# Patient Record
Sex: MALE | Race: WHITE | NOT HISPANIC OR LATINO | Employment: FULL TIME | URBAN - METROPOLITAN AREA
[De-identification: names, ages, dates, MRNs, and addresses within clinical notes are randomized per-mention and may not be internally consistent; named-entity substitution may affect disease eponyms.]

---

## 2017-01-17 ENCOUNTER — OFFICE VISIT (OUTPATIENT)
Dept: ALLERGY | Facility: CLINIC | Age: 42
End: 2017-01-17
Payer: COMMERCIAL

## 2017-01-17 VITALS
BODY MASS INDEX: 30.66 KG/M2 | DIASTOLIC BLOOD PRESSURE: 70 MMHG | HEIGHT: 68 IN | WEIGHT: 202.31 LBS | OXYGEN SATURATION: 97 % | HEART RATE: 65 BPM | SYSTOLIC BLOOD PRESSURE: 122 MMHG

## 2017-01-17 DIAGNOSIS — J30.89 ALLERGIC RHINITIS DUE TO DUST: Primary | ICD-10-CM

## 2017-01-17 PROCEDURE — 99203 OFFICE O/P NEW LOW 30 MIN: CPT | Mod: S$GLB,,, | Performed by: ALLERGY & IMMUNOLOGY

## 2017-01-17 PROCEDURE — 99999 PR PBB SHADOW E&M-EST. PATIENT-LVL III: CPT | Mod: PBBFAC,,, | Performed by: ALLERGY & IMMUNOLOGY

## 2017-01-17 PROCEDURE — 1159F MED LIST DOCD IN RCRD: CPT | Mod: S$GLB,,, | Performed by: ALLERGY & IMMUNOLOGY

## 2017-01-17 NOTE — PROGRESS NOTES
Subjective:       Patient ID: Gautam Ross is a 41 y.o. male.    Chief Complaint:  Allergies (red,itchy watery eyes, sneezing worse in winter)      HPI:  Patient's symptoms include clear rhinorrhea, cough, itchy eyes, itchy nose, nasal congestion, postnasal drip, sinus pressure, sneezing, swelling of eyes and rash around neck. These symptoms are seasonal, episodic. Worse in winter. Sx's mild, absent in summer. Getting out winter sweaters, dust exposure, aggravate sx's. Current triggers include exposure to dust and cats. Takes claritin prior to visiting dad, who has cats. Ok w his dogs. The patient has been suffering from these symptoms for approximately 20 years. Worse in recent years, toribio this year. The patient has tried claritin prn with good relief of symptoms. Takes it about once per month.Takes afrin about once per year x 3-4 days for flares in nasal congestion.  Immunotherapy has never been tried. The patient has never had nasal polyps. The patient has no history of asthma. The patient has no history of eczema. The patient does not suffer from frequent sinopulmonary infections. The patient has not had sinus surgery in the past.     PMHx:  O/w healthy    Family History   Problem Relation Age of Onset    Arthritis Mother     Cancer Father     Diabetes Father     Allergies Son      food allergies   no know parental asthma      Environmental History: Pets in the home: dogs (2).  Martha: hardwood floors  Tobacco Smoke in Home: no     Review of Systems   Constitutional: Negative for activity change, appetite change, fatigue and fever.   HENT: Positive for congestion, postnasal drip, rhinorrhea and sneezing. Negative for ear discharge, facial swelling and sinus pressure.    Eyes: Positive for itching. Negative for redness.   Respiratory: Negative for cough, chest tightness, shortness of breath and wheezing.    Cardiovascular: Negative for chest pain.   Gastrointestinal: Negative for abdominal pain,  constipation, diarrhea, nausea and vomiting.   Genitourinary: Negative for difficulty urinating.   Musculoskeletal: Negative for arthralgias, joint swelling and myalgias.   Skin: Negative for rash.   Neurological: Negative for weakness and headaches.   Hematological: Negative for adenopathy. Does not bruise/bleed easily.   Psychiatric/Behavioral: Negative for behavioral problems and sleep disturbance. The patient is not nervous/anxious.         Objective:    Physical Exam   Constitutional: He is oriented to person, place, and time. He appears well-developed and well-nourished. No distress.   HENT:   Head: Normocephalic and atraumatic.   Right Ear: External ear normal.   Left Ear: External ear normal.   Nose: Nose normal.   Mouth/Throat: Oropharynx is clear and moist. No oropharyngeal exudate.   Eyes: Conjunctivae are normal. Right eye exhibits no discharge. Left eye exhibits no discharge. No scleral icterus.   Neck: Neck supple. No thyromegaly present.   Cardiovascular: Normal rate and regular rhythm.    Pulmonary/Chest: Effort normal and breath sounds normal. No respiratory distress. He has no wheezes.   Abdominal: Soft. He exhibits no distension. There is no tenderness. There is no rebound and no guarding.   Musculoskeletal: Normal range of motion. He exhibits no edema.   Lymphadenopathy:     He has no cervical adenopathy.   Neurological: He is alert and oriented to person, place, and time.   Skin: Skin is warm. No rash noted. He is not diaphoretic. No erythema.   Psychiatric: He has a normal mood and affect. His behavior is normal. Thought content normal.       Laboratory:   none performed   Assessment:       1. Allergic rhinitis due to dust         Plan:       1. claritin prn. Ok routine use if needed.  2. Trial flonase if worsening of sx's  3. Discussed poss allergy testing. Defer for now. Pt may be interested in future dust mite sublingual immunotherapy, in which case we'll reconsider testing. He will check w  / us in a few months if he is interested to check availability  FU prn

## 2017-01-17 NOTE — LETTER
January 17, 2017      Weston Kwong Jr., MD  [address]  [facility] [address]           [facility]  [address] [facility] [address]  Phone: [phone]          Patient: Gautam Ross   MR Number: [id number]   YOB: 1975   Date of Visit: 1/17/2017       Dear Dr. Weston Kwong Jr.:    Thank you for referring Gautam Ross to me for evaluation. Attached you will find relevant portions of my assessment and plan of care.    If you have questions, please do not hesitate to call me. I look forward to following Gautam Ross along with you.    Sincerely,    Cooper Chavira MD    Enclosure  CC:  No Recipients    If you would like to receive this communication electronically, please contact [email or web address] or [phone] to request more information on [employer] Link access.    For providers and/or their staff who would like to refer a patient to [facility], please contact us through our one-stop-shop provider referral line, [facility], at [phone].    If you feel you have received this communication in error or would no longer like to receive these types of communications, please e-mail [email or web address]

## 2017-04-17 ENCOUNTER — OFFICE VISIT (OUTPATIENT)
Dept: INTERNAL MEDICINE | Facility: CLINIC | Age: 42
End: 2017-04-17
Payer: COMMERCIAL

## 2017-04-17 VITALS
HEART RATE: 80 BPM | SYSTOLIC BLOOD PRESSURE: 130 MMHG | DIASTOLIC BLOOD PRESSURE: 85 MMHG | BODY MASS INDEX: 30.94 KG/M2 | HEIGHT: 68 IN | WEIGHT: 204.13 LBS

## 2017-04-17 DIAGNOSIS — E78.5 HYPERLIPIDEMIA, UNSPECIFIED HYPERLIPIDEMIA TYPE: Primary | ICD-10-CM

## 2017-04-17 DIAGNOSIS — B02.9 HERPES ZOSTER WITHOUT COMPLICATION: ICD-10-CM

## 2017-04-17 PROCEDURE — 99999 PR PBB SHADOW E&M-EST. PATIENT-LVL III: CPT | Mod: PBBFAC,,, | Performed by: INTERNAL MEDICINE

## 2017-04-17 PROCEDURE — 1160F RVW MEDS BY RX/DR IN RCRD: CPT | Mod: S$GLB,,, | Performed by: INTERNAL MEDICINE

## 2017-04-17 PROCEDURE — 99213 OFFICE O/P EST LOW 20 MIN: CPT | Mod: S$GLB,,, | Performed by: INTERNAL MEDICINE

## 2017-04-17 RX ORDER — VALACYCLOVIR HYDROCHLORIDE 1 G/1
1000 TABLET, FILM COATED ORAL 3 TIMES DAILY
Qty: 21 TABLET | Refills: 0 | Status: SHIPPED | OUTPATIENT
Start: 2017-04-17 | End: 2018-06-07

## 2017-04-17 RX ORDER — FLUTICASONE PROPIONATE 50 MCG
1 SPRAY, SUSPENSION (ML) NASAL DAILY
COMMUNITY
End: 2023-07-31 | Stop reason: CLARIF

## 2017-04-17 NOTE — MR AVS SNAPSHOT
[facility]  [address]  [place] [address]  Phone: [phone]  Fax: [phone]                  Gautam Ross   2017 8:40 AM   Office Visit    Description:  Male : 1975   Provider:  Weston Kwong Jr., MD   Department:  [facility]           Reason for Visit     Rash           Diagnoses this Visit        Comments    Hyperlipidemia, unspecified hyperlipidemia type    -  Primary            To Do List           Goals (5 Years of Data)     None      Follow-Up and Disposition     Return in about 1 year (around 2018).       These Medications        Disp Refills Start End    valacyclovir (VALTREX) 1000 MG tablet 21 tablet 0 2017    Take 1 tablet (1,000 mg total) by mouth 3 (three) times daily. - Oral    Pharmacy: [facility]/pharmacy #[id number] - [place] - [address]  #: [phone]         [facility]     [facility] Nurse Care Line -  Assistance  Unless otherwise directed by your provider, please contact [facility], our nurse care line that is available for  assistance.     Registered nurses in the [facility] On Call Center provide: appointment scheduling, clinical advisement, health education, and other advisory services.  Call: [phone] (toll free)               Medications           Message regarding Medications     Verify the changes and/or additions to your medication regime listed below are the same as discussed with your clinician today.  If any of these changes or additions are incorrect, please notify your healthcare provider.        START taking these NEW medications        Refills    valacyclovir (VALTREX) 1000 MG tablet 0    Sig: Take 1 tablet (1,000 mg total) by mouth 3 (three) times daily.    Class: Normal    Route: Oral           Verify that the below list of medications is an accurate representation of the medications you are currently taking.  If none reported, the  "list may be blank. If incorrect, please contact your healthcare provider. Carry this list with you in case of emergency.           Current Medications     fluticasone (FLONASE) 50 mcg/actuation nasal spray 1 spray by Each Nare route once daily. Each nostril daily    valacyclovir (VALTREX) 1000 MG tablet Take 1 tablet (1,000 mg total) by mouth 3 (three) times daily.           Clinical Reference Information           Your Vitals Were     BP Pulse Height Weight BMI    130/85 80 5' 8" (1.727 m) 92.6 kg (204 lb 2.3 oz) 31.04 kg/m2      Blood Pressure          Most Recent Value    BP  130/85      Allergies as of 4/17/2017     No Known Allergies      Immunizations Administered on Date of Encounter - 4/17/2017     None      Orders Placed During Today's Visit     Future Labs/Procedures Expected by Expires    Comprehensive metabolic panel  4/17/2017 4/17/2018    Lipid panel  4/17/2017 4/17/2018      Language Assistance Services     ATTENTION: Language assistance services are available, free of charge. Please call [phone].      ATENCIÓN: Si habla toribioañol, tiene a williamson disposición servicios gratuitos de asistencia lingüística. Llame al [phone].     ROBERTO Ý: N?u b?n nói Ti?ng Vi?t, có các d?ch v? h? tr? ngôn ng? mi?n phí dành cho b?n. G?i s? [phone].         [facility] complies with applicable Federal civil rights laws and does not discriminate on the basis of race, color, national origin, age, disability, or sex.        "

## 2017-04-17 NOTE — PROGRESS NOTES
Subjective:       Patient ID: Gautam Ross is a 42 y.o. male.    Chief Complaint: Rash (x week, back and rib, right side, itchy, painful )    HPI the patient is a 42-year-old male comes in for evaluation of a rash in for checkup.  The patient reports that beginning pain and his right flank area about 4-5 days ago.  About 2 days after the onset of the pain, he began noticing a slight rash.  The pain is located in a strep from the mid lower right back going into the right groin area.  He has not had any fever.  Review of Systems   Constitutional: Negative for appetite change, fatigue and unexpected weight change.   HENT: Negative for congestion and sore throat.    Eyes: Negative for visual disturbance.   Respiratory: Negative for cough, chest tightness, shortness of breath and wheezing.    Cardiovascular: Negative for chest pain and palpitations.   Gastrointestinal: Negative for abdominal distention, abdominal pain, blood in stool, constipation, diarrhea and nausea.   Genitourinary: Negative for difficulty urinating, dysuria, frequency and urgency.   Musculoskeletal: Negative for arthralgias, myalgias and neck stiffness.   Skin: Positive for rash.   Neurological: Negative for dizziness, weakness and headaches.   Psychiatric/Behavioral: Negative for behavioral problems, hallucinations and self-injury.       Objective:      Physical Exam   Constitutional: He is oriented to person, place, and time. He appears well-developed. No distress.   HENT:   Head: Normocephalic.   Mouth/Throat: No oropharyngeal exudate.   Eyes: Conjunctivae and EOM are normal. Pupils are equal, round, and reactive to light. Right eye exhibits no discharge. Left eye exhibits no discharge. No scleral icterus.   Fundi benign bilaterally.   Neck: Normal range of motion. No JVD present. No tracheal deviation present. No thyromegaly present.   Cardiovascular: Normal rate, regular rhythm and normal heart sounds.  Exam reveals no gallop and no  friction rub.    No murmur heard.  Pulmonary/Chest: Effort normal and breath sounds normal. No respiratory distress. He has no wheezes. He has no rales. He exhibits no tenderness.   Abdominal: Soft. Bowel sounds are normal. He exhibits no distension and no mass. There is no tenderness. There is no rebound and no guarding.   Musculoskeletal: Normal range of motion. He exhibits no edema or tenderness.   Lymphadenopathy:     He has no cervical adenopathy.   Neurological: He is alert and oriented to person, place, and time. He has normal reflexes. He displays normal reflexes. No cranial nerve deficit. He exhibits normal muscle tone. Coordination normal.   Skin: Skin is warm and dry. Rash noted. He is not diaphoretic. No erythema. No pallor.   Several splotches of rash with early vesicles are present in the right T12 dermatome beginning in the back and also occurring in the right inguinal area.   Psychiatric: He has a normal mood and affect. His behavior is normal. Thought content normal.       Assessment:       1. Hyperlipidemia, unspecified hyperlipidemia type    2. Herpes zoster without complication        Plan:       1.  Valtrex 1 g by mouth 3 times a day ×1 week  2.  Fasting lipids, CMP  3.  Return to clinic in 1 year

## 2017-05-31 ENCOUNTER — OFFICE VISIT (OUTPATIENT)
Dept: OPTOMETRY | Facility: CLINIC | Age: 42
End: 2017-05-31
Payer: COMMERCIAL

## 2017-05-31 DIAGNOSIS — Z98.890 HX OF LASIK: ICD-10-CM

## 2017-05-31 DIAGNOSIS — H52.4 MYOPIA OF BOTH EYES WITH ASTIGMATISM AND PRESBYOPIA: Primary | ICD-10-CM

## 2017-05-31 DIAGNOSIS — H52.13 MYOPIA OF BOTH EYES WITH ASTIGMATISM AND PRESBYOPIA: Primary | ICD-10-CM

## 2017-05-31 DIAGNOSIS — H52.203 MYOPIA OF BOTH EYES WITH ASTIGMATISM AND PRESBYOPIA: Primary | ICD-10-CM

## 2017-05-31 DIAGNOSIS — Z46.0 FITTING AND ADJUSTMENT OF SPECTACLES AND CONTACT LENSES: Primary | ICD-10-CM

## 2017-05-31 PROCEDURE — 99999 PR PBB SHADOW E&M-EST. PATIENT-LVL II: CPT | Mod: PBBFAC,,, | Performed by: OPTOMETRIST

## 2017-05-31 PROCEDURE — 92310 CONTACT LENS FITTING OU: CPT | Mod: ,,, | Performed by: OPTOMETRIST

## 2017-05-31 PROCEDURE — 92004 COMPRE OPH EXAM NEW PT 1/>: CPT | Mod: S$GLB,,, | Performed by: OPTOMETRIST

## 2017-05-31 PROCEDURE — 92015 DETERMINE REFRACTIVE STATE: CPT | Mod: S$GLB,,, | Performed by: OPTOMETRIST

## 2017-05-31 NOTE — PROGRESS NOTES
HPI     MEME: 2 years ago  Pt states  va with CL is not great. States this is his first time wearing   CL corrected for astigmatism. States he feels as though he is always   squinting. Denies f/f  +Eye allergies     No gtts     S/p LASIK ou (15 years ago ?)    Last edited by Sydney Florez on 5/31/2017  8:11 AM. (History)            Assessment /Plan     For exam results, see Encounter Report.    Myopia of both eyes with astigmatism and presbyopia    Hx of LASIK      1. Residual myopia/astig/presby sp LASIK 15 years ago.  Pt wearing ONE DAY AV MOIST ASTIG, but needs inc cyl power OD>OS.  Discussed presby, monovision options, OR keeping in present CLs w reduced minus to help at near.  Pt wishes full distance correction, and will wear otc readers.  Also discussed scleral lens/RGP options for best vision, but pt wishes to stay in SCLs    PLAN:    1. Sent pt to optical to order TRIAL CLs in [place] he needs.  I do NOT need to see at disp  2. Cont DW sched--exchange nightly  3. If no problems will call for CLRx, rtc 1 yr

## 2018-05-13 ENCOUNTER — ANESTHESIA EVENT (OUTPATIENT)
Dept: SURGERY | Facility: HOSPITAL | Age: 43
End: 2018-05-13
Payer: COMMERCIAL

## 2018-05-13 ENCOUNTER — SURGERY (OUTPATIENT)
Age: 43
End: 2018-05-13

## 2018-05-13 ENCOUNTER — HOSPITAL ENCOUNTER (OUTPATIENT)
Facility: HOSPITAL | Age: 43
Discharge: HOME | End: 2018-05-13
Attending: EMERGENCY MEDICINE | Admitting: SURGERY
Payer: COMMERCIAL

## 2018-05-13 ENCOUNTER — ANESTHESIA (OUTPATIENT)
Dept: SURGERY | Facility: HOSPITAL | Age: 43
End: 2018-05-13
Payer: COMMERCIAL

## 2018-05-13 VITALS
SYSTOLIC BLOOD PRESSURE: 114 MMHG | WEIGHT: 187 LBS | RESPIRATION RATE: 16 BRPM | BODY MASS INDEX: 28.34 KG/M2 | HEIGHT: 68 IN | DIASTOLIC BLOOD PRESSURE: 66 MMHG | TEMPERATURE: 98 F | OXYGEN SATURATION: 93 % | HEART RATE: 55 BPM

## 2018-05-13 DIAGNOSIS — K35.80 ACUTE APPENDICITIS, UNSPECIFIED ACUTE APPENDICITIS TYPE: Primary | ICD-10-CM

## 2018-05-13 LAB
ALBUMIN SERPL BCP-MCNC: 4.1 G/DL
ALP SERPL-CCNC: 109 U/L
ALT SERPL W/O P-5'-P-CCNC: 28 U/L
ANION GAP SERPL CALC-SCNC: 7 MMOL/L
AST SERPL-CCNC: 19 U/L
BASOPHILS # BLD AUTO: 0.04 K/UL
BASOPHILS NFR BLD: 0.5 %
BILIRUB SERPL-MCNC: 0.7 MG/DL
BILIRUB UR QL STRIP: NEGATIVE
BUN SERPL-MCNC: 18 MG/DL
BUN SERPL-MCNC: 19 MG/DL (ref 6–30)
CALCIUM SERPL-MCNC: 9.3 MG/DL
CHLORIDE SERPL-SCNC: 102 MMOL/L (ref 95–110)
CHLORIDE SERPL-SCNC: 104 MMOL/L
CLARITY UR REFRACT.AUTO: ABNORMAL
CO2 SERPL-SCNC: 27 MMOL/L
COLOR UR AUTO: YELLOW
CREAT SERPL-MCNC: 1 MG/DL
CREAT SERPL-MCNC: 1 MG/DL (ref 0.5–1.4)
DIFFERENTIAL METHOD: ABNORMAL
EOSINOPHIL # BLD AUTO: 0.2 K/UL
EOSINOPHIL NFR BLD: 2 %
ERYTHROCYTE [DISTWIDTH] IN BLOOD BY AUTOMATED COUNT: 13.3 %
EST. GFR  (AFRICAN AMERICAN): >60 ML/MIN/1.73 M^2
EST. GFR  (NON AFRICAN AMERICAN): >60 ML/MIN/1.73 M^2
GLUCOSE SERPL-MCNC: 120 MG/DL (ref 70–110)
GLUCOSE SERPL-MCNC: 121 MG/DL
GLUCOSE UR QL STRIP: NEGATIVE
HCT VFR BLD AUTO: 46.3 %
HCT VFR BLD CALC: 45 %PCV (ref 36–54)
HGB BLD-MCNC: 15 G/DL
HGB UR QL STRIP: NEGATIVE
IMM GRANULOCYTES # BLD AUTO: 0.03 K/UL
IMM GRANULOCYTES NFR BLD AUTO: 0.4 %
KETONES UR QL STRIP: NEGATIVE
LEUKOCYTE ESTERASE UR QL STRIP: NEGATIVE
LIPASE SERPL-CCNC: 25 U/L
LYMPHOCYTES # BLD AUTO: 1.2 K/UL
LYMPHOCYTES NFR BLD: 14 %
MCH RBC QN AUTO: 26.9 PG
MCHC RBC AUTO-ENTMCNC: 32.4 G/DL
MCV RBC AUTO: 83 FL
MONOCYTES # BLD AUTO: 0.5 K/UL
MONOCYTES NFR BLD: 5.7 %
NEUTROPHILS # BLD AUTO: 6.6 K/UL
NEUTROPHILS NFR BLD: 77.4 %
NITRITE UR QL STRIP: NEGATIVE
NRBC BLD-RTO: 0 /100 WBC
PH UR STRIP: 5 [PH] (ref 5–8)
PLATELET # BLD AUTO: 215 K/UL
PMV BLD AUTO: 11.1 FL
POC IONIZED CALCIUM: 1.15 MMOL/L (ref 1.06–1.42)
POC TCO2 (MEASURED): 29 MMOL/L (ref 23–29)
POTASSIUM BLD-SCNC: 4.3 MMOL/L (ref 3.5–5.1)
POTASSIUM SERPL-SCNC: 4.3 MMOL/L
PROT SERPL-MCNC: 7.9 G/DL
PROT UR QL STRIP: NEGATIVE
RBC # BLD AUTO: 5.58 M/UL
SAMPLE: ABNORMAL
SODIUM BLD-SCNC: 140 MMOL/L (ref 136–145)
SODIUM SERPL-SCNC: 138 MMOL/L
SP GR UR STRIP: 1.02 (ref 1–1.03)
URN SPEC COLLECT METH UR: ABNORMAL
UROBILINOGEN UR STRIP-ACNC: NEGATIVE EU/DL
WBC # BLD AUTO: 8.49 K/UL

## 2018-05-13 PROCEDURE — 25000003 PHARM REV CODE 250: Performed by: EMERGENCY MEDICINE

## 2018-05-13 PROCEDURE — 71000033 HC RECOVERY, INTIAL HOUR: Performed by: SURGERY

## 2018-05-13 PROCEDURE — G0378 HOSPITAL OBSERVATION PER HR: HCPCS

## 2018-05-13 PROCEDURE — 83690 ASSAY OF LIPASE: CPT

## 2018-05-13 PROCEDURE — 80053 COMPREHEN METABOLIC PANEL: CPT

## 2018-05-13 PROCEDURE — 36000709 HC OR TIME LEV III EA ADD 15 MIN: Performed by: SURGERY

## 2018-05-13 PROCEDURE — 96368 THER/DIAG CONCURRENT INF: CPT

## 2018-05-13 PROCEDURE — 96366 THER/PROPH/DIAG IV INF ADDON: CPT

## 2018-05-13 PROCEDURE — 63600175 PHARM REV CODE 636 W HCPCS: Performed by: NURSE ANESTHETIST, CERTIFIED REGISTERED

## 2018-05-13 PROCEDURE — S0030 INJECTION, METRONIDAZOLE: HCPCS | Performed by: EMERGENCY MEDICINE

## 2018-05-13 PROCEDURE — 99285 EMERGENCY DEPT VISIT HI MDM: CPT | Mod: ,,, | Performed by: EMERGENCY MEDICINE

## 2018-05-13 PROCEDURE — 88304 TISSUE EXAM BY PATHOLOGIST: CPT | Performed by: PATHOLOGY

## 2018-05-13 PROCEDURE — 37000008 HC ANESTHESIA 1ST 15 MINUTES: Performed by: SURGERY

## 2018-05-13 PROCEDURE — S0020 INJECTION, BUPIVICAINE HYDRO: HCPCS | Performed by: SURGERY

## 2018-05-13 PROCEDURE — 99283 EMERGENCY DEPT VISIT LOW MDM: CPT | Mod: 57,,, | Performed by: SURGERY

## 2018-05-13 PROCEDURE — 27201423 OPTIME MED/SURG SUP & DEVICES STERILE SUPPLY: Performed by: SURGERY

## 2018-05-13 PROCEDURE — 25000003 PHARM REV CODE 250: Performed by: SURGERY

## 2018-05-13 PROCEDURE — 44970 LAPAROSCOPY APPENDECTOMY: CPT | Mod: ,,, | Performed by: SURGERY

## 2018-05-13 PROCEDURE — 25500020 PHARM REV CODE 255: Performed by: EMERGENCY MEDICINE

## 2018-05-13 PROCEDURE — 96375 TX/PRO/DX INJ NEW DRUG ADDON: CPT

## 2018-05-13 PROCEDURE — 37000009 HC ANESTHESIA EA ADD 15 MINS: Performed by: SURGERY

## 2018-05-13 PROCEDURE — 36000708 HC OR TIME LEV III 1ST 15 MIN: Performed by: SURGERY

## 2018-05-13 PROCEDURE — 88304 TISSUE EXAM BY PATHOLOGIST: CPT | Mod: 26,,, | Performed by: PATHOLOGY

## 2018-05-13 PROCEDURE — 85025 COMPLETE CBC W/AUTO DIFF WBC: CPT

## 2018-05-13 PROCEDURE — 81003 URINALYSIS AUTO W/O SCOPE: CPT

## 2018-05-13 PROCEDURE — 96365 THER/PROPH/DIAG IV INF INIT: CPT

## 2018-05-13 PROCEDURE — D9220A PRA ANESTHESIA: Mod: ANES,,, | Performed by: ANESTHESIOLOGY

## 2018-05-13 PROCEDURE — 25000003 PHARM REV CODE 250: Performed by: NURSE ANESTHETIST, CERTIFIED REGISTERED

## 2018-05-13 PROCEDURE — 63600175 PHARM REV CODE 636 W HCPCS: Performed by: SURGERY

## 2018-05-13 PROCEDURE — D9220A PRA ANESTHESIA: Mod: CRNA,,, | Performed by: NURSE ANESTHETIST, CERTIFIED REGISTERED

## 2018-05-13 PROCEDURE — 99285 EMERGENCY DEPT VISIT HI MDM: CPT | Mod: 25

## 2018-05-13 PROCEDURE — 63600175 PHARM REV CODE 636 W HCPCS: Performed by: EMERGENCY MEDICINE

## 2018-05-13 RX ORDER — OXYCODONE HYDROCHLORIDE 5 MG/1
10 TABLET ORAL ONCE
Status: COMPLETED | OUTPATIENT
Start: 2018-05-13 | End: 2018-05-13

## 2018-05-13 RX ORDER — ONDANSETRON 2 MG/ML
INJECTION INTRAMUSCULAR; INTRAVENOUS
Status: DISCONTINUED | OUTPATIENT
Start: 2018-05-13 | End: 2018-05-13

## 2018-05-13 RX ORDER — OXYCODONE AND ACETAMINOPHEN 5; 325 MG/1; MG/1
1-2 TABLET ORAL
Refills: 0
Start: 2018-05-13 | End: 2018-05-13

## 2018-05-13 RX ORDER — FLUTICASONE PROPIONATE 50 MCG
1 SPRAY, SUSPENSION (ML) NASAL DAILY
Status: DISCONTINUED | OUTPATIENT
Start: 2018-05-14 | End: 2018-05-13 | Stop reason: HOSPADM

## 2018-05-13 RX ORDER — OXYCODONE AND ACETAMINOPHEN 5; 325 MG/1; MG/1
1 TABLET ORAL EVERY 4 HOURS PRN
Status: DISCONTINUED | OUTPATIENT
Start: 2018-05-13 | End: 2018-05-13 | Stop reason: HOSPADM

## 2018-05-13 RX ORDER — BUPIVACAINE HYDROCHLORIDE 5 MG/ML
INJECTION, SOLUTION EPIDURAL; INTRACAUDAL
Status: DISCONTINUED | OUTPATIENT
Start: 2018-05-13 | End: 2018-05-13 | Stop reason: HOSPADM

## 2018-05-13 RX ORDER — HYDROMORPHONE HYDROCHLORIDE 1 MG/ML
0.5 INJECTION, SOLUTION INTRAMUSCULAR; INTRAVENOUS; SUBCUTANEOUS
Status: DISCONTINUED | OUTPATIENT
Start: 2018-05-13 | End: 2018-05-13

## 2018-05-13 RX ORDER — VALACYCLOVIR HYDROCHLORIDE 500 MG/1
1000 TABLET, FILM COATED ORAL 3 TIMES DAILY
Status: DISCONTINUED | OUTPATIENT
Start: 2018-05-13 | End: 2018-05-13 | Stop reason: HOSPADM

## 2018-05-13 RX ORDER — OXYCODONE AND ACETAMINOPHEN 5; 325 MG/1; MG/1
1-2 TABLET ORAL
Qty: 45 TABLET | Refills: 0 | Status: SHIPPED | OUTPATIENT
Start: 2018-05-13 | End: 2018-06-07

## 2018-05-13 RX ORDER — MIDAZOLAM HYDROCHLORIDE 1 MG/ML
INJECTION, SOLUTION INTRAMUSCULAR; INTRAVENOUS
Status: DISCONTINUED | OUTPATIENT
Start: 2018-05-13 | End: 2018-05-13

## 2018-05-13 RX ORDER — ONDANSETRON 8 MG/1
8 TABLET, ORALLY DISINTEGRATING ORAL EVERY 6 HOURS PRN
Status: DISCONTINUED | OUTPATIENT
Start: 2018-05-13 | End: 2018-05-13 | Stop reason: HOSPADM

## 2018-05-13 RX ORDER — LIDOCAINE HCL/PF 100 MG/5ML
SYRINGE (ML) INTRAVENOUS
Status: DISCONTINUED | OUTPATIENT
Start: 2018-05-13 | End: 2018-05-13

## 2018-05-13 RX ORDER — ACETAMINOPHEN 10 MG/ML
INJECTION, SOLUTION INTRAVENOUS
Status: DISCONTINUED | OUTPATIENT
Start: 2018-05-13 | End: 2018-05-13

## 2018-05-13 RX ORDER — HYDROMORPHONE HYDROCHLORIDE 1 MG/ML
0.5 INJECTION, SOLUTION INTRAMUSCULAR; INTRAVENOUS; SUBCUTANEOUS
Status: DISCONTINUED | OUTPATIENT
Start: 2018-05-13 | End: 2018-05-13 | Stop reason: HOSPADM

## 2018-05-13 RX ORDER — METRONIDAZOLE 500 MG/100ML
500 INJECTION, SOLUTION INTRAVENOUS
Status: COMPLETED | OUTPATIENT
Start: 2018-05-13 | End: 2018-05-13

## 2018-05-13 RX ORDER — GLYCOPYRROLATE 0.2 MG/ML
INJECTION INTRAMUSCULAR; INTRAVENOUS
Status: DISCONTINUED | OUTPATIENT
Start: 2018-05-13 | End: 2018-05-13

## 2018-05-13 RX ORDER — ONDANSETRON 4 MG/1
4 TABLET, ORALLY DISINTEGRATING ORAL EVERY 6 HOURS PRN
Status: DISCONTINUED | OUTPATIENT
Start: 2018-05-13 | End: 2018-05-13

## 2018-05-13 RX ORDER — SODIUM CHLORIDE 0.9 % (FLUSH) 0.9 %
3 SYRINGE (ML) INJECTION EVERY 8 HOURS
Status: DISCONTINUED | OUTPATIENT
Start: 2018-05-13 | End: 2018-05-13 | Stop reason: HOSPADM

## 2018-05-13 RX ORDER — OXYCODONE AND ACETAMINOPHEN 5; 325 MG/1; MG/1
2 TABLET ORAL EVERY 4 HOURS PRN
Status: DISCONTINUED | OUTPATIENT
Start: 2018-05-13 | End: 2018-05-13 | Stop reason: HOSPADM

## 2018-05-13 RX ORDER — FENTANYL CITRATE 50 UG/ML
INJECTION, SOLUTION INTRAMUSCULAR; INTRAVENOUS
Status: DISCONTINUED | OUTPATIENT
Start: 2018-05-13 | End: 2018-05-13

## 2018-05-13 RX ORDER — SUCCINYLCHOLINE CHLORIDE 20 MG/ML
INJECTION INTRAMUSCULAR; INTRAVENOUS
Status: DISCONTINUED | OUTPATIENT
Start: 2018-05-13 | End: 2018-05-13

## 2018-05-13 RX ORDER — ROCURONIUM BROMIDE 10 MG/ML
INJECTION, SOLUTION INTRAVENOUS
Status: DISCONTINUED | OUTPATIENT
Start: 2018-05-13 | End: 2018-05-13

## 2018-05-13 RX ORDER — DEXTROSE MONOHYDRATE, SODIUM CHLORIDE, AND POTASSIUM CHLORIDE 50; 1.49; 9 G/1000ML; G/1000ML; G/1000ML
INJECTION, SOLUTION INTRAVENOUS CONTINUOUS
Status: DISCONTINUED | OUTPATIENT
Start: 2018-05-13 | End: 2018-05-13

## 2018-05-13 RX ORDER — PROCHLORPERAZINE EDISYLATE 5 MG/ML
10 INJECTION INTRAMUSCULAR; INTRAVENOUS EVERY 6 HOURS PRN
Status: DISCONTINUED | OUTPATIENT
Start: 2018-05-13 | End: 2018-05-13 | Stop reason: HOSPADM

## 2018-05-13 RX ORDER — NEOSTIGMINE METHYLSULFATE 1 MG/ML
INJECTION, SOLUTION INTRAVENOUS
Status: DISCONTINUED | OUTPATIENT
Start: 2018-05-13 | End: 2018-05-13

## 2018-05-13 RX ORDER — DEXAMETHASONE SODIUM PHOSPHATE 4 MG/ML
INJECTION, SOLUTION INTRA-ARTICULAR; INTRALESIONAL; INTRAMUSCULAR; INTRAVENOUS; SOFT TISSUE
Status: DISCONTINUED | OUTPATIENT
Start: 2018-05-13 | End: 2018-05-13

## 2018-05-13 RX ORDER — PROPOFOL 10 MG/ML
VIAL (ML) INTRAVENOUS
Status: DISCONTINUED | OUTPATIENT
Start: 2018-05-13 | End: 2018-05-13

## 2018-05-13 RX ADMIN — ACETAMINOPHEN 1000 MG: 10 INJECTION, SOLUTION INTRAVENOUS at 01:05

## 2018-05-13 RX ADMIN — SUCCINYLCHOLINE CHLORIDE 120 MG: 20 INJECTION, SOLUTION INTRAMUSCULAR; INTRAVENOUS at 01:05

## 2018-05-13 RX ADMIN — ONDANSETRON 4 MG: 4 TABLET, ORALLY DISINTEGRATING ORAL at 11:05

## 2018-05-13 RX ADMIN — GLYCOPYRROLATE 0.6 MG: 0.2 INJECTION, SOLUTION INTRAMUSCULAR; INTRAVENOUS at 01:05

## 2018-05-13 RX ADMIN — NEOSTIGMINE METHYLSULFATE 5 MG: 1 INJECTION INTRAVENOUS at 01:05

## 2018-05-13 RX ADMIN — CEFTRIAXONE SODIUM 2 G: 2 INJECTION, POWDER, FOR SOLUTION INTRAMUSCULAR; INTRAVENOUS at 10:05

## 2018-05-13 RX ADMIN — SODIUM CHLORIDE, SODIUM GLUCONATE, SODIUM ACETATE, POTASSIUM CHLORIDE, MAGNESIUM CHLORIDE, SODIUM PHOSPHATE, DIBASIC, AND POTASSIUM PHOSPHATE: .53; .5; .37; .037; .03; .012; .00082 INJECTION, SOLUTION INTRAVENOUS at 01:05

## 2018-05-13 RX ADMIN — OXYCODONE HYDROCHLORIDE 10 MG: 5 TABLET ORAL at 02:05

## 2018-05-13 RX ADMIN — METRONIDAZOLE 500 MG: 500 INJECTION, SOLUTION INTRAVENOUS at 10:05

## 2018-05-13 RX ADMIN — DEXTROSE MONOHYDRATE, SODIUM CHLORIDE, AND POTASSIUM CHLORIDE: 50; 9; 1.49 INJECTION, SOLUTION INTRAVENOUS at 12:05

## 2018-05-13 RX ADMIN — ROCURONIUM BROMIDE 10 MG: 10 INJECTION, SOLUTION INTRAVENOUS at 01:05

## 2018-05-13 RX ADMIN — FENTANYL CITRATE 100 MCG: 50 INJECTION, SOLUTION INTRAMUSCULAR; INTRAVENOUS at 01:05

## 2018-05-13 RX ADMIN — ONDANSETRON 4 MG: 2 INJECTION INTRAMUSCULAR; INTRAVENOUS at 01:05

## 2018-05-13 RX ADMIN — BUPIVACAINE HYDROCHLORIDE 10 ML: 5 INJECTION, SOLUTION EPIDURAL; INTRACAUDAL; PERINEURAL at 02:05

## 2018-05-13 RX ADMIN — LIDOCAINE HYDROCHLORIDE 100 MG: 20 INJECTION, SOLUTION INTRAVENOUS at 01:05

## 2018-05-13 RX ADMIN — ROCURONIUM BROMIDE 30 MG: 10 INJECTION, SOLUTION INTRAVENOUS at 01:05

## 2018-05-13 RX ADMIN — MIDAZOLAM HYDROCHLORIDE 2 MG: 1 INJECTION, SOLUTION INTRAMUSCULAR; INTRAVENOUS at 01:05

## 2018-05-13 RX ADMIN — IOHEXOL 75 ML: 350 INJECTION, SOLUTION INTRAVENOUS at 09:05

## 2018-05-13 RX ADMIN — PROPOFOL 150 MG: 10 INJECTION, EMULSION INTRAVENOUS at 01:05

## 2018-05-13 RX ADMIN — DEXAMETHASONE SODIUM PHOSPHATE 8 MG: 4 INJECTION, SOLUTION INTRAMUSCULAR; INTRAVENOUS at 01:05

## 2018-05-13 RX ADMIN — OXYCODONE HYDROCHLORIDE AND ACETAMINOPHEN 2 TABLET: 5; 325 TABLET ORAL at 07:05

## 2018-05-13 RX ADMIN — HYDROMORPHONE HYDROCHLORIDE 0.5 MG: 1 INJECTION, SOLUTION INTRAMUSCULAR; INTRAVENOUS; SUBCUTANEOUS at 12:05

## 2018-05-13 NOTE — ANESTHESIA POSTPROCEDURE EVALUATION
"Anesthesia Post Evaluation    Patient: Gautam Ross    Procedure(s) Performed: Procedure(s) (LRB):  APPENDECTOMY-LAPAROSCOPIC (N/A)    Final Anesthesia Type: general  Patient location during evaluation: PACU  Patient participation: Yes- Able to Participate  Level of consciousness: awake and alert  Post-procedure vital signs: reviewed and stable  Pain management: adequate  Airway patency: patent  PONV status at discharge: No PONV  Anesthetic complications: no      Cardiovascular status: blood pressure returned to baseline  Respiratory status: unassisted  Hydration status: euvolemic  Follow-up not needed.        Visit Vitals  /63 (BP Location: Left arm, Patient Position: Lying)   Pulse (!) 47   Temp 36.7 °C (98.1 °F) (Temporal)   Resp 18   Ht 5' 8" (1.727 m)   Wt 84.8 kg (187 lb)   SpO2 100%   BMI 28.43 kg/m²       Pain/Marily Score: Pain Rating Prior to Med Admin: 5 (5/13/2018 12:03 PM)      "

## 2018-05-13 NOTE — BRIEF OP NOTE
[facility]  Brief Operative Note     SUMMARY     Surgery Date: 5/13/2018     Surgeon(s) and Role:     * DEBORAH Sheffield Jr., MD - Resident - Assisting     * Arsalan Carmona MD - Primary    Pre-op Diagnosis:  Acute appendicitis, unspecified acute appendicitis type [K35.80]    Post-op Diagnosis:  Post-Op Diagnosis Codes:     * Acute appendicitis, unspecified acute appendicitis type [K35.80]    Procedure(s) (LRB):  APPENDECTOMY-LAPAROSCOPIC (N/A)    Anesthesia: General    Description of the findings of the procedure: Acute appendicitis, no evidence of perforation.  Removed laparoscopically.      Estimated Blood Loss: 10 cc.         Specimens:   Specimen (12h ago through future)    Start     Ordered    05/13/18 1356  Specimen to Pathology - Surgery  Once     Comments:  1. Appendix for permanent      05/13/18 1356          Discharge Note    SUMMARY     Admit Date: 5/13/2018    Discharge Date and Time:  05/13/2018 2:14 PM    Hospital Course (synopsis of major diagnoses, care, treatment, and services provided during the course of the hospital stay): Uneventful outpatient surgery.  Patient was discharged in good condition on evening of POD 0.    Final Diagnosis: Post-Op Diagnosis Codes:     * Acute appendicitis, unspecified acute appendicitis type [K35.80]    Disposition: Home or Self Care    Follow Up/Patient Instructions:     Medications:  Reconciled Home Medications:      Medication List      START taking these medications    oxyCODONE-acetaminophen 5-325 mg per tablet  Commonly known as:  PERCOCET  Take 1-2 tablets by mouth every 4 to 6 hours as needed.        CONTINUE taking these medications    fluticasone 50 mcg/actuation nasal spray  Commonly known as:  FLONASE  1 spray by Each Nare route once daily. Each nostril daily     valACYclovir 1000 MG tablet  Commonly known as:  VALTREX  Take 1 tablet (1,000 mg total) by mouth 3 (three) times daily.            Discharge Procedure Orders  Activity as  tolerated   Order Comments: Do not wet incision for 48 hours after surgery.  At that time, wash gently with warm soap and water at least once a day.  Do not scrub hard.  Do not soak incision in water for 2 weeks. Steri strips (small white pieces of surgical tape) will fall off on their own (10-14 days).  May place bandaids or gauze and tape over steri strips if there is some oozing.     Lifting restrictions   Order Comments: Do not lift anything greater than 10-15 lbs for 6 weeks     Notify your health care provider if you experience any of the following:  increased confusion or weakness     Notify your health care provider if you experience any of the following:  persistent dizziness, light-headedness, or visual disturbances     Notify your health care provider if you experience any of the following:  worsening rash     Notify your health care provider if you experience any of the following:  severe persistent headache     Notify your health care provider if you experience any of the following:  difficulty breathing or increased cough     Notify your health care provider if you experience any of the following:  redness, tenderness, or signs of infection (pain, swelling, redness, odor or green/yellow discharge around incision site)     Notify your health care provider if you experience any of the following:  severe uncontrolled pain     Notify your health care provider if you experience any of the following:  persistent nausea and vomiting or diarrhea     Notify your health care provider if you experience any of the following:  temperature >100.4     No dressing needed       Follow-up Information     Arsalan Carmona MD In 2 weeks.    Specialty:  General Surgery  Why:  For wound re-check  Contact information:  [address]  [facility] [address]  [phone]

## 2018-05-13 NOTE — TRANSFER OF CARE
"Anesthesia Transfer of Care Note    Patient: Gautam Ross    Procedure(s) Performed: Procedure(s) (LRB):  APPENDECTOMY-LAPAROSCOPIC (N/A)    Patient location: PACU    Anesthesia Type: general    Transport from OR: Transported from OR on 6-10 L/min O2 by face mask with adequate spontaneous ventilation    Post pain: adequate analgesia    Post assessment: no apparent anesthetic complications    Post vital signs: stable    Level of consciousness: awake, alert and oriented    Nausea/Vomiting: no nausea/vomiting    Complications: none    Transfer of care protocol was followed      Last vitals:   Visit Vitals  /69 (BP Location: Left arm, Patient Position: Lying)   Pulse (!) 53   Temp 36.7 °C (98.1 °F) (Temporal)   Resp 18   Ht 5' 8" (1.727 m)   Wt 84.8 kg (187 lb)   SpO2 99%   BMI 28.43 kg/m²     "

## 2018-05-13 NOTE — ED NOTES
General surgery and anesthesia consents obtained.  Gen surg resident has surgical consent.  Pt changes to gown with all clothing removed, shoes and wedding band bagged and given to wife.

## 2018-05-13 NOTE — ED NOTES
Verified with Dr. Sheffield that pt. Can receive potassium chloride 20 mEq infusion with a potassium level of 4.3 and without a cardiac monitor. Will continue to monitor the pt.

## 2018-05-13 NOTE — NURSING
Received report from Dain MOSES in recovery. Pt arrived to OBS unit via stretcher. Pt accompanied by RN. Pt aaox4. Pt with lap sites x3. Steri strips intact. C/o pain 6/10, bed at lowest level wheel locked. Call light within pt reach.

## 2018-05-13 NOTE — ED TRIAGE NOTES
"Right sided flank pain onset yesterday.  States he worked out Thursday and "my legs were totally sore".   Denies vomiting and diarrhea but reports a slight nausea.  Pain radiates mildly into lower abdomen.   Pain worse with movement, relieved somewhat when lying flat.    "

## 2018-05-13 NOTE — ED PROVIDER NOTES
"Encounter Date: 5/13/2018    SCRIBE #1 NOTE: I, Igor Blackwell, am scribing for, and in the presence of,  Dr. Oates. I have scribed the following portions of the note - Other sections scribed: MDM.       History     Chief Complaint   Patient presents with    Abdominal Pain     44 yo M with pmhx umbilical hernia repair presents with abdominal pain. Pt endorses pain to right flank with radiation to RLQ. Pain has been constant since yesterday morning. At rest it is "dull" in quality. It is worsened with movement and palpation. 4/10 severity currently. Associated nausea. No vomiting, diarrhea, constipation. No dysuria, hematuria. Pt reports he has been working out more lately, last strenuous workout was Thursday and the pain did not begin until Saturday. No fevers, chills, chest pain, or shortness of breath. He does endorse a few week history of a non-productive cough. No history of similar pain.      The history is provided by the patient and medical records.     Review of patient's allergies indicates:  No Known Allergies  History reviewed. No pertinent past medical history.  Past Surgical History:   Procedure Laterality Date    LASIK Bilateral     UMBILICAL HERNIA REPAIR  10/16/13    open repair without mesh for 1cm defect; Dr. Rosales, [facility]     Family History   Problem Relation Age of Onset    Arthritis Mother     Cancer Father     Diabetes Father     Cataracts Father     Allergies Son         food allergies    Amblyopia Neg Hx     Blindness Neg Hx     Glaucoma Neg Hx     Macular degeneration Neg Hx     Retinal detachment Neg Hx     Strabismus Neg Hx      Social History   Substance Use Topics    Smoking status: Former Smoker     Quit date: 8/24/2002    Smokeless tobacco: Not on file    Alcohol use No     Review of Systems   Constitutional: Negative for fever.   HENT: Negative for sore throat.    Respiratory: Positive for cough. Negative for shortness of breath.    Cardiovascular: Negative for chest " pain.   Gastrointestinal: Positive for abdominal pain and nausea. Negative for abdominal distention, constipation, diarrhea and vomiting.   Genitourinary: Negative for dysuria.   Musculoskeletal: Negative for back pain.   Skin: Negative for rash.   Neurological: Negative for weakness.   Hematological: Does not bruise/bleed easily.       Physical Exam     Initial Vitals [05/13/18 0653]   BP Pulse Resp Temp SpO2   (!) 141/75 80 18 98.3 °F (36.8 °C) 98 %      MAP       97         Physical Exam    Nursing note and vitals reviewed.  Constitutional: He appears well-developed and well-nourished. He is not diaphoretic. No distress.   HENT:   Head: Normocephalic and atraumatic.   Eyes: Conjunctivae and EOM are normal.   Neck: Normal range of motion. Neck supple. No JVD present.   Cardiovascular: Normal rate, regular rhythm, normal heart sounds and intact distal pulses. Exam reveals no gallop and no friction rub.    No murmur heard.  Pulmonary/Chest: Breath sounds normal. No respiratory distress. He has no wheezes. He has no rhonchi. He has no rales. He exhibits no tenderness.   Abdominal: Soft. Bowel sounds are normal. He exhibits no distension and no mass. There is no hepatosplenomegaly. There is tenderness in the right lower quadrant. There is no rebound, no guarding, no CVA tenderness and negative Vazquez's sign. No hernia.   Musculoskeletal: Normal range of motion. He exhibits no tenderness.   Lymphadenopathy:     He has no cervical adenopathy.   Neurological: He is alert and oriented to person, place, and time. He has normal strength. No sensory deficit.   Skin: Skin is warm and dry. Capillary refill takes less than 2 seconds.   Psychiatric: He has a normal mood and affect.         ED Course   Procedures  Labs Reviewed   URINALYSIS, REFLEX TO URINE CULTURE - Abnormal; Notable for the following:        Result Value    Appearance, UA Hazy (*)     All other components within normal limits    Narrative:     Preferred  Collection Type->Urine, Clean Catch   CBC W/ AUTO DIFFERENTIAL - Abnormal; Notable for the following:     MCH 26.9 (*)     Gran% 77.4 (*)     Lymph% 14.0 (*)     All other components within normal limits   COMPREHENSIVE METABOLIC PANEL - Abnormal; Notable for the following:     Glucose 121 (*)     Anion Gap 7 (*)     All other components within normal limits   ISTAT PROCEDURE - Abnormal; Notable for the following:     POC Glucose 120 (*)     All other components within normal limits   LIPASE   ISTAT CHEM8     Imaging Results          CT Abdomen Pelvis With Contrast (Final result)  Result time 05/13/18 09:22:10    Final result by Chacho Wheeler DO (05/13/18 09:22:10)                 Impression:      Enlarged thickened appendix with slight periappendiceal fat stranding and few prominent right ileocolic lymph nodes most compatible with acute appendicitis.    Please note there is a peripheral enhancing collection along the appendiceal tip measuring 1.6 cm in diameter which may represent loculated perforation with underlying appendiceal mass lesion felt less likely though not excluded.  Clinical correlation and surgical evaluation recommended    There is no free intraperitoneal gas or fluid.      Electronically signed by: Chacho Wheeler DO  Date:    05/13/2018  Time:    09:22             Narrative:    EXAMINATION:  CT ABDOMEN PELVIS WITH CONTRAST    CLINICAL HISTORY:  Flank pain, stone disease suspected;RLQ pain, appendicitis suspected;R flank/RLQ pain;    TECHNIQUE:  Low dose axial images, sagittal and coronal reformations were obtained from the lung bases to the pubic symphysis following the IV administration of 75 mL of Omnipaque 350 .  Oral contrast was not given.    COMPARISON:  None.    FINDINGS:  There is no consolidation within the visualized lungs with few linear opacities in the lower lobes left greater than right suggesting atelectasis versus scarring.    The liver, spleen and pancreas are normal in size  without focal lesions.  No calcified gallstones in the gallbladder by CT criteria.    The kidneys are normal in size measuring approximately 10 cm in length.  There is uptake and excretion of contrast bilaterally.  No hydronephrosis.  Incidental circumaortic left renal vein with unusual vascular supply along the inferior pole left kidney with both arterial and venous components..    There is no free intraperitoneal gas or fluid.    There is diffusely thickened appendix with slight appendiceal fat stranding induration and few prominent right ileocolic lymph nodes.  The appendix is enlarged measuring 1.1 cm in diameter however there is a focal peripheral enhancing collection along the distal appendiceal tip which measures 1.6 cm in diameter.  Overall concerning for acute appendicitis and possible loculated distal appendiceal perforation alternative differential includes distal appendiceal mass.  Clinical correlation and surgical evaluation recommended.    No free intraperitoneal gas or fluid.    No focal pelvic mass or adenopathy.    no evidence for acute fracture or subluxation of the visualized spine                                        Medical Decision Making:   History:   Old Medical Records: I decided to obtain old medical records.  Initial Assessment:   43 y.o. M with pmhx umbilical hernia repair presents with right flank/RLQ abdominal pain. This patient's differential diagnosis includes, but is not limited to: pneumonia, ureteral stone, gastritis, hepatobiliary issues, acute cholecystitis, appendicitis, bowel obstruction, gas pains, gastroesophageal reflux, urinary tract infection, pyelonephritis, cystitis, gastric ulcer, duodenal ulcer, viscous perforation, biliary colic, cholecystitis, pancreatitis, abdominal hernia, internal hernia.    Pt offered analgesia, but declined. Will obtain labs and CT scan.    REASSESSMENT:   8:15 AM  Serum and urine labs clear. Awaiting CT scan.     9:42 AM.  CT consistent with  appendicitis. General Surgery consulted for admission. Will administer Ceftriaxone and Flagyl.   Clinical Tests:   Lab Tests: Ordered and Reviewed  Radiological Study: Ordered and Reviewed            Scribe Attestation:   Scribe #1: I performed the above scribed service and the documentation accurately describes the services I performed. I attest to the accuracy of the note.    Attending Attestation:           Physician Attestation for Scribe:      Comments: I, Dr. Gregory Oates, personally performed the services described in this documentation. All medical record entries made by the scribe were at my direction and in my presence.  I have reviewed the chart and agree that the record reflects my personal performance and is accurate and complete. Gregory Oates MD.  12:31 PM 05/13/2018                 Clinical Impression:   The encounter diagnosis was Acute appendicitis, unspecified acute appendicitis type.                           Gregory Oates MD  05/13/18 1231

## 2018-05-13 NOTE — ANESTHESIA PREPROCEDURE EVALUATION
05/13/2018  Pre-operative evaluation for Procedure(s) (LRB):  APPENDECTOMY-LAPAROSCOPIC (N/A)    Gautam Ross is a 43 y.o. male without significant PMH who presents for the procedure above secondary to acute appendicitis.  NPO since 7pm.     LDA: 18G LAC    Prev airway: Direct laryngoscopy; Inserted by: CRNA; Airway Device: Endotracheal Tube; Intubated: Postinduction; Blade: Hernandez #2; Airway Device Size: 8.0; Style: Cuffed; Cuff Inflation: Minimal occlusive pressure; Inflation Amount: 5; Placement Verified By: Auscultation, Capnometry; Grade: Grade I; Complicating Factors: None;     Drips:  None     Patient Active Problem List   Diagnosis    Acute appendicitis       Review of patient's allergies indicates:  No Known Allergies     No current facility-administered medications on file prior to encounter.      Current Outpatient Prescriptions on File Prior to Encounter   Medication Sig Dispense Refill    fluticasone (FLONASE) 50 mcg/actuation nasal spray 1 spray by Each Nare route once daily. Each nostril daily      valacyclovir (VALTREX) 1000 MG tablet Take 1 tablet (1,000 mg total) by mouth 3 (three) times daily. 21 tablet 0       Past Surgical History:   Procedure Laterality Date    LASIK Bilateral     UMBILICAL HERNIA REPAIR  10/16/13    open repair without mesh for 1cm defect; Dr. Rosales, [facility]       Social History     Social History    Marital status:      Spouse name: N/A    Number of children: N/A    Years of education: N/A     Occupational History     [employer]     Social History Main Topics    Smoking status: Former Smoker     Quit date: 8/24/2002    Smokeless tobacco: Not on file    Alcohol use No    Drug use: No    Sexual activity: Not on file     Other Topics Concern    Not on file     Social History Narrative    No narrative on file         Vital Signs  Range (Last 24H):  Temp:  [36.8 °C (98.3 °F)]   Pulse:  [67-80]   Resp:  [15-18]   BP: (124-141)/(67-75)   SpO2:  [97 %-98 %]       CBC:   Recent Labs      05/13/18   0722  05/13/18   0726   WBC  8.49   --    RBC  5.58   --    HGB  15.0   --    HCT  46.3  45   PLT  215   --    MCV  83   --    MCH  26.9*   --    MCHC  32.4   --        CMP:   Recent Labs      05/13/18   0722   NA  138   K  4.3   CL  104   CO2  27   BUN  18   CREATININE  1.0   GLU  121*   CALCIUM  9.3   ALBUMIN  4.1   PROT  7.9   ALKPHOS  109   ALT  28   AST  19   BILITOT  0.7       INR  No results for input(s): PT, INR, PROTIME, APTT in the last 72 hours.        Diagnostic Studies:      EKG:  None on file       2D Echo:  None on file           Anesthesia Evaluation    I have reviewed the Patient Summary Reports.    I have reviewed the Nursing Notes.   I have reviewed the Medications.     Review of Systems  Anesthesia Hx:  No problems with previous Anesthesia  History of prior surgery of interest to airway management or planning: Previous anesthesia: General Denies Family Hx of Anesthesia complications.   Denies Personal Hx of Anesthesia complications.   Social:  Former Smoker    Cardiovascular:  Cardiovascular Normal     Pulmonary:  Pulmonary Normal    Hepatic/GI:  Hepatic/GI Normal    Neurological:  Neurology Normal    Endocrine:  Endocrine Normal        Physical Exam  General:  Well nourished    Airway/Jaw/Neck:  Airway Findings: Mouth Opening: Normal Tongue: Normal  General Airway Assessment: Adult  Mallampati: III  Improves to II with phonation.  TM Distance: Normal, at least 6 cm  Jaw/Neck Findings:     Neck ROM: Normal ROM      Dental:  Dental Findings: In tact   Chest/Lungs:  Chest/Lungs Findings: Clear to auscultation, Normal Respiratory Rate     Heart/Vascular:  Heart Findings: Rate: Normal  Rhythm: Regular Rhythm  Sounds: Normal             Anesthesia Plan  Type of Anesthesia, risks & benefits discussed:  Anesthesia Type:  general  Patient's  Preference:   Intra-op Monitoring Plan: standard ASA monitors  Intra-op Monitoring Plan Comments:   Post Op Pain Control Plan: multimodal analgesia  Post Op Pain Control Plan Comments:   Induction:   IV  Beta Blocker:  Patient is not currently on a Beta-Blocker (No further documentation required).       Informed Consent: Patient understands risks and agrees with Anesthesia plan.  Questions answered. Anesthesia consent signed with patient.  ASA Score: 1     Day of Surgery Review of History & Physical:    H&P update referred to the surgeon.         Ready For Surgery From Anesthesia Perspective.

## 2018-05-13 NOTE — ANESTHESIA RELEASE NOTE
"Anesthesia Release from PACU Note    Patient: Gautam Ross    Procedure(s) Performed: Procedure(s) (LRB):  APPENDECTOMY-LAPAROSCOPIC (N/A)    Final Anesthesia Type: general  Patient location during evaluation: PACU  Patient participation: Yes- Able to Participate  Level of consciousness: awake and alert  Post-procedure vital signs: reviewed and stable  Pain management: adequate  Airway patency: patent  PONV status at discharge: No PONV  Anesthetic complications: no      Cardiovascular status: blood pressure returned to baseline  Respiratory status: unassisted  Hydration status: euvolemic  Follow-up not needed.        Visit Vitals  /63 (BP Location: Left arm, Patient Position: Lying)   Pulse (!) 47   Temp 36.7 °C (98.1 °F) (Temporal)   Resp 18   Ht 5' 8" (1.727 m)   Wt 84.8 kg (187 lb)   SpO2 100%   BMI 28.43 kg/m²       Pain/Marily Score: Pain Rating Prior to Med Admin: 5 (5/13/2018 12:03 PM)  "

## 2018-05-13 NOTE — PLAN OF CARE
Problem: Patient Care Overview  Goal: Plan of Care Review  Outcome: Ongoing (interventions implemented as appropriate)  Pt with no falls or injuries this shift. Pt afebrile, post op sites benign. No s/s infection. Pt reports pain level 5/10.

## 2018-05-13 NOTE — NURSING TRANSFER
Nursing Transfer Note      5/13/2018     Transfer To: OBS2    Transfer via stretcher    Transfer with n/a    Transported by pct    Medicines sent: n/a    Chart send with patient: Yes    Notified: spouse

## 2018-05-14 ENCOUNTER — NURSE TRIAGE (OUTPATIENT)
Dept: ADMINISTRATIVE | Facility: CLINIC | Age: 43
End: 2018-05-14

## 2018-05-14 NOTE — NURSING
Order to d/c home today. Saline lock removed. VSS. Discharge instructions given to pt and spouse. Pt and spouse verbalized understanding. Pt discharged from OBS unit ambulating. Pt accompanied by spouse. All personal belongings taken home by pt.

## 2018-05-14 NOTE — OP NOTE
DATE OF PROCEDURE:  05/13/2018    PREOPERATIVE DIAGNOSIS:  Acute appendicitis.    POSTOPERATIVE DIAGNOSIS:  Acute appendicitis.    PROCEDURE:  Laparoscopic appendectomy.    SURGEON:  Arsalan Carmona M.D.    ASSISTANT:  Shaheen Sheffield Jr, M.D. (RES)    ANESTHESIA:  General.    CLINICAL NOTE:  The patient is a 43-year-old male with clinical and radiographic   evidence of acute appendicitis.    PROCEDURE NOTE:  Following induction of adequate general anesthesia, the   patient's abdomen was shaved, prepped and draped in a sterile fashion with   ChloraPrep.  We made a supraumbilical incision, dissected down to the linea   alba, which was grasped between clamps and then incised.  We entered the   peritoneal cavity under direct vision and then placed a balloon tip trocar   through this site.  We insufflated creating a pneumoperitoneum of 15 mmHg   intraabdominal pressure.  We then placed a laparoscopic camera through this   port, which documented our proper positioning and allowed under direct vision   the placement of two midline 5 mm ports.  We then explored the abdomen and   mobilized a very inflamed appendix and we were able to isolate it from its   attachments to surrounding tissue.  We came across this appendix at its base and   junction with the cecum with an Endo-LUPE stapler and then came across the   mesoappendix with a vascular load of the same device.  We then removed the   appendix through an EndoCatch bag and sent it to Pathology.  We then irrigated   and inspected all staple lines for hemostasis, which was adequate.  We then   removed all instruments and relieved the pneumoperitoneum.  We closed the fascia   at the supraumbilical port site with 0 Vicryl suture.  We infiltrated Marcaine   for postoperative pain control in all incisions and then closed all skin   incisions with subcuticular 4-0 Monocryl suture.  Sterile dressings were applied   and the procedure was terminated with the patient tolerating it  well.      MCT/RENE  dd: 05/14/2018 11:07:46 (CDT)  td: 05/14/2018 11:31:12 (CDT)  Doc ID   #[id number]  Job ID #[id number]    CC:

## 2018-05-15 ENCOUNTER — TELEPHONE (OUTPATIENT)
Dept: SURGERY | Facility: CLINIC | Age: 43
End: 2018-05-15

## 2018-05-15 NOTE — TELEPHONE ENCOUNTER
Spoke with patient as a f/u to his Lap Appendectomy with Dr. Carmona on 5/13/18.  He reports feeling well and has controlled pain.  He did have a fever of 100F yesterday but it has resolved.  He had a cough prior to the surgery, but he increased his fluid intake and is deep breathing and feels better.  He will f/u as scheduled on 5/29/18.  He verbalized understanding and is agreeable to this plan of care.

## 2018-05-15 NOTE — TELEPHONE ENCOUNTER
Reason for Disposition   Other post-op symptom or question (all triage questions negative)     Home care advice given to Gautam for cough (he had viral cough for 2-3 weeks prior to his appendectomy done yesterday by Dr Arsalan Carmona.  He has temp of 100.1, no other issues.  Encouraged taking one extra regular strength tylenol with his percocet for fever, drink plenty of water, deep breathing exercises, and post-op care as directed by Dr Carmona.  He will call back for temp 100.5 or greater, or any other issues.  Message to Dr Carmona.  Please contact caller directly with any additional care advice.    Additional Information   Negative: Fever > 100.5 F (38.1 C)     Temp 100.1    Protocols used: ST POST-OP SYMPTOMS AND [id number]

## 2018-05-20 ENCOUNTER — PATIENT MESSAGE (OUTPATIENT)
Dept: SURGERY | Facility: CLINIC | Age: 43
End: 2018-05-20

## 2018-05-21 ENCOUNTER — PATIENT MESSAGE (OUTPATIENT)
Dept: SURGERY | Facility: CLINIC | Age: 43
End: 2018-05-21

## 2018-05-22 ENCOUNTER — NURSE TRIAGE (OUTPATIENT)
Dept: ADMINISTRATIVE | Facility: CLINIC | Age: 43
End: 2018-05-22

## 2018-05-23 ENCOUNTER — PATIENT MESSAGE (OUTPATIENT)
Dept: SURGERY | Facility: CLINIC | Age: 43
End: 2018-05-23

## 2018-05-23 ENCOUNTER — OFFICE VISIT (OUTPATIENT)
Dept: SURGERY | Facility: CLINIC | Age: 43
DRG: 862 | End: 2018-05-23
Payer: COMMERCIAL

## 2018-05-23 VITALS
BODY MASS INDEX: 27.67 KG/M2 | TEMPERATURE: 99 F | DIASTOLIC BLOOD PRESSURE: 65 MMHG | HEART RATE: 109 BPM | HEIGHT: 68 IN | SYSTOLIC BLOOD PRESSURE: 114 MMHG | WEIGHT: 182.56 LBS

## 2018-05-23 DIAGNOSIS — R10.84 GENERALIZED ABDOMINAL PAIN: ICD-10-CM

## 2018-05-23 PROCEDURE — 99024 POSTOP FOLLOW-UP VISIT: CPT | Mod: S$GLB,,, | Performed by: SURGERY

## 2018-05-23 PROCEDURE — 99999 PR PBB SHADOW E&M-EST. PATIENT-LVL III: CPT | Mod: PBBFAC,,, | Performed by: SURGERY

## 2018-05-23 NOTE — TELEPHONE ENCOUNTER
"    Reason for Disposition   [1] MILD-MODERATE post-op pain (e.g., pain scale 1-7) AND [2] not controlled with pain medications    Answer Assessment - Initial Assessment Questions  1. SYMPTOM: "What's the main symptom you're concerned about?" (e.g., pain, fever, vomiting)      Pain was reducing post op to around 3-4/10 the last few days, and today is 5/10 on pain scale with taking ibuprofen at 1600 and earlier this morning. Also patient has a temperature of 100.1 (forehead)  2. ONSET: "When did ________  start?"      This evening  3. SURGERY: "What surgery was performed?"      appendectomy  4. DATE of SURGERY: "When was surgery performed?"       5/13/18  5. ANESTHESIA: " What type of anesthesia did you have?" (e.g., general, spinal, epidural, local)      n/a  6. PAIN: "Is there any pain?" If so, ask: "How bad is it?"  (Scale 1-10; or mild, moderate, severe)      Yes see above  7. FEVER: "Do you have a fever?" If so, ask: "What is your temperature, how was it measured, and when did it start?"      See above  8. VOMITING: "Is there any vomiting?" If yes, ask: "How many times?"      denies  9. BLEEDING: "Is there any bleeding?" If so, ask: "How much?" and "Where?"      Denies bleeding; incision site looks clean dry and no redness  10. OTHER SYMPTOMS: "Do you have any other symptoms?" (e.g., drainage from wound, painful urination, constipation)        none    Protocols used: ST POST-OP SYMPTOMS AND [id number]      "

## 2018-05-23 NOTE — PROGRESS NOTES
HPI:  The patient is status post-laparoscopic appendectomy on 5/13/18. He was feeling better after surgery, but has since started feeling bad. He is still having abdominal pain, only trying to take tylenol for the pain. He is not eating well, not like he used to. He denies nausea or vomiting. He states he has intermittent diarrhea. He reports fevers of around 100. He reports no drainage, or redness around incisions.     PHYSICAL EXAM:  Physical Exam   Constitutional: He is oriented to person, place, and time. He appears well-developed and well-nourished. No distress.   HENT:   Head: Normocephalic and atraumatic.   Eyes: EOM are normal. No scleral icterus.   Cardiovascular: Normal rate and regular rhythm.    Pulmonary/Chest: Effort normal. No respiratory distress.   Abdominal:   Soft, minimal TTP in RLQ; incisions - clean, dry and intact, no signs or symptoms of infection appreciated    Musculoskeletal: Normal range of motion. He exhibits no edema or tenderness.   Neurological: He is alert and oriented to person, place, and time.   Skin: Skin is warm and dry.   Psychiatric: He has a normal mood and affect. His behavior is normal.       A/P  44 yo male s/p laparoscopic appendectomy 5/13/18  -will obtain CBC today, planning for CT abd/pelv as soon as can be scheduled; will call patient with results  -no indications for admit at this time  -continue regular diet as tolerated  -no heavy lifting, nothing heavier than 10lbs

## 2018-05-24 ENCOUNTER — HOSPITAL ENCOUNTER (INPATIENT)
Facility: HOSPITAL | Age: 43
LOS: 4 days | Discharge: HOME | DRG: 862 | End: 2018-05-28
Attending: SURGERY | Admitting: SURGERY
Payer: COMMERCIAL

## 2018-05-24 ENCOUNTER — HOSPITAL ENCOUNTER (OUTPATIENT)
Dept: RADIOLOGY | Facility: OTHER | Age: 43
Discharge: HOME | DRG: 862 | End: 2018-05-24
Attending: FAMILY MEDICINE
Payer: COMMERCIAL

## 2018-05-24 DIAGNOSIS — R10.84 GENERALIZED ABDOMINAL PAIN: ICD-10-CM

## 2018-05-24 DIAGNOSIS — K35.890 OTHER ACUTE APPENDICITIS: ICD-10-CM

## 2018-05-24 DIAGNOSIS — T81.43XA POSTPROCEDURAL INTRAABDOMINAL ABSCESS: ICD-10-CM

## 2018-05-24 DIAGNOSIS — K65.1 INTRA-ABDOMINAL ABSCESS: Primary | ICD-10-CM

## 2018-05-24 PROCEDURE — 25000003 PHARM REV CODE 250: Performed by: PHYSICIAN ASSISTANT

## 2018-05-24 PROCEDURE — S0030 INJECTION, METRONIDAZOLE: HCPCS | Performed by: PHYSICIAN ASSISTANT

## 2018-05-24 PROCEDURE — 25500020 PHARM REV CODE 255: Performed by: FAMILY MEDICINE

## 2018-05-24 PROCEDURE — 74177 CT ABD & PELVIS W/CONTRAST: CPT | Mod: 26,,, | Performed by: RADIOLOGY

## 2018-05-24 PROCEDURE — 74177 CT ABD & PELVIS W/CONTRAST: CPT | Mod: TC

## 2018-05-24 PROCEDURE — 11000001 HC ACUTE MED/SURG PRIVATE ROOM

## 2018-05-24 RX ORDER — METRONIDAZOLE 500 MG/100ML
500 INJECTION, SOLUTION INTRAVENOUS
Status: DISCONTINUED | OUTPATIENT
Start: 2018-05-24 | End: 2018-05-26

## 2018-05-24 RX ORDER — ONDANSETRON 8 MG/1
8 TABLET, ORALLY DISINTEGRATING ORAL EVERY 8 HOURS PRN
Status: DISCONTINUED | OUTPATIENT
Start: 2018-05-24 | End: 2018-05-28 | Stop reason: HOSPADM

## 2018-05-24 RX ORDER — ACETAMINOPHEN 325 MG/1
650 TABLET ORAL EVERY 8 HOURS PRN
Status: DISCONTINUED | OUTPATIENT
Start: 2018-05-24 | End: 2018-05-26

## 2018-05-24 RX ORDER — OXYCODONE AND ACETAMINOPHEN 5; 325 MG/1; MG/1
1 TABLET ORAL EVERY 4 HOURS PRN
Status: DISCONTINUED | OUTPATIENT
Start: 2018-05-24 | End: 2018-05-28 | Stop reason: HOSPADM

## 2018-05-24 RX ORDER — DIPHENHYDRAMINE HYDROCHLORIDE 50 MG/ML
25 INJECTION INTRAMUSCULAR; INTRAVENOUS EVERY 4 HOURS PRN
Status: DISCONTINUED | OUTPATIENT
Start: 2018-05-24 | End: 2018-05-28 | Stop reason: HOSPADM

## 2018-05-24 RX ORDER — SODIUM CHLORIDE, SODIUM LACTATE, POTASSIUM CHLORIDE, CALCIUM CHLORIDE 600; 310; 30; 20 MG/100ML; MG/100ML; MG/100ML; MG/100ML
INJECTION, SOLUTION INTRAVENOUS CONTINUOUS
Status: DISCONTINUED | OUTPATIENT
Start: 2018-05-24 | End: 2018-05-28 | Stop reason: HOSPADM

## 2018-05-24 RX ORDER — OXYCODONE AND ACETAMINOPHEN 10; 325 MG/1; MG/1
1 TABLET ORAL EVERY 4 HOURS PRN
Status: DISCONTINUED | OUTPATIENT
Start: 2018-05-24 | End: 2018-05-28 | Stop reason: HOSPADM

## 2018-05-24 RX ADMIN — METRONIDAZOLE 500 MG: 500 INJECTION, SOLUTION INTRAVENOUS at 06:05

## 2018-05-24 RX ADMIN — SODIUM CHLORIDE, SODIUM LACTATE, POTASSIUM CHLORIDE, AND CALCIUM CHLORIDE: .6; .31; .03; .02 INJECTION, SOLUTION INTRAVENOUS at 06:05

## 2018-05-24 RX ADMIN — IOHEXOL 75 ML: 350 INJECTION, SOLUTION INTRAVENOUS at 10:05

## 2018-05-25 LAB
ALBUMIN SERPL BCP-MCNC: 2.7 G/DL
ALP SERPL-CCNC: 91 U/L
ALT SERPL W/O P-5'-P-CCNC: 29 U/L
ANION GAP SERPL CALC-SCNC: 7 MMOL/L
AST SERPL-CCNC: 17 U/L
BASOPHILS # BLD AUTO: 0.04 K/UL
BASOPHILS NFR BLD: 0.3 %
BILIRUB SERPL-MCNC: 0.5 MG/DL
BUN SERPL-MCNC: 16 MG/DL
CALCIUM SERPL-MCNC: 8.9 MG/DL
CHLORIDE SERPL-SCNC: 104 MMOL/L
CO2 SERPL-SCNC: 28 MMOL/L
CREAT SERPL-MCNC: 1.1 MG/DL
DIFFERENTIAL METHOD: ABNORMAL
EOSINOPHIL # BLD AUTO: 0.2 K/UL
EOSINOPHIL NFR BLD: 1.7 %
ERYTHROCYTE [DISTWIDTH] IN BLOOD BY AUTOMATED COUNT: 13.2 %
EST. GFR  (AFRICAN AMERICAN): >60 ML/MIN/1.73 M^2
EST. GFR  (NON AFRICAN AMERICAN): >60 ML/MIN/1.73 M^2
GLUCOSE SERPL-MCNC: 124 MG/DL
HCT VFR BLD AUTO: 37.9 %
HGB BLD-MCNC: 11.9 G/DL
IMM GRANULOCYTES # BLD AUTO: 0.08 K/UL
IMM GRANULOCYTES NFR BLD AUTO: 0.6 %
INR PPP: 1.1
LYMPHOCYTES # BLD AUTO: 1.4 K/UL
LYMPHOCYTES NFR BLD: 10.2 %
MAGNESIUM SERPL-MCNC: 2.3 MG/DL
MCH RBC QN AUTO: 26.2 PG
MCHC RBC AUTO-ENTMCNC: 31.4 G/DL
MCV RBC AUTO: 84 FL
MONOCYTES # BLD AUTO: 1.1 K/UL
MONOCYTES NFR BLD: 8.5 %
NEUTROPHILS # BLD AUTO: 10.4 K/UL
NEUTROPHILS NFR BLD: 78.7 %
NRBC BLD-RTO: 0 /100 WBC
PHOSPHATE SERPL-MCNC: 3.4 MG/DL
PLATELET # BLD AUTO: 342 K/UL
PMV BLD AUTO: 9.6 FL
POTASSIUM SERPL-SCNC: 4.4 MMOL/L
PROT SERPL-MCNC: 6.9 G/DL
PROTHROMBIN TIME: 11.5 SEC
RBC # BLD AUTO: 4.54 M/UL
SODIUM SERPL-SCNC: 139 MMOL/L
WBC # BLD AUTO: 13.21 K/UL

## 2018-05-25 PROCEDURE — 87070 CULTURE OTHR SPECIMN AEROBIC: CPT

## 2018-05-25 PROCEDURE — S0030 INJECTION, METRONIDAZOLE: HCPCS | Performed by: PHYSICIAN ASSISTANT

## 2018-05-25 PROCEDURE — 85610 PROTHROMBIN TIME: CPT

## 2018-05-25 PROCEDURE — 63600175 PHARM REV CODE 636 W HCPCS: Performed by: RADIOLOGY

## 2018-05-25 PROCEDURE — 87075 CULTR BACTERIA EXCEPT BLOOD: CPT

## 2018-05-25 PROCEDURE — 0W9G3ZX DRAINAGE OF PERITONEAL CAVITY, PERCUTANEOUS APPROACH, DIAGNOSTIC: ICD-10-PCS | Performed by: RADIOLOGY

## 2018-05-25 PROCEDURE — 87077 CULTURE AEROBIC IDENTIFY: CPT

## 2018-05-25 PROCEDURE — 63600175 PHARM REV CODE 636 W HCPCS: Performed by: PHYSICIAN ASSISTANT

## 2018-05-25 PROCEDURE — 36415 COLL VENOUS BLD VENIPUNCTURE: CPT

## 2018-05-25 PROCEDURE — 84100 ASSAY OF PHOSPHORUS: CPT

## 2018-05-25 PROCEDURE — 25000003 PHARM REV CODE 250: Performed by: PHYSICIAN ASSISTANT

## 2018-05-25 PROCEDURE — 87205 SMEAR GRAM STAIN: CPT

## 2018-05-25 PROCEDURE — 80053 COMPREHEN METABOLIC PANEL: CPT

## 2018-05-25 PROCEDURE — 85025 COMPLETE CBC W/AUTO DIFF WBC: CPT

## 2018-05-25 PROCEDURE — 87076 CULTURE ANAEROBE IDENT EACH: CPT

## 2018-05-25 PROCEDURE — 11000001 HC ACUTE MED/SURG PRIVATE ROOM

## 2018-05-25 PROCEDURE — 83735 ASSAY OF MAGNESIUM: CPT

## 2018-05-25 PROCEDURE — 87186 SC STD MICRODIL/AGAR DIL: CPT

## 2018-05-25 RX ORDER — FENTANYL CITRATE 50 UG/ML
INJECTION, SOLUTION INTRAMUSCULAR; INTRAVENOUS CODE/TRAUMA/SEDATION MEDICATION
Status: COMPLETED | OUTPATIENT
Start: 2018-05-25 | End: 2018-05-25

## 2018-05-25 RX ORDER — MIDAZOLAM HYDROCHLORIDE 1 MG/ML
INJECTION INTRAMUSCULAR; INTRAVENOUS CODE/TRAUMA/SEDATION MEDICATION
Status: COMPLETED | OUTPATIENT
Start: 2018-05-25 | End: 2018-05-25

## 2018-05-25 RX ADMIN — FENTANYL CITRATE 50 MCG: 50 INJECTION, SOLUTION INTRAMUSCULAR; INTRAVENOUS at 03:05

## 2018-05-25 RX ADMIN — METRONIDAZOLE 500 MG: 500 INJECTION, SOLUTION INTRAVENOUS at 07:05

## 2018-05-25 RX ADMIN — ONDANSETRON 8 MG: 8 TABLET, ORALLY DISINTEGRATING ORAL at 04:05

## 2018-05-25 RX ADMIN — OXYCODONE HYDROCHLORIDE AND ACETAMINOPHEN 1 TABLET: 5; 325 TABLET ORAL at 09:05

## 2018-05-25 RX ADMIN — SODIUM CHLORIDE, SODIUM LACTATE, POTASSIUM CHLORIDE, AND CALCIUM CHLORIDE: .6; .31; .03; .02 INJECTION, SOLUTION INTRAVENOUS at 12:05

## 2018-05-25 RX ADMIN — CEFTRIAXONE SODIUM 2 G: 2 INJECTION, POWDER, FOR SOLUTION INTRAMUSCULAR; INTRAVENOUS at 04:05

## 2018-05-25 RX ADMIN — METRONIDAZOLE 500 MG: 500 INJECTION, SOLUTION INTRAVENOUS at 01:05

## 2018-05-25 RX ADMIN — OXYCODONE HYDROCHLORIDE AND ACETAMINOPHEN 1 TABLET: 10; 325 TABLET ORAL at 07:05

## 2018-05-25 RX ADMIN — OXYCODONE HYDROCHLORIDE AND ACETAMINOPHEN 1 TABLET: 10; 325 TABLET ORAL at 04:05

## 2018-05-25 RX ADMIN — METRONIDAZOLE 500 MG: 500 INJECTION, SOLUTION INTRAVENOUS at 11:05

## 2018-05-25 RX ADMIN — OXYCODONE HYDROCHLORIDE AND ACETAMINOPHEN 1 TABLET: 10; 325 TABLET ORAL at 11:05

## 2018-05-25 RX ADMIN — ACETAMINOPHEN 650 MG: 325 TABLET, FILM COATED ORAL at 07:05

## 2018-05-25 RX ADMIN — MIDAZOLAM HYDROCHLORIDE 1 MG: 1 INJECTION, SOLUTION INTRAMUSCULAR; INTRAVENOUS at 03:05

## 2018-05-25 NOTE — SUBJECTIVE & OBJECTIVE
Current Facility-Administered Medications on File Prior to Encounter   Medication    [COMPLETED] omnipaque 350 iohexol 75 mL    omnipaque oral solution 30 mL     Current Outpatient Prescriptions on File Prior to Encounter   Medication Sig    fluticasone (FLONASE) 50 mcg/actuation nasal spray 1 spray by Each Nare route once daily. Each nostril daily    oxyCODONE-acetaminophen (PERCOCET) 5-325 mg per tablet Take 1-2 tablets by mouth every 4 to 6 hours as needed for Pain.    valacyclovir (VALTREX) 1000 MG tablet Take 1 tablet (1,000 mg total) by mouth 3 (three) times daily.       Review of patient's allergies indicates:  No Known Allergies    History reviewed. No pertinent past medical history.  Past Surgical History:   Procedure Laterality Date    LASIK Bilateral     UMBILICAL HERNIA REPAIR  10/16/13    open repair without mesh for 1cm defect; Dr. Rosales, [facility] [place]     Family History     Problem Relation (Age of Onset)    Allergies Son    Arthritis Mother    Cancer Father    Cataracts Father    Diabetes Father        Social History Main Topics    Smoking status: Former Smoker     Quit date: 8/24/2002    Smokeless tobacco: Not on file    Alcohol use No    Drug use: No    Sexual activity: Not on file     Review of Systems   Constitutional: Positive for appetite change and fever.   HENT: Negative.    Eyes: Negative.    Respiratory: Negative.    Cardiovascular: Negative.    Gastrointestinal: Positive for abdominal pain.   Endocrine: Negative.    Genitourinary: Negative.    Musculoskeletal: Negative.    Allergic/Immunologic: Negative.    Neurological: Negative.      Objective:     Vital Signs (Most Recent):  Temp: (!) 100.7 °F (38.2 °C) (05/24/18 1721)  Pulse: 94 (05/24/18 1721)  Resp: 18 (05/24/18 1721)  BP: 116/68 (05/24/18 1721)  SpO2: 95 % (05/24/18 1721) Vital Signs (24h Range):  Temp:  [100.7 °F (38.2 °C)] 100.7 °F (38.2 °C)  Pulse:  [94] 94  Resp:  [18] 18  SpO2:  [95 %] 95 %  BP: (116)/(68) 116/68      Weight: 79.9 kg (176 lb 3.2 oz)  Body mass index is 26.79 kg/m².    Physical Exam   Constitutional: He is oriented to person, place, and time. He appears well-developed and well-nourished.   HENT:   Head: Normocephalic and atraumatic.   Eyes: Conjunctivae and EOM are normal. No scleral icterus.   Neck: Normal range of motion. Neck supple. No tracheal deviation present.   Cardiovascular: Normal rate and intact distal pulses.    Pulmonary/Chest: Effort normal. No respiratory distress.   Abdominal: Soft. He exhibits no distension and no mass. There is tenderness (minimal TTP in RLQ).    incisions - clean, dry and intact, no signs or symptoms of infection appreciated   Musculoskeletal: Normal range of motion. He exhibits no edema or deformity.   Neurological: He is alert and oriented to person, place, and time.   Skin: Skin is warm and dry. No erythema.   Psychiatric: He has a normal mood and affect. His behavior is normal. Thought content normal.   Nursing note and vitals reviewed.    Significant Labs:  CBC:   Recent Labs  Lab 05/23/18  1603   WBC 13.48*   RBC 4.77   HGB 12.9*   HCT 39.8*      MCV 83   MCH 27.0   MCHC 32.4     CMP: No results for input(s): GLU, CALCIUM, ALBUMIN, PROT, NA, K, CO2, CL, BUN, CREATININE, ALKPHOS, ALT, AST, BILITOT in the last 168 hours.    Significant Diagnostics:  I have reviewed and interpreted all pertinent imaging results/findings within the past 24 hours.

## 2018-05-25 NOTE — H&P
Radiology History & Physical      SUBJECTIVE:     Chief Complaint: perforated appendicitis and right abdominal abscess on recent CT. Imaging reviewed. IR consulted for abscess drainage.    History of Present Illness:  Gautam Ross is a 43 y.o. male who presents for right lower quadrant drain placement.  History reviewed. No pertinent past medical history.  Past Surgical History:   Procedure Laterality Date    LASIK Bilateral     UMBILICAL HERNIA REPAIR  10/16/13    open repair without mesh for 1cm defect; Dr. Rosales, [facility] [place]       Home Meds:   Prior to Admission medications    Medication Sig Start Date End Date Taking? Authorizing Provider   fluticasone (FLONASE) 50 mcg/actuation nasal spray 1 spray by Each Nare route once daily. Each nostril daily   Yes Historical Provider, MD   oxyCODONE-acetaminophen (PERCOCET) 5-325 mg per tablet Take 1-2 tablets by mouth every 4 to 6 hours as needed for Pain. 5/13/18  Yes DEBORAH Sheffield Jr., MD   valacyclovir (VALTREX) 1000 MG tablet Take 1 tablet (1,000 mg total) by mouth 3 (three) times daily. 4/17/17 4/17/18  Weston Kwong Jr., MD     Anticoagulants/Antiplatelets: no anticoagulation    Allergies: Review of patient's allergies indicates:  No Known Allergies  Sedation History:  no adverse reactions    Review of Systems:   Hematological: no known coagulopathies  Respiratory: no shortness of breath  Cardiovascular: no chest pain  Gastrointestinal: no abdominal pain  Genito-Urinary: no dysuria  Musculoskeletal: negative  Neurological: no TIA or stroke symptoms         OBJECTIVE:     Vital Signs (Most Recent)  Temp: 98.5 °F (36.9 °C) (05/25/18 1132)  Pulse: 85 (05/25/18 1132)  Resp: 16 (05/25/18 1132)  BP: 110/64 (05/25/18 1132)  SpO2: 97 % (05/25/18 1132)    Physical Exam:  ASA: 3  Mallampati: 2    General: no acute distress  Mental Status: alert and oriented to person, place and time  HEENT: normocephalic, atraumatic  Chest: unlabored breathing  Heart: regular  heart rate  Abdomen: nondistended  Extremity: moves all extremities    Laboratory  Lab Results   Component Value Date    INR 1.1 05/25/2018       Lab Results   Component Value Date    WBC 13.21 (H) 05/25/2018    HGB 11.9 (L) 05/25/2018    HCT 37.9 (L) 05/25/2018    MCV 84 05/25/2018     05/25/2018      Lab Results   Component Value Date     (H) 05/25/2018     05/25/2018    K 4.4 05/25/2018     05/25/2018    CO2 28 05/25/2018    BUN 16 05/25/2018    CREATININE 1.1 05/25/2018    CALCIUM 8.9 05/25/2018    MG 2.3 05/25/2018    ALT 29 05/25/2018    AST 17 05/25/2018    ALBUMIN 2.7 (L) 05/25/2018    BILITOT 0.5 05/25/2018       ASSESSMENT/PLAN:     Sedation Plan: moderate  Patient will undergo right lower quadrant drain placement.    FORREST JACOBS  PGY-III Radiology Resident  [address] [facility]  Pager: [phone]

## 2018-05-25 NOTE — HPI
Gautam Ross is a 43 y.o. male status post-laparoscopic appendectomy on 5/13/18. He was feeling better after surgery, but has since started feeling worse. He is still having abdominal pain, only trying to take tylenol for the pain. He is not eating well, not like he used to. He denies nausea or vomiting. He states he has intermittent diarrhea. He reports fevers of around 100. He reports no drainage, or redness around incisions.    He underwent CT scan which showed fluid collection approximately 5 x 3 x 7.5 cm in size consistent with abscess. He was told to come into the hospital for IV abx and possible IR drainage

## 2018-05-25 NOTE — ASSESSMENT & PLAN NOTE
44 yo M s/p lap appe 5/13 now with intra-abdominal abscess on CT    IR eval pending  NPO  Ceftriaxone, flagyl   Diet after drainage

## 2018-05-25 NOTE — PROGRESS NOTES
Pt arrived to  for abscess drain.  Name verified using two identifiers.  Allergies verified.  Will continue to monitor.

## 2018-05-25 NOTE — PLAN OF CARE
Patient lives in a 1 story house w/spouse & 2 kids. Spouse is at BS. Not medically stable for discharge;IR consulted. Needs TBD.     [facility] given to patient after informed about it;patient verbalized their understanding.        05/25/18 1210   Discharge Assessment   Assessment Type Discharge Planning Assessment   Confirmed/corrected address and phone number on facesheet? Yes   Assessment information obtained from? Patient;Medical Record;Other  (Spouse)   Expected Length of Stay (days) (2-3)   Communicated expected length of stay with patient/caregiver yes   Prior to hospitilization cognitive status: Alert/Oriented;No Deficits   Prior to hospitalization functional status: Independent   Current cognitive status: Alert/Oriented;No Deficits   Current Functional Status: Independent   Facility Arrived From: (N/A)   Lives With spouse;child(kavita), dependent  (2 kids)   Able to Return to Prior Arrangements yes   Is patient able to care for self after discharge? Yes   Who are your caregiver(s) and their phone number(s)? (Anais Ross Spouse [phone]   )   Patient's perception of discharge disposition home or selfcare   Readmission Within The Last 30 Days other (see comments)  (Recent s/p lap appendectomy)   Patient currently being followed by outpatient case management? No   Patient currently receives any other outside agency services? No   Equipment Currently Used at Home none   Do you have any problems affording any of your prescribed medications? No   Is the patient taking medications as prescribed? yes   Does the patient have transportation home? Yes   Transportation Available car;family or friend will provide   Dialysis Name and Scheduled days (N/A)   Does the patient receive services at the [facility]? No   Discharge Plan A Home with family   Discharge Plan B Home with family;Home Health  (? HH for ? drain care per Dr. Morataya states)   Patient/Family In Agreement With Plan unable  to assess

## 2018-05-25 NOTE — CONSULTS
Radiology Consult    Gautam Ross is a 43 y.o. male with a history of perforated appendicitis and right abdominal abscess on recent CT. Imaging reviewed. IR consulted for abscess drainage.    History reviewed. No pertinent past medical history.     Past Surgical History:   Procedure Laterality Date    LASIK Bilateral     UMBILICAL HERNIA REPAIR  10/16/13    open repair without mesh for 1cm defect[place] [facility] [place]       Discussed with primary team, Dr. Schoen.    Imaging reviewed with Radiology staff, Dr. Harrison.     Procedure: Abdominal abscess drainage    Scheduled Meds:    cefTRIAXone (ROCEPHIN) IVPB  2 g Intravenous Q24H    metronidazole  500 mg Intravenous Q8H     Continuous Infusions:    lactated ringers 125 mL/hr at 05/24/18 1816     PRN Meds:acetaminophen, diphenhydrAMINE, ondansetron, oxyCODONE-acetaminophen, oxyCODONE-acetaminophen    Allergies: Review of patient's allergies indicates:  No Known Allergies    Labs:  No results for input(s): INR in the last 168 hours.    Invalid input(s):  PT,  PTT    Recent Labs  Lab 05/25/18  0438   WBC 13.21*   HGB 11.9*   HCT 37.9*   MCV 84         Recent Labs  Lab 05/25/18  0438   *      K 4.4      CO2 28   BUN 16   CREATININE 1.1   CALCIUM 8.9   MG 2.3   ALT 29   AST 17   ALBUMIN 2.7*   BILITOT 0.5         Vitals (Most Recent):  Temp: 99.1 °F (37.3 °C) (05/25/18 0400)  Pulse: 87 (05/25/18 0400)  Resp: 18 (05/25/18 0400)  BP: 106/62 (05/25/18 0400)  SpO2: 95 % (05/25/18 0400)    Plan:   1. NPO continuet.  2. Hold anticoagulants.  3. Patient scheduled for image guided right abdominal abscess drainage.  4. INR pending.    Arsalan Herzog MD  PGY-5  Department of Radiology  [phone]

## 2018-05-25 NOTE — H&P
[facility]  General Surgery  History & Physical    Patient Name: Gautam Ross  MRN: [id number]  Admission Date: 5/24/2018  Attending Physician: Arsalan Carmona MD   Primary Care Provider: Weston Kwong Jr, MD (Inactive)    Patient information was obtained from patient and past medical records.     Subjective:     Chief Complaint/Reason for Admission: abscess    History of Present Illness: Gautam Ross is a 43 y.o. male status post-laparoscopic appendectomy on 5/13/18. He was feeling better after surgery, but has since started feeling worse. He is still having abdominal pain, only trying to take tylenol for the pain. He is not eating well, not like he used to. He denies nausea or vomiting. He states he has intermittent diarrhea. He reports fevers of around 100. He reports no drainage, or redness around incisions.    He underwent CT scan which showed fluid collection approximately 5 x 3 x 7.5 cm in size consistent with abscess. He was told to come into the hospital for IV abx and possible IR drainage    Current Facility-Administered Medications on File Prior to Encounter   Medication    [COMPLETED] omnipaque 350 iohexol 75 mL    omnipaque oral solution 30 mL     Current Outpatient Prescriptions on File Prior to Encounter   Medication Sig    fluticasone (FLONASE) 50 mcg/actuation nasal spray 1 spray by Each Nare route once daily. Each nostril daily    oxyCODONE-acetaminophen (PERCOCET) 5-325 mg per tablet Take 1-2 tablets by mouth every 4 to 6 hours as needed for Pain.    valacyclovir (VALTREX) 1000 MG tablet Take 1 tablet (1,000 mg total) by mouth 3 (three) times daily.       Review of patient's allergies indicates:  No Known Allergies    History reviewed. No pertinent past medical history.  Past Surgical History:   Procedure Laterality Date    LASIK Bilateral     UMBILICAL HERNIA REPAIR  10/16/13    open repair without mesh for 1cm defect; Dr. Rosales, [facility] [place]      Family History     Problem Relation (Age of Onset)    Allergies Son    Arthritis Mother    Cancer Father    Cataracts Father    Diabetes Father        Social History Main Topics    Smoking status: Former Smoker     Quit date: 8/24/2002    Smokeless tobacco: Not on file    Alcohol use No    Drug use: No    Sexual activity: Not on file     Review of Systems   Constitutional: Positive for appetite change and fever.   HENT: Negative.    Eyes: Negative.    Respiratory: Negative.    Cardiovascular: Negative.    Gastrointestinal: Positive for abdominal pain.   Endocrine: Negative.    Genitourinary: Negative.    Musculoskeletal: Negative.    Allergic/Immunologic: Negative.    Neurological: Negative.      Objective:     Vital Signs (Most Recent):  Temp: (!) 100.7 °F (38.2 °C) (05/24/18 1721)  Pulse: 94 (05/24/18 1721)  Resp: 18 (05/24/18 1721)  BP: 116/68 (05/24/18 1721)  SpO2: 95 % (05/24/18 1721) Vital Signs (24h Range):  Temp:  [100.7 °F (38.2 °C)] 100.7 °F (38.2 °C)  Pulse:  [94] 94  Resp:  [18] 18  SpO2:  [95 %] 95 %  BP: (116)/(68) 116/68     Weight: 79.9 kg (176 lb 3.2 oz)  Body mass index is 26.79 kg/m².    Physical Exam   Constitutional: He is oriented to person, place, and time. He appears well-developed and well-nourished.   HENT:   Head: Normocephalic and atraumatic.   Eyes: Conjunctivae and EOM are normal. No scleral icterus.   Neck: Normal range of motion. Neck supple. No tracheal deviation present.   Cardiovascular: Normal rate and intact distal pulses.    Pulmonary/Chest: Effort normal. No respiratory distress.   Abdominal: Soft. He exhibits no distension and no mass. There is tenderness (minimal TTP in RLQ).    incisions - clean, dry and intact, no signs or symptoms of infection appreciated   Musculoskeletal: Normal range of motion. He exhibits no edema or deformity.   Neurological: He is alert and oriented to person, place, and time.   Skin: Skin is warm and dry. No erythema.   Psychiatric: He has  a normal mood and affect. His behavior is normal. Thought content normal.   Nursing note and vitals reviewed.    Significant Labs:  CBC:   Recent Labs  Lab 05/23/18  1603   WBC 13.48*   RBC 4.77   HGB 12.9*   HCT 39.8*      MCV 83   MCH 27.0   MCHC 32.4     CMP: No results for input(s): GLU, CALCIUM, ALBUMIN, PROT, NA, K, CO2, CL, BUN, CREATININE, ALKPHOS, ALT, AST, BILITOT in the last 168 hours.    Significant Diagnostics:  I have reviewed and interpreted all pertinent imaging results/findings within the past 24 hours.    Assessment/Plan:     Intra-abdominal abscess    42 yo M s/p lap appe 5/13 now with intra-abdominal abscess on CT    Admit to [facility]  Start rocephin/flagyl  Regular diet now, NPO at MN  IR consult for drain  MIVF          VTE Risk Mitigation         Ordered     IP VTE LOW RISK PATIENT  Once      05/24/18 1731     Place VIVIEN hose  Until discontinued      05/24/18 1732     Place sequential compression device  Until discontinued      05/24/18 1731          Stuart Araiza MD  General Surgery  [facility]

## 2018-05-25 NOTE — ASSESSMENT & PLAN NOTE
42 yo M s/p lap appe 5/13 now with intra-abdominal abscess on CT    Admit to [facility]  Start rocephin/flagyl  Regular diet now, NPO at MN  IR consult for drain  MIVF

## 2018-05-25 NOTE — PROCEDURES
Radiology Post-Procedure Note    Pre Op Diagnosis: right abdominal abscess, perforated appendicitis    Post Op Diagnosis: right abdominal abscess    Procedure: right abdominal abscess drainage    Procedure performed by: Lindsey CLARK, Mino Calle    Written Informed Consent Obtained: Yes    Specimen Removed: YES 3 mL foul smelling purulent/bloody fluid    Estimated Blood Loss: Minimal    Findings: CT was used for localization of abnormal fluid collection. A needle was inserted into the fluid collection and purulent fluid was aspirated.  A wire was inserted into the collection and the tract was dilated.  A 8.0 Spanish all-purpose drainage catheter was inserted and a pigtail loop of the distal end was formed.  The catheter was sutured into place and approximately  3 mL fluid was removed initially for C/S.  Catheter then connected to suction bulb.     A specimen was sent to the lab for further analysis and culture.    The patient tolerated procedure well and there were no complications. Please see procedure report under Imaging for further details.    Luc Portillo MD  Staff Radiologist  Department of Radiology  Pager: [phone]

## 2018-05-25 NOTE — PROGRESS NOTES
[facility]  General Surgery  Progress Note    Subjective:     History of Present Illness:  Gautam Ross is a 43 y.o. male status post-laparoscopic appendectomy on 5/13/18. He was feeling better after surgery, but has since started feeling worse. He is still having abdominal pain, only trying to take tylenol for the pain. He is not eating well, not like he used to. He denies nausea or vomiting. He states he has intermittent diarrhea. He reports fevers of around 100. He reports no drainage, or redness around incisions.    He underwent CT scan which showed fluid collection approximately 5 x 3 x 7.5 cm in size consistent with abscess. He was told to come into the hospital for IV abx and possible IR drainage    Post-Op Info:  * No surgery found *         Interval History: No acute events overnight.  NPO pending IR evaluation.    Medications:  Continuous Infusions:   lactated ringers 125 mL/hr at 05/24/18 1816     Scheduled Meds:   cefTRIAXone (ROCEPHIN) IVPB  2 g Intravenous Q24H    metronidazole  500 mg Intravenous Q8H     PRN Meds:acetaminophen, diphenhydrAMINE, ondansetron, oxyCODONE-acetaminophen, oxyCODONE-acetaminophen     Review of patient's allergies indicates:  No Known Allergies  Objective:     Vital Signs (Most Recent):  Temp: 99.1 °F (37.3 °C) (05/25/18 0400)  Pulse: 87 (05/25/18 0400)  Resp: 18 (05/25/18 0400)  BP: 106/62 (05/25/18 0400)  SpO2: 95 % (05/25/18 0400) Vital Signs (24h Range):  Temp:  [99.1 °F (37.3 °C)-100.7 °F (38.2 °C)] 99.1 °F (37.3 °C)  Pulse:  [87-98] 87  Resp:  [18] 18  SpO2:  [95 %-97 %] 95 %  BP: (106-117)/(59-68) 106/62     Weight: 79.9 kg (176 lb 3.2 oz)  Body mass index is 26.79 kg/m².    Intake/Output - Last 3 Shifts     None          Physical Exam    Significant Labs:    Recent Labs      05/25/18   0438   WBC  13.21*   HGB  11.9*   HCT  37.9*   PLT  342     Recent Labs      05/25/18   0438   NA  139   K  4.4   CL  104   CO2  28   BUN  16    CREATININE  1.1   PROT  6.9   ALBUMIN  2.7*   BILITOT  0.5   AST  17   ALKPHOS  91   ALT  29           Significant Diagnostics:  Impression       Findings consistent with development of an abscess within the right lower abdomen in this patient recently s/p appendectomy.  This suspected abscess measures 5.3 x 3.3 x 7.5 cm in size.    Small amount of free fluid within the deep pelvis anterior to the rectum.    Mild atelectatic changes within the lung bases.    This report was flagged in Epic as abnormal.      Electronically signed by: Jordy Raymond MD  Date: 05/24/2018  Time: 11:14         Assessment/Plan:     Intra-abdominal abscess    42 yo M s/p lap appe 5/13 now with intra-abdominal abscess on CT    IR eval pending  NPO  Ceftriaxone, flagyl   Diet after drainage            Jonathan Schoen, MD  General Surgery  [facility]

## 2018-05-25 NOTE — SUBJECTIVE & OBJECTIVE
Interval History: No acute events overnight.  NPO pending IR evaluation.    Medications:  Continuous Infusions:   lactated ringers 125 mL/hr at 05/24/18 1816     Scheduled Meds:   cefTRIAXone (ROCEPHIN) IVPB  2 g Intravenous Q24H    metronidazole  500 mg Intravenous Q8H     PRN Meds:acetaminophen, diphenhydrAMINE, ondansetron, oxyCODONE-acetaminophen, oxyCODONE-acetaminophen     Review of patient's allergies indicates:  No Known Allergies  Objective:     Vital Signs (Most Recent):  Temp: 99.1 °F (37.3 °C) (05/25/18 0400)  Pulse: 87 (05/25/18 0400)  Resp: 18 (05/25/18 0400)  BP: 106/62 (05/25/18 0400)  SpO2: 95 % (05/25/18 0400) Vital Signs (24h Range):  Temp:  [99.1 °F (37.3 °C)-100.7 °F (38.2 °C)] 99.1 °F (37.3 °C)  Pulse:  [87-98] 87  Resp:  [18] 18  SpO2:  [95 %-97 %] 95 %  BP: (106-117)/(59-68) 106/62     Weight: 79.9 kg (176 lb 3.2 oz)  Body mass index is 26.79 kg/m².    Intake/Output - Last 3 Shifts     None          Physical Exam    Significant Labs:    Recent Labs      05/25/18   0438   WBC  13.21*   HGB  11.9*   HCT  37.9*   PLT  342     Recent Labs      05/25/18   0438   NA  139   K  4.4   CL  104   CO2  28   BUN  16   CREATININE  1.1   PROT  6.9   ALBUMIN  2.7*   BILITOT  0.5   AST  17   ALKPHOS  91   ALT  29           Significant Diagnostics:  Impression       Findings consistent with development of an abscess within the right lower abdomen in this patient recently s/p appendectomy.  This suspected abscess measures 5.3 x 3.3 x 7.5 cm in size.    Small amount of free fluid within the deep pelvis anterior to the rectum.    Mild atelectatic changes within the lung bases.    This report was flagged in Epic as abnormal.      Electronically signed by: Jordy Raymond MD  Date: 05/24/2018  Time: 11:14

## 2018-05-25 NOTE — NURSING TRANSFER
Nursing Transfer Note      5/25/2018     Transfer To: [address]A    Transfer via stretcher    Transfer with IV pump    Transported by transport team    Chart send with patient: Yes    Notified: wife    Upon arrival to floor: vital signs

## 2018-05-25 NOTE — PROGRESS NOTES
right lower quadrant drain placement. procedure complete. Patient tolerated well, no acute signs of distress noted. VSS. Dressing clean, dry and intact. Patient ready for transfer to [facility].

## 2018-05-26 PROBLEM — K65.1 INTRA-ABDOMINAL ABSCESS: Status: RESOLVED | Noted: 2018-05-24 | Resolved: 2018-05-26

## 2018-05-26 LAB
ALBUMIN SERPL BCP-MCNC: 2.4 G/DL
ALP SERPL-CCNC: 67 U/L
ALT SERPL W/O P-5'-P-CCNC: 17 U/L
ANION GAP SERPL CALC-SCNC: 8 MMOL/L
AST SERPL-CCNC: 11 U/L
BASOPHILS # BLD AUTO: 0.04 K/UL
BASOPHILS NFR BLD: 0.7 %
BILIRUB SERPL-MCNC: 0.4 MG/DL
BILIRUB UR QL STRIP: NEGATIVE
BUN SERPL-MCNC: 13 MG/DL
CALCIUM SERPL-MCNC: 8.4 MG/DL
CHLORIDE SERPL-SCNC: 102 MMOL/L
CLARITY UR REFRACT.AUTO: CLEAR
CO2 SERPL-SCNC: 28 MMOL/L
COLOR UR AUTO: YELLOW
CREAT SERPL-MCNC: 1 MG/DL
DIFFERENTIAL METHOD: ABNORMAL
EOSINOPHIL # BLD AUTO: 0.3 K/UL
EOSINOPHIL NFR BLD: 5.3 %
ERYTHROCYTE [DISTWIDTH] IN BLOOD BY AUTOMATED COUNT: 13.2 %
EST. GFR  (AFRICAN AMERICAN): >60 ML/MIN/1.73 M^2
EST. GFR  (NON AFRICAN AMERICAN): >60 ML/MIN/1.73 M^2
GLUCOSE SERPL-MCNC: 112 MG/DL
GLUCOSE UR QL STRIP: NEGATIVE
GRAM STN SPEC: NORMAL
HCT VFR BLD AUTO: 33.4 %
HGB BLD-MCNC: 11 G/DL
HGB UR QL STRIP: NEGATIVE
IMM GRANULOCYTES # BLD AUTO: 0.02 K/UL
IMM GRANULOCYTES NFR BLD AUTO: 0.4 %
KETONES UR QL STRIP: NEGATIVE
LEUKOCYTE ESTERASE UR QL STRIP: ABNORMAL
LYMPHOCYTES # BLD AUTO: 1.1 K/UL
LYMPHOCYTES NFR BLD: 19.1 %
MAGNESIUM SERPL-MCNC: 2.1 MG/DL
MCH RBC QN AUTO: 27.1 PG
MCHC RBC AUTO-ENTMCNC: 32.9 G/DL
MCV RBC AUTO: 82 FL
MICROSCOPIC COMMENT: NORMAL
MONOCYTES # BLD AUTO: 0.5 K/UL
MONOCYTES NFR BLD: 8.5 %
NEUTROPHILS # BLD AUTO: 3.7 K/UL
NEUTROPHILS NFR BLD: 66 %
NITRITE UR QL STRIP: NEGATIVE
NRBC BLD-RTO: 0 /100 WBC
PH UR STRIP: 6 [PH] (ref 5–8)
PHOSPHATE SERPL-MCNC: 2.8 MG/DL
PLATELET # BLD AUTO: 313 K/UL
PMV BLD AUTO: 9.9 FL
POTASSIUM SERPL-SCNC: 3.7 MMOL/L
PROT SERPL-MCNC: 6.2 G/DL
PROT UR QL STRIP: NEGATIVE
RBC # BLD AUTO: 4.06 M/UL
RBC #/AREA URNS AUTO: 1 /HPF (ref 0–4)
SODIUM SERPL-SCNC: 138 MMOL/L
SP GR UR STRIP: 1.01 (ref 1–1.03)
URN SPEC COLLECT METH UR: ABNORMAL
UROBILINOGEN UR STRIP-ACNC: 4 EU/DL
WBC # BLD AUTO: 5.66 K/UL
WBC #/AREA URNS AUTO: 1 /HPF (ref 0–5)

## 2018-05-26 PROCEDURE — 80053 COMPREHEN METABOLIC PANEL: CPT

## 2018-05-26 PROCEDURE — 25000003 PHARM REV CODE 250: Performed by: STUDENT IN AN ORGANIZED HEALTH CARE EDUCATION/TRAINING PROGRAM

## 2018-05-26 PROCEDURE — 11000001 HC ACUTE MED/SURG PRIVATE ROOM

## 2018-05-26 PROCEDURE — 63600175 PHARM REV CODE 636 W HCPCS: Performed by: STUDENT IN AN ORGANIZED HEALTH CARE EDUCATION/TRAINING PROGRAM

## 2018-05-26 PROCEDURE — 85025 COMPLETE CBC W/AUTO DIFF WBC: CPT

## 2018-05-26 PROCEDURE — S0030 INJECTION, METRONIDAZOLE: HCPCS | Performed by: PHYSICIAN ASSISTANT

## 2018-05-26 PROCEDURE — 25000003 PHARM REV CODE 250: Performed by: PHYSICIAN ASSISTANT

## 2018-05-26 PROCEDURE — 84100 ASSAY OF PHOSPHORUS: CPT

## 2018-05-26 PROCEDURE — S0030 INJECTION, METRONIDAZOLE: HCPCS | Performed by: STUDENT IN AN ORGANIZED HEALTH CARE EDUCATION/TRAINING PROGRAM

## 2018-05-26 PROCEDURE — 36415 COLL VENOUS BLD VENIPUNCTURE: CPT

## 2018-05-26 PROCEDURE — 83735 ASSAY OF MAGNESIUM: CPT

## 2018-05-26 PROCEDURE — 81001 URINALYSIS AUTO W/SCOPE: CPT

## 2018-05-26 RX ORDER — METRONIDAZOLE 500 MG/100ML
500 INJECTION, SOLUTION INTRAVENOUS
Status: DISCONTINUED | OUTPATIENT
Start: 2018-05-26 | End: 2018-05-28

## 2018-05-26 RX ORDER — METRONIDAZOLE 500 MG/1
500 TABLET ORAL EVERY 8 HOURS
Status: DISCONTINUED | OUTPATIENT
Start: 2018-05-26 | End: 2018-05-26

## 2018-05-26 RX ORDER — METRONIDAZOLE 500 MG/1
500 TABLET ORAL EVERY 8 HOURS
Qty: 42 TABLET | Refills: 0 | OUTPATIENT
Start: 2018-05-26 | End: 2018-05-26

## 2018-05-26 RX ORDER — METRONIDAZOLE 500 MG/1
500 TABLET ORAL EVERY 8 HOURS
Qty: 42 TABLET | Refills: 0 | Status: SHIPPED | OUTPATIENT
Start: 2018-05-26 | End: 2018-05-28 | Stop reason: HOSPADM

## 2018-05-26 RX ORDER — CIPROFLOXACIN 500 MG/1
500 TABLET ORAL EVERY 12 HOURS
Qty: 28 TABLET | Refills: 0 | Status: SHIPPED | OUTPATIENT
Start: 2018-05-26 | End: 2018-05-28 | Stop reason: HOSPADM

## 2018-05-26 RX ORDER — CIPROFLOXACIN 500 MG/1
500 TABLET ORAL EVERY 12 HOURS
Qty: 28 TABLET | Refills: 0 | OUTPATIENT
Start: 2018-05-26 | End: 2018-05-26

## 2018-05-26 RX ORDER — OXYCODONE AND ACETAMINOPHEN 5; 325 MG/1; MG/1
1 TABLET ORAL EVERY 4 HOURS PRN
Qty: 12 TABLET | Refills: 0 | Status: SHIPPED | OUTPATIENT
Start: 2018-05-26 | End: 2018-06-07

## 2018-05-26 RX ORDER — ACETAMINOPHEN 500 MG
1000 TABLET ORAL EVERY 8 HOURS PRN
Status: DISCONTINUED | OUTPATIENT
Start: 2018-05-26 | End: 2018-05-28 | Stop reason: HOSPADM

## 2018-05-26 RX ORDER — CIPROFLOXACIN 500 MG/1
500 TABLET ORAL EVERY 12 HOURS
Status: DISCONTINUED | OUTPATIENT
Start: 2018-05-26 | End: 2018-05-26

## 2018-05-26 RX ORDER — OXYCODONE AND ACETAMINOPHEN 5; 325 MG/1; MG/1
1 TABLET ORAL EVERY 4 HOURS PRN
Qty: 12 TABLET | Refills: 0 | Status: SHIPPED | OUTPATIENT
Start: 2018-05-26 | End: 2018-05-26

## 2018-05-26 RX ADMIN — CEFTRIAXONE SODIUM 2 G: 2 INJECTION, POWDER, FOR SOLUTION INTRAMUSCULAR; INTRAVENOUS at 11:05

## 2018-05-26 RX ADMIN — METRONIDAZOLE 500 MG: 500 INJECTION, SOLUTION INTRAVENOUS at 03:05

## 2018-05-26 RX ADMIN — CIPROFLOXACIN HYDROCHLORIDE 500 MG: 500 TABLET, FILM COATED ORAL at 09:05

## 2018-05-26 RX ADMIN — METRONIDAZOLE 500 MG: 500 INJECTION, SOLUTION INTRAVENOUS at 10:05

## 2018-05-26 RX ADMIN — METRONIDAZOLE 500 MG: 500 TABLET ORAL at 03:05

## 2018-05-26 RX ADMIN — OXYCODONE HYDROCHLORIDE AND ACETAMINOPHEN 1 TABLET: 5; 325 TABLET ORAL at 07:05

## 2018-05-26 RX ADMIN — ACETAMINOPHEN 650 MG: 325 TABLET, FILM COATED ORAL at 03:05

## 2018-05-26 NOTE — NURSING
Assisted patient to bathroom to clean up vomit off himself. Wife with patient in the bathroom assisting him with a wipe down. Emptied patient's drain. Changed his linens and gown. Assisted patient back to bed. Wife at the bedside

## 2018-05-26 NOTE — NURSING
Patient got back to room from IR at this time. Patient complaining of feeling shaky. Went to assess the patient and his temp was slightly elevated. Patient threw up about 4 times. Emesis consisted of orange juice he was given in PACU. Administered Zofran to patient.

## 2018-05-26 NOTE — DISCHARGE INSTRUCTIONS
Discharge instructions reviewed with patient and spouse. #22 saline lock removed from right hand with no difficulty. All questions and concerns addressed.

## 2018-05-26 NOTE — PLAN OF CARE
Problem: Patient Care Overview  Goal: Plan of Care Review  Patient free of falls and pressure injuries. Patient ambulates well and is steady. Patient states he has zero pain since the IR drain placement.

## 2018-05-26 NOTE — DISCHARGE SUMMARY
[facility]  General Surgery  Discharge Summary      Patient Name: Gautam Ross  MRN: [id number]  Admission Date: 5/24/2018  Hospital Length of Stay: 3 days  Discharge Date and Time:  05/27/2018 7:21 AM  Attending Physician: Arsalan Carmona MD   Discharging Provider: Radha Davis MD  Primary Care Provider: Primary Doctor No     HPI: Gautam Ross is a 43 y.o. male status post-laparoscopic appendectomy on 5/13/18. He was feeling better after surgery, but has since started feeling worse. He is still having abdominal pain, only trying to take tylenol for the pain. He is not eating well, not like he used to. He denies nausea or vomiting. He states he has intermittent diarrhea. He reports fevers of around 100. He reports no drainage, or redness around incisions.     He underwent CT scan which showed fluid collection approximately 5 x 3 x 7.5 cm in size consistent with abscess. He was told to come into the hospital for IV abx and possible IR drainage    Hospital Course: Gautam Ross underwent drain placement per interventional radiology on 5/26/18 as treatment for intra-abdominal abscess. He tolerated the procedure well and his post-op course was uncomplicated. Prior to discharge home on 05/27/2018 his pain was well controlled on oral medications, tolerated diet, ambulated, spontaneously voided and experienced return of bowel function. He was discharged home in good condition on POD#2.      Consults:   Consults         Status Ordering Provider     Inpatient consult to Interventional Radiology  Once     Provider:  (Not yet assigned)    Completed SCHOEN, JONATHAN          Significant Diagnostic Studies: Labs:   CMP     Recent Labs  Lab 05/26/18  1906 05/27/18  0457    138   K 3.7 4.1    103   CO2 28 28   * 101   BUN 13 11   CREATININE 1.0 0.9   CALCIUM 8.4* 8.6*   PROT 6.2 6.3   ALBUMIN 2.4* 2.5*   BILITOT 0.4 0.3   ALKPHOS 67 78   AST 11 15   ALT 17 15    ANIONGAP 8 7*   ESTGFRAFRICA >60.0 >60.0   EGFRNONAA >60.0 >60.0    and CBC     Recent Labs  Lab 05/26/18  1906 05/27/18  0457   WBC 5.66 5.47   HGB 11.0* 11.6*   HCT 33.4* 35.8*    329     Physical exam:  General: NAD  Neuro: AAOx4  Cardio: S1 and S2, RRR  Resp: Moving air appropriately, breathing even and unlabored  Abd: Soft, NT, ND, drain in place in right side of abdomen with thick cloudy/serosagnuinous output  Ext: Warm and well perfused    Pending Diagnostic Studies:     Procedure Component Value Units Date/Time    IR Abscess Drainage Appendiceal [[id number]]     Order Status:  Sent Lab Status:  No result         Final Active Diagnoses:    Diagnosis Date Noted POA      Problems Resolved During this Admission:    Diagnosis Date Noted Date Resolved POA    PRINCIPAL PROBLEM:  Intra-abdominal abscess [K65.1] 05/24/2018 05/26/2018 Yes      Discharged Condition: good    Disposition: Home or Self Care    Follow Up:  Follow-up Information     Arsalan Carmona MD In 1 week.    Specialty:  General Surgery  Why:  Drain check  Contact information:  [address]  [place] [address]  [phone]                 Patient Instructions:     Diet Adult Regular     Activity as tolerated     Shower on day dressing removed (No bath)   Order Comments: Ok to shower, keep drain site covered     No driving until:   Order Comments: Do not drive while taking pain medications.     Notify your health care provider if you experience any of the following:  temperature >100.4     Notify your health care provider if you experience any of the following:  persistent nausea and vomiting or diarrhea     Notify your health care provider if you experience any of the following:  severe uncontrolled pain     Notify your health care provider if you experience any of the following:  redness, tenderness, or signs of infection (pain, swelling, redness, odor or green/yellow discharge around incision site)     Change dressing (specify)  "  Order Comments: Change dressing around drain daily or as needed if saturated. Strip and record drain output daily. Bring log with you to clinic visit. Keep bulb "charged" (to suction).       Medications:  Reconciled Home Medications:      Medication List      START taking these medications    ciprofloxacin HCl 500 MG tablet  Commonly known as:  CIPRO  Take 1 tablet (500 mg total) by mouth every 12 (twelve) hours.     metroNIDAZOLE 500 MG tablet  Commonly known as:  FLAGYL  Take 1 tablet (500 mg total) by mouth every 8 (eight) hours.        CHANGE how you take these medications    * oxyCODONE-acetaminophen 5-325 mg per tablet  Commonly known as:  PERCOCET  Take 1-2 tablets by mouth every 4 to 6 hours as needed for Pain.  What changed:  Another medication with the same name was added. Make sure you understand how and when to take each.     * oxyCODONE-acetaminophen 5-325 mg per tablet  Commonly known as:  PERCOCET  Take 1 tablet by mouth every 4 (four) hours as needed for Pain (Do not drive while taking pain medications).  What changed:  You were already taking a medication with the same name, and this prescription was added. Make sure you understand how and when to take each.        * This list has 2 medication(s) that are the same as other medications prescribed for you. Read the directions carefully, and ask your doctor or other care provider to review them with you.            CONTINUE taking these medications    fluticasone 50 mcg/actuation nasal spray  Commonly known as:  FLONASE  1 spray by Each Nare route once daily. Each nostril daily     valACYclovir 1000 MG tablet  Commonly known as:  VALTREX  Take 1 tablet (1,000 mg total) by mouth 3 (three) times daily.            Radha Davis MD  General Surgery  [facility]  "

## 2018-05-26 NOTE — NURSING
Spoke to  patient discharge vitals taken T=101.3 oral and 99.6 axillary. Tylenol given with oral antibiotic. Will recheck in 30-45 minutes. Will follow up with MD at that time.

## 2018-05-26 NOTE — PLAN OF CARE
Problem: Patient Care Overview  Goal: Plan of Care Review  Outcome: Ongoing (interventions implemented as appropriate)  Fall Risk: Encouraged patient to continue to call for staff assistance with all transfers. Explained to patient due to his recent change in medical condition, he is at increased risk for falls. Verbalized understanding. Will maintain safety precautions and follow up as needed.

## 2018-05-26 NOTE — NURSING
T=101.9. Notified  and she stated to cancel discharge and she would come and see patient. Will update patient and spouse and maintain safety precautions.

## 2018-05-27 PROBLEM — T81.43XA POSTPROCEDURAL INTRAABDOMINAL ABSCESS: Status: ACTIVE | Noted: 2018-05-24

## 2018-05-27 LAB
ALBUMIN SERPL BCP-MCNC: 2.5 G/DL
ALP SERPL-CCNC: 78 U/L
ALT SERPL W/O P-5'-P-CCNC: 15 U/L
ANION GAP SERPL CALC-SCNC: 7 MMOL/L
AST SERPL-CCNC: 15 U/L
BASOPHILS # BLD AUTO: 0.04 K/UL
BASOPHILS NFR BLD: 0.7 %
BILIRUB SERPL-MCNC: 0.3 MG/DL
BUN SERPL-MCNC: 11 MG/DL
CALCIUM SERPL-MCNC: 8.6 MG/DL
CHLORIDE SERPL-SCNC: 103 MMOL/L
CO2 SERPL-SCNC: 28 MMOL/L
CREAT SERPL-MCNC: 0.9 MG/DL
DIFFERENTIAL METHOD: ABNORMAL
EOSINOPHIL # BLD AUTO: 0.3 K/UL
EOSINOPHIL NFR BLD: 5.3 %
ERYTHROCYTE [DISTWIDTH] IN BLOOD BY AUTOMATED COUNT: 13.2 %
EST. GFR  (AFRICAN AMERICAN): >60 ML/MIN/1.73 M^2
EST. GFR  (NON AFRICAN AMERICAN): >60 ML/MIN/1.73 M^2
GLUCOSE SERPL-MCNC: 101 MG/DL
HCT VFR BLD AUTO: 35.8 %
HGB BLD-MCNC: 11.6 G/DL
IMM GRANULOCYTES # BLD AUTO: 0.02 K/UL
IMM GRANULOCYTES NFR BLD AUTO: 0.4 %
LYMPHOCYTES # BLD AUTO: 0.8 K/UL
LYMPHOCYTES NFR BLD: 15.4 %
MAGNESIUM SERPL-MCNC: 2.2 MG/DL
MCH RBC QN AUTO: 27 PG
MCHC RBC AUTO-ENTMCNC: 32.4 G/DL
MCV RBC AUTO: 83 FL
MONOCYTES # BLD AUTO: 0.6 K/UL
MONOCYTES NFR BLD: 11.3 %
NEUTROPHILS # BLD AUTO: 3.7 K/UL
NEUTROPHILS NFR BLD: 66.9 %
NRBC BLD-RTO: 0 /100 WBC
PHOSPHATE SERPL-MCNC: 3.2 MG/DL
PLATELET # BLD AUTO: 329 K/UL
PMV BLD AUTO: 10.2 FL
POTASSIUM SERPL-SCNC: 4.1 MMOL/L
PROT SERPL-MCNC: 6.3 G/DL
RBC # BLD AUTO: 4.3 M/UL
SODIUM SERPL-SCNC: 138 MMOL/L
WBC # BLD AUTO: 5.47 K/UL

## 2018-05-27 PROCEDURE — 25000003 PHARM REV CODE 250: Performed by: PHYSICIAN ASSISTANT

## 2018-05-27 PROCEDURE — 85025 COMPLETE CBC W/AUTO DIFF WBC: CPT

## 2018-05-27 PROCEDURE — 63600175 PHARM REV CODE 636 W HCPCS: Performed by: STUDENT IN AN ORGANIZED HEALTH CARE EDUCATION/TRAINING PROGRAM

## 2018-05-27 PROCEDURE — 25500020 PHARM REV CODE 255: Performed by: SURGERY

## 2018-05-27 PROCEDURE — 25000003 PHARM REV CODE 250: Performed by: STUDENT IN AN ORGANIZED HEALTH CARE EDUCATION/TRAINING PROGRAM

## 2018-05-27 PROCEDURE — 36415 COLL VENOUS BLD VENIPUNCTURE: CPT

## 2018-05-27 PROCEDURE — 84100 ASSAY OF PHOSPHORUS: CPT

## 2018-05-27 PROCEDURE — 25500020 PHARM REV CODE 255: Performed by: STUDENT IN AN ORGANIZED HEALTH CARE EDUCATION/TRAINING PROGRAM

## 2018-05-27 PROCEDURE — S0030 INJECTION, METRONIDAZOLE: HCPCS | Performed by: STUDENT IN AN ORGANIZED HEALTH CARE EDUCATION/TRAINING PROGRAM

## 2018-05-27 PROCEDURE — 11000001 HC ACUTE MED/SURG PRIVATE ROOM

## 2018-05-27 PROCEDURE — 80053 COMPREHEN METABOLIC PANEL: CPT

## 2018-05-27 PROCEDURE — 83735 ASSAY OF MAGNESIUM: CPT

## 2018-05-27 RX ADMIN — IOHEXOL 15 ML: 350 INJECTION, SOLUTION INTRAVENOUS at 10:05

## 2018-05-27 RX ADMIN — OXYCODONE HYDROCHLORIDE AND ACETAMINOPHEN 1 TABLET: 5; 325 TABLET ORAL at 03:05

## 2018-05-27 RX ADMIN — CEFTRIAXONE SODIUM 2 G: 2 INJECTION, POWDER, FOR SOLUTION INTRAMUSCULAR; INTRAVENOUS at 10:05

## 2018-05-27 RX ADMIN — METRONIDAZOLE 500 MG: 500 INJECTION, SOLUTION INTRAVENOUS at 02:05

## 2018-05-27 RX ADMIN — METRONIDAZOLE 500 MG: 500 INJECTION, SOLUTION INTRAVENOUS at 10:05

## 2018-05-27 RX ADMIN — METRONIDAZOLE 500 MG: 500 INJECTION, SOLUTION INTRAVENOUS at 06:05

## 2018-05-27 RX ADMIN — SODIUM CHLORIDE, SODIUM LACTATE, POTASSIUM CHLORIDE, AND CALCIUM CHLORIDE: .6; .31; .03; .02 INJECTION, SOLUTION INTRAVENOUS at 10:05

## 2018-05-27 RX ADMIN — IOHEXOL 100 ML: 350 INJECTION, SOLUTION INTRAVENOUS at 12:05

## 2018-05-27 NOTE — PLAN OF CARE
Problem: Patient Care Overview  Goal: Plan of Care Review  Outcome: Ongoing (interventions implemented as appropriate)  Pt. VSS, pt. Remained afebrile overnight, pain is well managed with current regimen, pt. Reports pain is mostly only present with activity, will continue to monitor.

## 2018-05-27 NOTE — PROGRESS NOTES
[facility]  General Surgery  Progress Note    Subjective:     History of Present Illness:  Gautam Ross is a 43 y.o. male status post-laparoscopic appendectomy on 5/13/18. He was feeling better after surgery, but has since started feeling worse. He is still having abdominal pain, only trying to take tylenol for the pain. He is not eating well, not like he used to. He denies nausea or vomiting. He states he has intermittent diarrhea. He reports fevers of around 100. He reports no drainage, or redness around incisions.    He underwent CT scan which showed fluid collection approximately 5 x 3 x 7.5 cm in size consistent with abscess. He was told to come into the hospital for IV abx and possible IR drainage    Post-Op Info:  * No surgery found *         Interval History: Febrile yesterday during the day, no fevers overnight. Placed back on IV ceftriaxone and flagyl. Drain now with thick, purulent output. Patient states he clinically feels better. No abdominal pain    Medications:  Continuous Infusions:   lactated ringers 125 mL/hr at 05/26/18 0600     Scheduled Meds:   cefTRIAXone (ROCEPHIN) IVPB  2 g Intravenous Q24H    metronidazole  500 mg Intravenous Q8H     PRN Meds:acetaminophen, diphenhydrAMINE, ondansetron, oxyCODONE-acetaminophen, oxyCODONE-acetaminophen     Review of patient's allergies indicates:  No Known Allergies  Objective:     Vital Signs (Most Recent):  Temp: 98.7 °F (37.1 °C) (05/26/18 2338)  Pulse: 77 (05/26/18 2338)  Resp: 18 (05/26/18 2338)  BP: (!) 99/55 (05/26/18 2338)  SpO2: (!) 93 % (05/26/18 2338) Vital Signs (24h Range):  Temp:  [98.7 °F (37.1 °C)-101.9 °F (38.8 °C)] 98.7 °F (37.1 °C)  Pulse:  [74-89] 77  Resp:  [18] 18  SpO2:  [93 %-97 %] 93 %  BP: ()/(51-70) 99/55     Weight: 79.9 kg (176 lb 3.2 oz)  Body mass index is 26.79 kg/m².    Intake/Output - Last 3 Shifts       05/25 0700 - 05/26 0659 05/26 0700 - 05/27 0659 05/27 0700 - 05/28 0659    P.O.  180      I.V. (mL/kg) 1900 (23.8)      Total Intake(mL/kg) 1900 (23.8) 180 (2.3)     Drains 200 50     Total Output 200 50      Net +1700 +130             Urine Occurrence 2 x 5 x           Physical Exam   Constitutional: He is oriented to person, place, and time. He appears well-developed and well-nourished.   HENT:   Head: Normocephalic and atraumatic.   Cardiovascular: Normal rate.    Pulmonary/Chest: Effort normal.   Abdominal: Soft. He exhibits no distension. There is no tenderness.   Drain in place in RLQ with thick purulent output   Neurological: He is alert and oriented to person, place, and time.   Skin: Skin is warm and dry.   Nursing note and vitals reviewed.      Significant Labs:    Recent Labs      05/27/18   0457   WBC  5.47   HGB  11.6*   HCT  35.8*   PLT  329     Recent Labs      05/27/18 0457   NA  138   K  4.1   CL  103   CO2  28   BUN  11   CREATININE  0.9   PROT  6.3   ALBUMIN  2.5*   BILITOT  0.3   AST  15   ALKPHOS  78   ALT  15           Significant Diagnostics:  Impression       Findings consistent with development of an abscess within the right lower abdomen in this patient recently s/p appendectomy.  This suspected abscess measures 5.3 x 3.3 x 7.5 cm in size.    Small amount of free fluid within the deep pelvis anterior to the rectum.    Mild atelectatic changes within the lung bases.    This report was flagged in Epic as abnormal.      Electronically signed by: Jordy Raymond MD  Date: 05/24/2018  Time: 11:14         Assessment/Plan:     * Postprocedural intraabdominal abscess    42yo male s/p lap appe (5/13) now with intraabdominal abscess s/p IR drainage with drain placement on 5/25    - febrile during the day yesterday, no fevers overnight but now with thick purulent, possibly feculant discharge from drain  - WBC within normal limits, placed back on IV ceftriaxone and flagyl  - will repeat CT scan given change in drain output appearance  - tolerating regular diet  - monitor Is and Os               Radha Davis MD  General Surgery  [facility]

## 2018-05-27 NOTE — ASSESSMENT & PLAN NOTE
44yo male s/p lap appy now with intraabdominal abscess s/p IR drainage with drain placement on 5/25    - febrile during the day yesterday, no fevers overnight but now with thick purulent discharge from drain  - WBC within normal limits, placed back on IV ceftriaxone and flagyl  - will consider repeat CT scan given change in drain output appearance, will discuss with staff  - tolerating regular diet  - monitor Is and Os  - possible discharge later today pending discussion with staff but will most likely keep overnight given drain appearance change

## 2018-05-27 NOTE — SUBJECTIVE & OBJECTIVE
Interval History: Febrile yesterday during the day, no fevers overnight. Placed back on IV ceftriaxone. Drain now with thick, purulent output. Patient states he clinically feels better. No abdominal pain    Medications:  Continuous Infusions:   lactated ringers 125 mL/hr at 05/26/18 0600     Scheduled Meds:   cefTRIAXone (ROCEPHIN) IVPB  2 g Intravenous Q24H    metronidazole  500 mg Intravenous Q8H     PRN Meds:acetaminophen, diphenhydrAMINE, ondansetron, oxyCODONE-acetaminophen, oxyCODONE-acetaminophen     Review of patient's allergies indicates:  No Known Allergies  Objective:     Vital Signs (Most Recent):  Temp: 98.7 °F (37.1 °C) (05/26/18 2338)  Pulse: 77 (05/26/18 2338)  Resp: 18 (05/26/18 2338)  BP: (!) 99/55 (05/26/18 2338)  SpO2: (!) 93 % (05/26/18 2338) Vital Signs (24h Range):  Temp:  [98.7 °F (37.1 °C)-101.9 °F (38.8 °C)] 98.7 °F (37.1 °C)  Pulse:  [74-89] 77  Resp:  [18] 18  SpO2:  [93 %-97 %] 93 %  BP: ()/(51-70) 99/55     Weight: 79.9 kg (176 lb 3.2 oz)  Body mass index is 26.79 kg/m².    Intake/Output - Last 3 Shifts       05/25 0700 - 05/26 0659 05/26 0700 - 05/27 0659 05/27 0700 - 05/28 0659    P.O.  180     I.V. (mL/kg) 1900 (23.8)      Total Intake(mL/kg) 1900 (23.8) 180 (2.3)     Drains 200 50     Total Output 200 50      Net +1700 +130             Urine Occurrence 2 x 5 x           Physical Exam   Constitutional: He is oriented to person, place, and time. He appears well-developed and well-nourished.   HENT:   Head: Normocephalic and atraumatic.   Cardiovascular: Normal rate.    Pulmonary/Chest: Effort normal.   Abdominal: Soft. He exhibits no distension. There is no tenderness.   Drain in place in RLQ with thick purulent output   Neurological: He is alert and oriented to person, place, and time.   Skin: Skin is warm and dry.   Nursing note and vitals reviewed.      Significant Labs:    Recent Labs      05/27/18   0457   WBC  5.47   HGB  11.6*   HCT  35.8*   PLT  329     Recent Labs       05/27/18   0457   NA  138   K  4.1   CL  103   CO2  28   BUN  11   CREATININE  0.9   PROT  6.3   ALBUMIN  2.5*   BILITOT  0.3   AST  15   ALKPHOS  78   ALT  15           Significant Diagnostics:  Impression       Findings consistent with development of an abscess within the right lower abdomen in this patient recently s/p appendectomy.  This suspected abscess measures 5.3 x 3.3 x 7.5 cm in size.    Small amount of free fluid within the deep pelvis anterior to the rectum.    Mild atelectatic changes within the lung bases.    This report was flagged in Epic as abnormal.      Electronically signed by: Jordy Raymond MD  Date: 05/24/2018  Time: 11:14

## 2018-05-27 NOTE — ASSESSMENT & PLAN NOTE
44yo male s/p lap appe (5/13) now with intraabdominal abscess s/p IR drainage with drain placement on 5/25    - febrile during the day yesterday, no fevers overnight but now with thick purulent discharge from drain  - WBC within normal limits, placed back on IV ceftriaxone and flagyl  - will consider repeat CT scan given change in drain output appearance, will discuss with staff  - tolerating regular diet  - monitor Is and Os  - possible discharge later today pending discussion with staff but will most likely keep overnight given drain appearance change

## 2018-05-28 VITALS
HEIGHT: 68 IN | SYSTOLIC BLOOD PRESSURE: 104 MMHG | WEIGHT: 176.19 LBS | OXYGEN SATURATION: 96 % | TEMPERATURE: 98 F | BODY MASS INDEX: 26.7 KG/M2 | DIASTOLIC BLOOD PRESSURE: 58 MMHG | HEART RATE: 77 BPM | RESPIRATION RATE: 18 BRPM

## 2018-05-28 LAB
ANION GAP SERPL CALC-SCNC: 8 MMOL/L
BACTERIA SPEC AEROBE CULT: NORMAL
BASOPHILS # BLD AUTO: 0.03 K/UL
BASOPHILS NFR BLD: 0.6 %
BUN SERPL-MCNC: 9 MG/DL
CALCIUM SERPL-MCNC: 8.7 MG/DL
CHLORIDE SERPL-SCNC: 105 MMOL/L
CO2 SERPL-SCNC: 25 MMOL/L
CREAT SERPL-MCNC: 0.9 MG/DL
DIFFERENTIAL METHOD: ABNORMAL
EOSINOPHIL # BLD AUTO: 0.2 K/UL
EOSINOPHIL NFR BLD: 4.7 %
ERYTHROCYTE [DISTWIDTH] IN BLOOD BY AUTOMATED COUNT: 13.2 %
EST. GFR  (AFRICAN AMERICAN): >60 ML/MIN/1.73 M^2
EST. GFR  (NON AFRICAN AMERICAN): >60 ML/MIN/1.73 M^2
GLUCOSE SERPL-MCNC: 98 MG/DL
HCT VFR BLD AUTO: 37.9 %
HGB BLD-MCNC: 11.9 G/DL
IMM GRANULOCYTES # BLD AUTO: 0.02 K/UL
IMM GRANULOCYTES NFR BLD AUTO: 0.4 %
LYMPHOCYTES # BLD AUTO: 1 K/UL
LYMPHOCYTES NFR BLD: 20.1 %
MAGNESIUM SERPL-MCNC: 2.2 MG/DL
MCH RBC QN AUTO: 25.7 PG
MCHC RBC AUTO-ENTMCNC: 31.4 G/DL
MCV RBC AUTO: 82 FL
MONOCYTES # BLD AUTO: 0.4 K/UL
MONOCYTES NFR BLD: 8.9 %
NEUTROPHILS # BLD AUTO: 3.2 K/UL
NEUTROPHILS NFR BLD: 65.3 %
NRBC BLD-RTO: 0 /100 WBC
PLATELET # BLD AUTO: 381 K/UL
PMV BLD AUTO: 9.7 FL
POTASSIUM SERPL-SCNC: 4.3 MMOL/L
RBC # BLD AUTO: 4.63 M/UL
SODIUM SERPL-SCNC: 138 MMOL/L
WBC # BLD AUTO: 4.93 K/UL

## 2018-05-28 PROCEDURE — 25000003 PHARM REV CODE 250: Performed by: PHYSICIAN ASSISTANT

## 2018-05-28 PROCEDURE — 25000003 PHARM REV CODE 250: Performed by: STUDENT IN AN ORGANIZED HEALTH CARE EDUCATION/TRAINING PROGRAM

## 2018-05-28 PROCEDURE — 83735 ASSAY OF MAGNESIUM: CPT

## 2018-05-28 PROCEDURE — 36415 COLL VENOUS BLD VENIPUNCTURE: CPT

## 2018-05-28 PROCEDURE — S0030 INJECTION, METRONIDAZOLE: HCPCS | Performed by: STUDENT IN AN ORGANIZED HEALTH CARE EDUCATION/TRAINING PROGRAM

## 2018-05-28 PROCEDURE — 80048 BASIC METABOLIC PNL TOTAL CA: CPT

## 2018-05-28 PROCEDURE — 85025 COMPLETE CBC W/AUTO DIFF WBC: CPT

## 2018-05-28 RX ORDER — METRONIDAZOLE 500 MG/1
500 TABLET ORAL EVERY 8 HOURS
Status: DISCONTINUED | OUTPATIENT
Start: 2018-05-28 | End: 2018-05-28 | Stop reason: HOSPADM

## 2018-05-28 RX ORDER — METRONIDAZOLE 500 MG/1
500 TABLET ORAL EVERY 8 HOURS
Qty: 42 TABLET | Refills: 0 | Status: SHIPPED | OUTPATIENT
Start: 2018-05-28 | End: 2018-06-11

## 2018-05-28 RX ORDER — ENOXAPARIN SODIUM 100 MG/ML
40 INJECTION SUBCUTANEOUS EVERY 24 HOURS
Status: DISCONTINUED | OUTPATIENT
Start: 2018-05-28 | End: 2018-05-28 | Stop reason: HOSPADM

## 2018-05-28 RX ORDER — CIPROFLOXACIN 500 MG/1
500 TABLET ORAL EVERY 12 HOURS
Qty: 28 TABLET | Refills: 0 | Status: SHIPPED | OUTPATIENT
Start: 2018-05-28 | End: 2018-06-11

## 2018-05-28 RX ORDER — CIPROFLOXACIN 500 MG/1
500 TABLET ORAL EVERY 12 HOURS
Status: DISCONTINUED | OUTPATIENT
Start: 2018-05-28 | End: 2018-05-28 | Stop reason: HOSPADM

## 2018-05-28 RX ADMIN — METRONIDAZOLE 500 MG: 500 TABLET ORAL at 02:05

## 2018-05-28 RX ADMIN — CIPROFLOXACIN 500 MG: 500 TABLET, FILM COATED ORAL at 11:05

## 2018-05-28 RX ADMIN — SODIUM CHLORIDE, SODIUM LACTATE, POTASSIUM CHLORIDE, AND CALCIUM CHLORIDE: .6; .31; .03; .02 INJECTION, SOLUTION INTRAVENOUS at 09:05

## 2018-05-28 RX ADMIN — METRONIDAZOLE 500 MG: 500 INJECTION, SOLUTION INTRAVENOUS at 06:05

## 2018-05-28 NOTE — DISCHARGE SUMMARY
[facility]  Discharge Summary  General Surgery      Admit Date: 5/24/2018    Discharge Date and Time:  05/28/2018 3:19 PM    Attending Physician: Arsalan Carmona MD     Discharge Provider: Jonathan Schoen    Reason for Admission: Post appendectomy abscess, drain placement    Procedures Performed: * No surgery found *    Hospital Course (synopsis of major diagnoses, care, treatment, and services provided during the course of the hospital stay): Patient was admitted for post-appendectomy abscess, IR drain was placed and subsequently the patient developed fever and feculent-appearing drainage from his DIXIE drain.  CT scan was obtained demonstrating no fistula/leak, and drain output ceased. The patient was discharged in good condition with the below medications and below follow-up.       Consults: interventional radiology    Significant Diagnostic Studies: CT per record    Final Diagnoses:   Principal Problem: Postprocedural intraabdominal abscess   Secondary Diagnoses:   Active Hospital Problems    Diagnosis  POA    *Postprocedural intraabdominal abscess [T81.4XXA]  Yes      Resolved Hospital Problems    Diagnosis Date Resolved POA   No resolved problems to display.       Discharged Condition: good    Disposition: Home or Self Care    Follow Up/Patient Instructions:     Medications:  Reconciled Home Medications:      Medication List      START taking these medications    ciprofloxacin HCl 500 MG tablet  Commonly known as:  CIPRO  Take 1 tablet (500 mg total) by mouth every 12 (twelve) hours.     metroNIDAZOLE 500 MG tablet  Commonly known as:  FLAGYL  Take 1 tablet (500 mg total) by mouth every 8 (eight) hours.        CHANGE how you take these medications    * oxyCODONE-acetaminophen 5-325 mg per tablet  Commonly known as:  PERCOCET  Take 1-2 tablets by mouth every 4 to 6 hours as needed for Pain.  What changed:  Another medication with the same name was added. Make sure you understand how and  when to take each.     * oxyCODONE-acetaminophen 5-325 mg per tablet  Commonly known as:  PERCOCET  Take 1 tablet by mouth every 4 (four) hours as needed for Pain (Do not drive while taking pain medications).  What changed:  You were already taking a medication with the same name, and this prescription was added. Make sure you understand how and when to take each.        * This list has 2 medication(s) that are the same as other medications prescribed for you. Read the directions carefully, and ask your doctor or other care provider to review them with you.            CONTINUE taking these medications    fluticasone 50 mcg/actuation nasal spray  Commonly known as:  FLONASE  1 spray by Each Nare route once daily. Each nostril daily     valACYclovir 1000 MG tablet  Commonly known as:  VALTREX  Take 1 tablet (1,000 mg total) by mouth 3 (three) times daily.            Discharge Procedure Orders  CT Abdomen Pelvis With Contrast   Standing Status: Future  Standing Exp. Date: 05/28/19   Order Specific Question Answer Comments   Is the patient allergic to iodine or contrast? Has a steroid / antihistamine prep been administered? No    Is the patient on ANY Metformin drug such as Glugophage/Glucovance?           Should be off drug 48 hours after contrast. Check renal function before restart. No    History of Kidney Disease - including: decreased kidney function, dialysis, kidney transplay, single kidney, kidney cancer, kidney surgery? None    Does the patient have high blood pressure requiring medical treatment? No    Diabetes? No    May the Radiologist modify the order per protocol to meet the clinical needs of the patient? Yes    Oral/Rectal Contrast instructions: Routine Oral Contrast    Special CT ABD Protocol Request? Routine      Diet Adult Regular     Activity as tolerated     Shower on day dressing removed (No bath)   Order Comments: Ok to shower, keep drain site covered     No driving until:   Order Comments: Do not  "drive while taking pain medications.     Notify your health care provider if you experience any of the following:  temperature >100.4     Notify your health care provider if you experience any of the following:  persistent nausea and vomiting or diarrhea     Notify your health care provider if you experience any of the following:  severe uncontrolled pain     Notify your health care provider if you experience any of the following:  redness, tenderness, or signs of infection (pain, swelling, redness, odor or green/yellow discharge around incision site)     Change dressing (specify)   Order Comments: Change dressing around drain daily or as needed if saturated. Strip and record drain output daily. Bring log with you to clinic visit. Keep bulb "charged" (to suction).     Call MD for:  temperature >100.4     Call MD for:  persistent nausea and vomiting or diarrhea     Call MD for:  severe uncontrolled pain     Call MD for:  redness, tenderness, or signs of infection (pain, swelling, redness, odor or green/yellow discharge around incision site)     Call MD for:  difficulty breathing or increased cough     Call MD for:  severe persistent headache     Call MD for:  worsening rash     Call MD for:  persistent dizziness, light-headedness, or visual disturbances     Call MD for:  increased confusion or weakness     Call MD for:   Order Comments: Other concerns       Follow-up Information     Arsalan Carmona MD In 1 week.    Specialty:  General Surgery  Why:  Drain check  Contact information:  [address]  [facility] [id number] [phone]                 "

## 2018-05-28 NOTE — PLAN OF CARE
Problem: Infection, Risk/Actual (Adult)  Intervention: Prevent Infection/Maximize Resistance  Optimize fluids and nutrition control.

## 2018-05-28 NOTE — ASSESSMENT & PLAN NOTE
44yo male s/p lap appe (5/13) now with intraabdominal abscess s/p IR drainage with drain placement on 5/25    - WBC within normal limits, likely transition to PO antibiotics for discharge home  - tolerating regular diet  - monitor Is and Os  - possible discharge later today

## 2018-05-28 NOTE — SUBJECTIVE & OBJECTIVE
Interval History:   NAEON. Drain output consistent. CT scan shows decreased size of abscess.     Medications:  Continuous Infusions:   lactated ringers 125 mL/hr at 05/28/18 0929     Scheduled Meds:   cefTRIAXone (ROCEPHIN) IVPB  2 g Intravenous Q24H    enoxaparin  40 mg Subcutaneous Daily    metronidazole  500 mg Intravenous Q8H     PRN Meds:acetaminophen, diphenhydrAMINE, omnipaque, ondansetron, oxyCODONE-acetaminophen, oxyCODONE-acetaminophen     Review of patient's allergies indicates:  No Known Allergies  Objective:     Vital Signs (Most Recent):  Temp: 97.9 °F (36.6 °C) (05/28/18 0917)  Pulse: 79 (05/28/18 0917)  Resp: 18 (05/28/18 0917)  BP: 105/63 (05/28/18 0917)  SpO2: 96 % (05/28/18 0917) Vital Signs (24h Range):  Temp:  [96.8 °F (36 °C)-98.3 °F (36.8 °C)] 97.9 °F (36.6 °C)  Pulse:  [73-80] 79  Resp:  [17-18] 18  SpO2:  [94 %-97 %] 96 %  BP: (105-118)/(59-71) 105/63     Weight: 79.9 kg (176 lb 3.2 oz)  Body mass index is 26.79 kg/m².    Intake/Output - Last 3 Shifts       05/26 0700 - 05/27 0659 05/27 0700 - 05/28 0659 05/28 0700 - 05/29 0659    P.O. 180      I.V. (mL/kg)  1250 (15.6)     Total Intake(mL/kg) 180 (2.3) 1250 (15.6)     Drains 50 20     Total Output 50 20      Net +130 +1230             Urine Occurrence 5 x 3 x     Stool Occurrence  1 x     Emesis Occurrence  0 x           Physical Exam   Constitutional: He is oriented to person, place, and time. He appears well-developed and well-nourished. No distress.   Cardiovascular: Normal rate and regular rhythm.    Pulmonary/Chest: Effort normal. No respiratory distress.   Abdominal: Soft. He exhibits no distension and no mass. There is no tenderness.   Drain with green/brown output   Neurological: He is alert and oriented to person, place, and time.   Skin: Skin is warm and dry.   Psychiatric: He has a normal mood and affect. His behavior is normal.       Significant Labs:  CBC:   Recent Labs  Lab 05/28/18  0840   WBC 4.93   RBC 4.63   HGB 11.9*    HCT 37.9*   *   MCV 82   MCH 25.7*   MCHC 31.4*     BMP:   Recent Labs  Lab 05/28/18  0840   GLU 98      K 4.3      CO2 25   BUN 9   CREATININE 0.9   CALCIUM 8.7   MG 2.2

## 2018-05-28 NOTE — PROGRESS NOTES
[facility]  General Surgery  Progress Note    Subjective:     History of Present Illness:  Gautam Ross is a 43 y.o. male status post-laparoscopic appendectomy on 5/13/18. He was feeling better after surgery, but has since started feeling worse. He is still having abdominal pain, only trying to take tylenol for the pain. He is not eating well, not like he used to. He denies nausea or vomiting. He states he has intermittent diarrhea. He reports fevers of around 100. He reports no drainage, or redness around incisions.    He underwent CT scan which showed fluid collection approximately 5 x 3 x 7.5 cm in size consistent with abscess. He was told to come into the hospital for IV abx and possible IR drainage    Post-Op Info:  * No surgery found *         Interval History:   NAEON. Drain output consistent. CT scan shows decreased size of abscess.     Medications:  Continuous Infusions:   lactated ringers 125 mL/hr at 05/28/18 0929     Scheduled Meds:   cefTRIAXone (ROCEPHIN) IVPB  2 g Intravenous Q24H    enoxaparin  40 mg Subcutaneous Daily    metronidazole  500 mg Intravenous Q8H     PRN Meds:acetaminophen, diphenhydrAMINE, omnipaque, ondansetron, oxyCODONE-acetaminophen, oxyCODONE-acetaminophen     Review of patient's allergies indicates:  No Known Allergies  Objective:     Vital Signs (Most Recent):  Temp: 97.9 °F (36.6 °C) (05/28/18 0917)  Pulse: 79 (05/28/18 0917)  Resp: 18 (05/28/18 0917)  BP: 105/63 (05/28/18 0917)  SpO2: 96 % (05/28/18 0917) Vital Signs (24h Range):  Temp:  [96.8 °F (36 °C)-98.3 °F (36.8 °C)] 97.9 °F (36.6 °C)  Pulse:  [73-80] 79  Resp:  [17-18] 18  SpO2:  [94 %-97 %] 96 %  BP: (105-118)/(59-71) 105/63     Weight: 79.9 kg (176 lb 3.2 oz)  Body mass index is 26.79 kg/m².    Intake/Output - Last 3 Shifts       05/26 0700 - 05/27 0659 05/27 0700 - 05/28 0659 05/28 0700 - 05/29 0659    P.O. 180      I.V. (mL/kg)  1250 (15.6)     Total Intake(mL/kg) 180 (2.3) 1250  (15.6)     Drains 50 20     Total Output 50 20      Net +130 +1230             Urine Occurrence 5 x 3 x     Stool Occurrence  1 x     Emesis Occurrence  0 x           Physical Exam   Constitutional: He is oriented to person, place, and time. He appears well-developed and well-nourished. No distress.   Cardiovascular: Normal rate and regular rhythm.    Pulmonary/Chest: Effort normal. No respiratory distress.   Abdominal: Soft. He exhibits no distension and no mass. There is no tenderness.   Drain with green/brown output   Neurological: He is alert and oriented to person, place, and time.   Skin: Skin is warm and dry.   Psychiatric: He has a normal mood and affect. His behavior is normal.       Significant Labs:  CBC:   Recent Labs  Lab 05/28/18  0840   WBC 4.93   RBC 4.63   HGB 11.9*   HCT 37.9*   *   MCV 82   MCH 25.7*   MCHC 31.4*     BMP:   Recent Labs  Lab 05/28/18  0840   GLU 98      K 4.3      CO2 25   BUN 9   CREATININE 0.9   CALCIUM 8.7   MG 2.2         Assessment/Plan:     * Postprocedural intraabdominal abscess    44yo male s/p lap appe (5/13) now with intraabdominal abscess s/p IR drainage with drain placement on 5/25    - WBC within normal limits, likely transition to PO antibiotics for discharge home  - tolerating regular diet  - monitor Is and Os  - possible discharge later today             Francy Barba MD  General Surgery  [facility]

## 2018-05-28 NOTE — PROGRESS NOTES
Pt wanted to know the hold up on discharge orders. Dr. Barba notified and verbally stated talk to pt during evening rounds.

## 2018-05-28 NOTE — PROGRESS NOTES
Discharge Note    Pt alert and oriented X4 with zero complaints of pain. Pt and wife verbalized understanding of discharge teachings as well as demonstrated draining grenade bulb. Grenade bulb drained empty with dressing dry, clean, and intact. Pt  ambulated without assist upon discharge.

## 2018-05-28 NOTE — PLAN OF CARE
Problem: Infection, Risk/Actual (Adult)  Intervention: Manage Suspected/Actual Infection  Monitor for signs of early sepsis. Maintain aseptic technique.

## 2018-05-29 NOTE — PLAN OF CARE
Plan is to discharge home with grenade drain.     05/29/18 0751   Final Note   Assessment Type Final Discharge Note   Discharge Disposition Home   Right Care Referral Info   Post Acute Recommendation No Care

## 2018-05-30 ENCOUNTER — PATIENT MESSAGE (OUTPATIENT)
Dept: SURGERY | Facility: CLINIC | Age: 43
End: 2018-05-30

## 2018-05-31 LAB
BACTERIA SPEC ANAEROBE CULT: NORMAL
BACTERIA SPEC ANAEROBE CULT: NORMAL

## 2018-06-07 ENCOUNTER — OFFICE VISIT (OUTPATIENT)
Dept: SURGERY | Facility: CLINIC | Age: 43
End: 2018-06-07
Payer: COMMERCIAL

## 2018-06-07 VITALS
HEART RATE: 94 BPM | DIASTOLIC BLOOD PRESSURE: 69 MMHG | SYSTOLIC BLOOD PRESSURE: 115 MMHG | HEIGHT: 68 IN | WEIGHT: 182 LBS | BODY MASS INDEX: 27.58 KG/M2

## 2018-06-07 DIAGNOSIS — Z90.49 S/P LAPAROSCOPIC APPENDECTOMY: Primary | ICD-10-CM

## 2018-06-07 PROCEDURE — 99999 PR PBB SHADOW E&M-EST. PATIENT-LVL I: CPT | Mod: PBBFAC,,, | Performed by: PHYSICIAN ASSISTANT

## 2018-06-07 PROCEDURE — 99024 POSTOP FOLLOW-UP VISIT: CPT | Mod: S$GLB,,, | Performed by: PHYSICIAN ASSISTANT

## 2018-06-07 NOTE — PROGRESS NOTES
SUBJECTIVE:  The patient is a 43 y.o. y/o male 3 weeks s/p laparoscopic appendectomy. Shortly after surgery, he developed fevers, chills, abdominal pain. He was admitted and CT scan was repeated, which showed intra-abdominal fluid collection. IR was consulted and drain was placed. Drain has been putting out <20mL/day. He is still on antibiotics and has about 5 days left. He currently denies pain, fevers, chills, nausea, vomiting, diarrhea, or constipation. Eating well with normal appetite and bowel function. Denies redness around or drainage from incisions.    OBJECTIVE:  GEN: male in NAD  ABD: soft, non-tender, non-distended  INCISIONS: clean, dry and intact, healing well, IR drain with serosang drainage    ASSESSMENT/PLAN:  Doing better 3 weeks s/p laparoscopic appendectomy. Patient is advised to avoid heavy lifting or strenuous activity for another 2-4 weeks. May resume light cardio. Patient may bathe and continue to take a regular diet. Drain removed in clinic, patient tolerated it well. Finish abx to completion. Will follow-up with me on an as-needed basis. All questions answered; patient is comfortable with follow-up plan.

## 2018-07-10 ENCOUNTER — PATIENT MESSAGE (OUTPATIENT)
Dept: RESEARCH | Facility: HOSPITAL | Age: 43
End: 2018-07-10

## 2018-08-02 ENCOUNTER — OFFICE VISIT (OUTPATIENT)
Dept: OPTOMETRY | Facility: CLINIC | Age: 43
End: 2018-08-02
Payer: COMMERCIAL

## 2018-08-02 ENCOUNTER — TELEPHONE (OUTPATIENT)
Dept: OPTOMETRY | Facility: CLINIC | Age: 43
End: 2018-08-02

## 2018-08-02 DIAGNOSIS — H52.203 MYOPIA OF BOTH EYES WITH ASTIGMATISM AND PRESBYOPIA: Primary | ICD-10-CM

## 2018-08-02 DIAGNOSIS — Z46.0 FITTING AND ADJUSTMENT OF SPECTACLES AND CONTACT LENSES: Primary | ICD-10-CM

## 2018-08-02 DIAGNOSIS — Z98.890 HX OF LASIK: ICD-10-CM

## 2018-08-02 DIAGNOSIS — H52.13 MYOPIA OF BOTH EYES WITH ASTIGMATISM AND PRESBYOPIA: Primary | ICD-10-CM

## 2018-08-02 DIAGNOSIS — H52.4 MYOPIA OF BOTH EYES WITH ASTIGMATISM AND PRESBYOPIA: Primary | ICD-10-CM

## 2018-08-02 PROCEDURE — 99999 PR PBB SHADOW E&M-EST. PATIENT-LVL II: CPT | Mod: PBBFAC,,, | Performed by: OPTOMETRIST

## 2018-08-02 PROCEDURE — 92015 DETERMINE REFRACTIVE STATE: CPT | Mod: S$GLB,,, | Performed by: OPTOMETRIST

## 2018-08-02 PROCEDURE — 92310 CONTACT LENS FITTING OU: CPT | Mod: ,,, | Performed by: OPTOMETRIST

## 2018-08-02 PROCEDURE — 92014 COMPRE OPH EXAM EST PT 1/>: CPT | Mod: S$GLB,,, | Performed by: OPTOMETRIST

## 2018-08-02 NOTE — PROGRESS NOTES
HPI     Contact Lens Fit    Additional comments: 1 yr chk           Comments   Patient states his va seems to be doing well with his current rx for   glasses and CL. He would like an update on the rx for both of them.    No gtts  Hx LASIK OU  Wears ONE DAY AV MOIST ASTIG OU       Last edited by Kolby Camacho, OD on 8/2/2018  8:31 AM. (History)        ROS     Positive for: Eyes (LASIK OU)    Negative for: Constitutional, Gastrointestinal, Neurological, Skin,   Genitourinary, Musculoskeletal, HENT, Endocrine, Cardiovascular,   Respiratory, Psychiatric, Allergic/Imm, Heme/Lymph    Last edited by Kolby Camacho, OD on 8/2/2018  8:31 AM. (History)        Assessment /Plan     For exam results, see Encounter Report.    Myopia of both eyes with astigmatism and presbyopia    Hx of LASIK      1. Residual myopia/astig/presby sp LASIK 15 years ago.  Pt wearing ONE DAY AV MOIST ASTIG, but needs inc cyl power OS.  Discussed presby, monovision options, OR keeping in present CLs w reduced minus to help at near.  Pt wishes full distance correction, and will wear otc readers when he needs them in the future.  Also discussed scleral lens/RGP options for best vision, but pt wishes to stay in SCLs    PLAN:    1. Sent pt to [facility] to order TRIAL CLs in [place] he needs.  I do NOT need to see at disp  2. Cont DW sched--exchange nightly  3. If no problems will call for CLRx, rtc 1 yr

## 2018-08-07 ENCOUNTER — OFFICE VISIT (OUTPATIENT)
Dept: INTERNAL MEDICINE | Facility: CLINIC | Age: 43
End: 2018-08-07
Payer: COMMERCIAL

## 2018-08-07 VITALS
HEIGHT: 68 IN | OXYGEN SATURATION: 93 % | DIASTOLIC BLOOD PRESSURE: 62 MMHG | TEMPERATURE: 101 F | WEIGHT: 185 LBS | HEART RATE: 95 BPM | SYSTOLIC BLOOD PRESSURE: 117 MMHG | BODY MASS INDEX: 28.04 KG/M2

## 2018-08-07 DIAGNOSIS — J02.0 STREP THROAT: Primary | ICD-10-CM

## 2018-08-07 DIAGNOSIS — J02.9 SORE THROAT: ICD-10-CM

## 2018-08-07 DIAGNOSIS — R50.9 FEVER, UNSPECIFIED FEVER CAUSE: ICD-10-CM

## 2018-08-07 LAB — DEPRECATED S PYO AG THROAT QL EIA: POSITIVE

## 2018-08-07 PROCEDURE — 99999 PR PBB SHADOW E&M-EST. PATIENT-LVL IV: CPT | Mod: PBBFAC,,, | Performed by: INTERNAL MEDICINE

## 2018-08-07 PROCEDURE — 87880 STREP A ASSAY W/OPTIC: CPT

## 2018-08-07 PROCEDURE — 3008F BODY MASS INDEX DOCD: CPT | Mod: CPTII,S$GLB,, | Performed by: INTERNAL MEDICINE

## 2018-08-07 PROCEDURE — 99214 OFFICE O/P EST MOD 30 MIN: CPT | Mod: S$GLB,,, | Performed by: INTERNAL MEDICINE

## 2018-08-07 RX ORDER — AMOXICILLIN AND CLAVULANATE POTASSIUM 875; 125 MG/1; MG/1
1 TABLET, FILM COATED ORAL 2 TIMES DAILY
Qty: 20 TABLET | Refills: 0 | Status: SHIPPED | OUTPATIENT
Start: 2018-08-07 | End: 2018-08-17

## 2018-08-07 NOTE — PATIENT INSTRUCTIONS
Strep Throat  Strep throat is a throat infection caused by a bacteria called group A Streptococcus bacteria (group A strep). The bacteria live in the nose and throat. Strep throat is contagious and spreads easily from person to person through airborne droplets when an infected person coughs, sneezes, or talks. Good hand washing is important to help prevent the spread of this illness.  Children diagnosed with strep throat should not attend school or  until they have been taking antibiotics and had no fever for 24 hours.  Strep throat mainly affects school-aged children between 5 and 15 years of age, but can affect adults too. When it isn't treated, it can lead to serious problems including rheumatic fever (an inflammation of the joints and heart) and kidney damage.    How is strep throat spread?  Strep throat can be easily spread from an infected person's saliva by:  · Drinking and eating after them  · Sharing a straw, cup, toothbrushes, and eating utensils  When to go to the emergency room (ER)  Call 911 if your child has trouble breathing or swallowing. Call your healthcare provider about other symptoms of strep throat, such as:  · Throat pain, especially when swallowing  · Red, swollen tonsils  · Swollen lymph glands  · Stomachache; sometimes, vomiting in younger children  · Pus in the back of the throat  What to expect in the ER  · Your child will be examined and the healthcare provider will ask about his or her medical history.  · The child's tonsils will be examined. A sample of fluid may be taken from the back of the throat using a soft swab. The sample can be checked right away for the bacteria that cause strep throat. Another sample may also be sent to a lab for testing.  · An antibiotic is usually prescribed to kill the bacteria. Be sure your child takes all the medicine, even if he or she starts to feel better. (Note that antibiotics will not help a viral throat infection.)  · If swallowing is  very painful, painkilling medicine may also be prescribed.  When to call your healthcare provider  Call your healthcare provider if your otherwise healthy child has finished the treatment for strep throat and has:  · Joint pain or swelling  · Shortness of breath  · Signs of dehydration (no tears when crying and not urinating for more than 8 hours)  · Ear pain or pressure  · Headaches  · Rash  · Fever (see Fever and children, below)  Fever and children  Always use a digital thermometer to check your childs temperature. Never use a mercury thermometer.  For infants and toddlers, be sure to use a rectal thermometer correctly. A rectal thermometer may accidentally poke a hole in (perforate) the rectum. It may also pass on germs from the stool. Always follow the product makers directions for proper use. If you dont feel comfortable taking a rectal temperature, use another method. When you talk to your childs healthcare provider, tell him or her which method you used to take your childs temperature.  Here are guidelines for fever temperature. Ear temperatures arent accurate before 6 months of age. Dont take an oral temperature until your child is at least 4 years old.  Infant under 3 months old:  · Ask your childs healthcare provider how you should take the temperature.  · Rectal or forehead (temporal artery) temperature of 100.4°F (38°C) or higher, or as directed by the provider  · Armpit temperature of 99°F (37.2°C) or higher, or as directed by the provider  Child age 3 to 36 months:  · Rectal, forehead (temporal artery), or ear temperature of 102°F (38.9°C) or higher, or as directed by the provider  · Armpit temperature of 101°F (38.3°C) or higher, or as directed by the provider  Child of any age:  · Repeated temperature of 104°F (40°C) or higher, or as directed by the provider  · Fever that lasts more than 24 hours in a child under 2 years old. Or a fever that lasts for 3 days in a child 2 years or older.    Easing strep throat symptoms  These tips can help ease your child's symptoms:  · Offer easy-to-swallow foods, such as soup, applesauce, popsicles, cold drinks, milk shakes, and yogurt.  · Provide a soft diet and avoid spicy or acidic foods.  · Use a cool-mist humidifier in the child's bedroom.  · Gargle with saltwater (for older children and adults only). Mix 1/4 teaspoon salt in 1 cup (8 oz) of warm water.   Date Last Reviewed: 1/1/2017  © 8479-5015 [employer]. [address] [place] [address], [place] [address]. All rights reserved. This information is not intended as a substitute for professional medical care. Always follow your healthcare professional's instructions.

## 2018-08-07 NOTE — PROGRESS NOTES
"Subjective:       Patient ID: Gautam Ross is a 43 y.o. male.    Chief Complaint: URI (symptoms such as high grade fever, lightheaded, sore throat (difficulty swallowing) and general body aches. )    HPI   One day high fever, malaise, sore throat. Fever up to 104 down to 102 with tylenol.   No sick contacts, 2 young kids with cough but no sig illness.   Appendectomy 2 mo ago. Required drain.   Review of Systems   Constitutional: Positive for fever.   HENT: Positive for sore throat.    Respiratory: Negative for shortness of breath.    Cardiovascular: Negative for chest pain.   Musculoskeletal: Negative.    Skin: Negative.        Objective:   /62 (BP Location: Left arm, Patient Position: Sitting, BP Method: Large (Manual))   Pulse 95   Temp (!) 100.5 °F (38.1 °C) (Oral)   Ht 5' 8" (1.727 m)   Wt 83.9 kg (185 lb)   SpO2 (!) 93%   BMI 28.13 kg/m²      Physical Exam   Constitutional: He is oriented to person, place, and time. He appears well-developed and well-nourished. No distress.   HENT:   Head: Normocephalic and atraumatic.   Mouth/Throat: Oropharyngeal exudate present.   Erythema of throat  Bilateral ear canals without purulence and bilateral TM with good light reflex     Cardiovascular: Normal rate and regular rhythm.    No murmur heard.  Pulmonary/Chest: Effort normal. No respiratory distress. He has no wheezes. He has no rales.   Neurological: He is alert and oriented to person, place, and time.   Skin: Skin is warm and dry. He is not diaphoretic.   Psychiatric: He has a normal mood and affect. His behavior is normal.       Assessment:       1. Strep throat    2. Fever, unspecified fever cause    3. Sore throat        Plan:       Gautam was seen today for uri.    Diagnoses and all orders for this visit:    Strep throat with Fever, sore throat, exudates, LAD  -     THROAT SCREEN, RAPID  -     amoxicillin-clavulanate 875-125mg (AUGMENTIN) 875-125 mg per tablet; Take 1 tablet by mouth 2 (two) " times daily. for 10 days    Fever, unspecified fever cause    Sore throat          CENTOR SCORE FOR STREP PREDICTABILITY    Age 3-14 +1               No    Age 15-44 no change  Yes    Age >45 +1   No    tonsilar exudate/swelling +1 Yes  1  lymph node +1  Yes  1  Fever +1   Yes  1  Absence of cough +1  Yes  1    Score 4            0=no further testing or abx  1=no further testing or abx  2=culture and treat results  3=culture and treat results  4=abx and culture

## 2018-08-07 NOTE — MEDICAL/APP STUDENT
Subjective:       Patient ID: Gautam Ross is a 43 y.o. male.    Chief Complaint: URI (symptoms such as high grade fever, lightheaded, sore throat (difficulty swallowing) and general body aches. )    HPI     Mr. Ross is a 44yo male presenting with sore throat of 1 day duration.    Initially started feeling general malaise, dizziness and body aches yesterday morning. Progressed to sore throat in the evening. Overnight had fever up to 104 and chills. Took tylenol and advil, temperature decreased to 102. Also experiencing nausea. Woke up this morning and did not feel better. Took advil this morning as well. No cough, no vomiting, no ear pain. Has 2 children at home, 5yo and 4yo, both of whom have had colds. No other sick contacts.    PMHx of appendectomy and post appendectomy abscess necessitating drain. No other pertinent PMHx.      Review of Systems   Constitutional: Positive for chills, diaphoresis, fatigue and fever.   HENT: Positive for sore throat. Negative for congestion, drooling, ear discharge, ear pain, postnasal drip and rhinorrhea.    Respiratory: Negative for cough and shortness of breath.    Cardiovascular: Negative for chest pain.   Gastrointestinal: Positive for nausea. Negative for abdominal pain, constipation and diarrhea.   Neurological: Positive for dizziness, light-headedness and headaches.       Objective:      Physical Exam   Constitutional: He appears well-developed and well-nourished.   HENT:   Head: Normocephalic and atraumatic.   Mouth/Throat: Uvula is midline. Oropharyngeal exudate and posterior oropharyngeal erythema present. No tonsillar abscesses. No tonsillar exudate.       Neck: Normal range of motion. Neck supple. No JVD present.   Cardiovascular: Normal rate, regular rhythm, normal heart sounds and intact distal pulses.    No murmur heard.  Pulmonary/Chest: Effort normal and breath sounds normal. No respiratory distress.   Abdominal: Soft. There is no tenderness.    Lymphadenopathy:     He has cervical adenopathy.   Skin: Skin is warm. He is diaphoretic. There is pallor.       Assessment:       Mr. Ross is a 42yo male presenting with sore throat and high fever of 1 day duration. According to Centor Criteria, likely Strep:    Fever- 1  Absence of cough- 1  Exudates- 1  Presence of tender anterior cervical lymphadenopathy- 1  Age- 0     Plan:       Upper Respiratory Tract Infection  - Centor- 4   - Augmentin-875 BID, 10 days  - Rapid strep test, throat swab  - RTC if sx worsening      Yunier Abbott, MS4

## 2018-08-08 ENCOUNTER — PATIENT MESSAGE (OUTPATIENT)
Dept: INTERNAL MEDICINE | Facility: CLINIC | Age: 43
End: 2018-08-08

## 2018-08-08 ENCOUNTER — TELEPHONE (OUTPATIENT)
Dept: INTERNAL MEDICINE | Facility: CLINIC | Age: 43
End: 2018-08-08

## 2018-08-08 DIAGNOSIS — J02.0 STREP THROAT: Primary | ICD-10-CM

## 2018-08-08 NOTE — TELEPHONE ENCOUNTER
Spoke with pt via portal. Pt stated that he was seen on yesterday for an urgent evaluation of sore throat. Pt mentioned that it is still severe although he is still having fever, which gets better with Advil. Pt also says that he can barely swallow fluids or foods. When he eats something, its only a little bit. Pt asks what do you think about the magic mouthwash? Can you call in a Rx for this, if at all possible?     Please adv. Thanks.

## 2018-08-08 NOTE — TELEPHONE ENCOUNTER
The antibiotic is the most important thing. Can send in magic mouthwash for symptomatic relief. If not resolving in next few days will have him see ENT

## 2018-10-02 ENCOUNTER — TELEPHONE (OUTPATIENT)
Dept: ENDOCRINOLOGY | Facility: CLINIC | Age: 43
End: 2018-10-02

## 2018-10-02 RX ORDER — HYDROCORTISONE 25 MG/G
CREAM TOPICAL 2 TIMES DAILY
Qty: 20 G | Refills: 3 | Status: SHIPPED | OUTPATIENT
Start: 2018-10-02 | End: 2023-08-05 | Stop reason: ALTCHOICE

## 2018-10-02 RX ORDER — AMOXICILLIN 500 MG/1
500 CAPSULE ORAL EVERY 12 HOURS
Qty: 14 CAPSULE | Refills: 1 | Status: SHIPPED | OUTPATIENT
Start: 2018-10-02 | End: 2023-08-05

## 2018-10-27 ENCOUNTER — OFFICE VISIT (OUTPATIENT)
Dept: INTERNAL MEDICINE | Facility: CLINIC | Age: 43
End: 2018-10-27
Payer: COMMERCIAL

## 2018-10-27 ENCOUNTER — HOSPITAL ENCOUNTER (OUTPATIENT)
Dept: RADIOLOGY | Facility: HOSPITAL | Age: 43
Discharge: HOME | End: 2018-10-27
Attending: FAMILY MEDICINE
Payer: COMMERCIAL

## 2018-10-27 VITALS
OXYGEN SATURATION: 98 % | HEART RATE: 78 BPM | BODY MASS INDEX: 28.4 KG/M2 | HEIGHT: 68 IN | DIASTOLIC BLOOD PRESSURE: 66 MMHG | SYSTOLIC BLOOD PRESSURE: 114 MMHG | WEIGHT: 187.38 LBS

## 2018-10-27 DIAGNOSIS — R10.13 ABDOMINAL PAIN, EPIGASTRIC: ICD-10-CM

## 2018-10-27 DIAGNOSIS — R10.13 ABDOMINAL PAIN, EPIGASTRIC: Primary | ICD-10-CM

## 2018-10-27 PROCEDURE — 99999 PR PBB SHADOW E&M-EST. PATIENT-LVL III: CPT | Mod: PBBFAC,,, | Performed by: FAMILY MEDICINE

## 2018-10-27 PROCEDURE — 76700 US EXAM ABDOM COMPLETE: CPT | Mod: 26,,, | Performed by: INTERNAL MEDICINE

## 2018-10-27 PROCEDURE — 76700 US EXAM ABDOM COMPLETE: CPT | Mod: TC

## 2018-10-27 PROCEDURE — 99213 OFFICE O/P EST LOW 20 MIN: CPT | Mod: S$GLB,,, | Performed by: FAMILY MEDICINE

## 2018-10-27 PROCEDURE — 3008F BODY MASS INDEX DOCD: CPT | Mod: CPTII,S$GLB,, | Performed by: FAMILY MEDICINE

## 2018-10-27 NOTE — PROGRESS NOTES
"Subjective:       Patient ID: Gautam Ross is a 43 y.o. male.    Chief Complaint: Abdominal Pain    HPI   Pt is here for 2 isolated episodes of abd pain epigastric pt had a recent appendectomy but this pain was different.  Lasted a couple of hours then completely subsided  Same thing about a week ago.  He did not seek medical attn at that time.  Review of Systems   Constitutional: Negative for chills, fatigue and fever.   Respiratory: Negative for cough, chest tightness and shortness of breath.    Cardiovascular: Negative for chest pain and palpitations.   Gastrointestinal: Negative for abdominal distention and abdominal pain.       Objective:      Physical Exam   Constitutional: He appears well-developed and well-nourished. No distress.   Cardiovascular: Normal rate and regular rhythm. Exam reveals no gallop.   Pulmonary/Chest: Effort normal and breath sounds normal. No stridor. No respiratory distress.   Abdominal: Soft. Bowel sounds are normal. He exhibits no distension. There is no tenderness.       Assessment:       1. Abdominal pain, epigastric        Plan:     orders cmp poc u abd u/s  Low fat high fiber diet     increase water intake  Graded exercise  Go to ed for abd pain  rtc prn    "This note will not be shared with the patient."   "

## 2018-11-01 ENCOUNTER — PATIENT MESSAGE (OUTPATIENT)
Dept: SURGERY | Facility: OTHER | Age: 43
End: 2018-11-01

## 2018-11-01 NOTE — H&P (VIEW-ONLY)
History & Physical    SUBJECTIVE:     History of Present Illness:  Patient is a 43 y.o. male with epigastric RUQ pain . Imaging study shows gallstones.  No fever, chills, jaundice     Review of patient's allergies indicates:  No Known Allergies    Current Outpatient Medications   Medication Sig Dispense Refill    (Magic mouthwash) 1:1:1 Benadryl 12.5mg/5ml liq, aluminum & magnesium hydroxide-simehticone (Maalox), lidocaine viscous 2% Swish and spit 10 mLs every 4 (four) hours as needed (sore throat). for mouth sores 90 mL 0    fluticasone (FLONASE) 50 mcg/actuation nasal spray 1 spray by Each Nare route once daily. Each nostril daily       No current facility-administered medications for this visit.        No past medical history on file.  Past Surgical History:   Procedure Laterality Date    APPENDECTOMY-LAPAROSCOPIC N/A 2018    [facility]    LASIK Bilateral     REPAIR, HERNIA, UMBILICAL, AGE 5 YEARS OR OLDER N/A 10/16/2013    Performed by Jelani Rosales MD at [facility] OR [facility]ND [facility]    UMBILICAL HERNIA REPAIR  10/16/13    open repair without mesh for 1cm defect; Dr. Rosales, [facility] [place]     Family History   Problem Relation Age of Onset    Arthritis Mother     Cancer Father     Diabetes Father     Cataracts Father     Allergies Son         food allergies    Amblyopia Neg Hx     Blindness Neg Hx     Glaucoma Neg Hx     Macular degeneration Neg Hx     Retinal detachment Neg Hx     Strabismus Neg Hx      Social History     Tobacco Use    Smoking status: Former Smoker     Last attempt to quit: 2002     Years since quittin.2   Substance Use Topics    Alcohol use: No    Drug use: No        Review of Systems:  Review of Systems   HENT: Negative.    Respiratory: Negative.    Cardiovascular: Negative.    Gastrointestinal: Negative.    Genitourinary: Negative.    Musculoskeletal: Negative.    Skin: Negative.        OBJECTIVE:     Vital Signs (Most  "Recent)  Pulse: 72 (11/01/18 1509)  BP: 124/73 (11/01/18 1509)  5' 8" (1.727 m)  82.6 kg (182 lb)     Physical Exam:  Physical Exam   Constitutional: He is oriented to person, place, and time. He appears well-developed and well-nourished.   HENT:   Head: Normocephalic and atraumatic.   Eyes: EOM are normal. No scleral icterus.   Neck: Normal range of motion. Neck supple.   Cardiovascular: Normal rate, regular rhythm and normal heart sounds.   Pulmonary/Chest: Effort normal and breath sounds normal.   Abdominal: Soft. Bowel sounds are normal. He exhibits no distension and no mass. There is no tenderness. There is no guarding.   Musculoskeletal: Normal range of motion.   Neurological: He is alert and oriented to person, place, and time.   Skin: Skin is warm and dry.   Psychiatric: He has a normal mood and affect. His behavior is normal.       Laboratory  Gautam Ross #[id number] (CSN: [id number]) (43 y.o. M)    Results   CBC auto differential (Acc# [id number]) (Order [id number])   CBC auto differential   Order: [id number]   Status:  Final result   Visible to patient:  Yes (Patient Portal) Next appt:  None Dx:  Abdominal pain, epigastric    Ref Range & Units 5d ago 5mo ago   WBC 3.90 - 12.70 K/uL 6.06  4.93    RBC 4.60 - 6.20 M/uL 5.35  4.63    Hemoglobin 14.0 - 18.0 g/dL 14.8  11.9 Abnormally low     Hematocrit 40.0 - 54.0 % 45.0  37.9 Abnormally low     MCV 82 - 98 fL 84  82    MCH 27.0 - 31.0 pg 27.7  25.7 Abnormally low     MCHC 32.0 - 36.0 g/dL 32.9  31.4 Abnormally low     RDW 11.5 - 14.5 % 13.7  13.2    Platelets 150 - 350 K/uL 219  381 Abnormally high     MPV 9.2 - 12.9 fL 11.4  9.7    Gran # (ANC) 1.8 - 7.7 K/uL 3.1  3.2    Lymph # 1.0 - 4.8 K/uL 2.2  1.0    Mono # 0.3 - 1.0 K/uL 0.5  0.4    Eos # 0.0 - 0.5 K/uL 0.2  0.2    Baso # 0.00 - 0.20 K/uL 0.05  0.03    nRBC 0 /100 WBC 0  0    Gran% 38.0 - 73.0 % 51.8  65.3    Lymph% 18.0 - 48.0 % 36.3  20.1    Mono% 4.0 - 15.0 % 7.8  8.9    Eosinophil% 0.0 - 8.0 " % 3.3  4.7    Basophil% 0.0 - 1.9 % 0.8  0.6    Differential Method  Automated  Automated    Immature Granulocytes   0.4    Immature Grans (Abs)   0.02 CM   Resulting Agency  [facility]         Specimen Collected: 10/27/18  1:36 PM Last Resulted: 10/27/18  6:04 PM Lab Flowsheet Order Details View Encounter Lab and Collection Details Routing Result History      CM=Additional comments           Other Results from 10/27/2018     Comprehensive metabolic panel   Order: [id number]     Status:  Final result   Visible to patient:  Yes (Patient Portal) Next appt:  None Dx:  Abdominal pain, epigastric    Ref Range & Units 5d ago 5mo ago   Sodium 136 - 145 mmol/L 138  138    Potassium 3.5 - 5.1 mmol/L 4.1  4.3    Chloride 95 - 110 mmol/L 104  105    CO2 23 - 29 mmol/L 26  25    Glucose 70 - 110 mg/dL 94  98    BUN, Bld 6 - 20 mg/dL 15  9    Creatinine 0.5 - 1.4 mg/dL 1.0  0.9    Calcium 8.7 - 10.5 mg/dL 9.7  8.7    Total Protein 6.0 - 8.4 g/dL 7.8     Albumin 3.5 - 5.2 g/dL 4.0     Total Bilirubin 0.1 - 1.0 mg/dL 0.6     Comment: For infants and newborns, interpretation of results should be based   on gestational age, weight and in agreement with clinical   observations.   Premature Infant recommended reference ranges:   Up to 24 hours.............<8.0 mg/dL   Up to 48 hours............<12.0 mg/dL   3-5 days..................<15.0 mg/dL   6-29 days.................<15.0 mg/dL    Alkaline Phosphatase 55 - 135 U/L 89     AST 10 - 40 U/L 19     ALT 10 - 44 U/L 25     Anion Gap 8 - 16 mmol/L 8  8    eGFR if African American >60 mL/min/1.73 m^2 >60.0  >60.0    eGFR if non African American >60 mL/min/1.73 m^2 >60.0  >60.0 CM   Comment: Calculation used to obtain the estimated glomerular filtration   rate (eGFR) is the CKD-EPI equation.    Resulting Agency  [facility]         Specimen Collected: 10/27/18  1:36 PM Last Resulted: 10/27/18  6:05 PM Lab Flowsheet Order Details View Encounter Lab and Collection Details Routing Result History       CM=Additional comments             Gautam Ross #[id number] (CSN: [id number]) (43 y.o. M)    Results   Comprehensive metabolic panel (Acc# [id number]) (Order [id number])   Comprehensive metabolic panel   Order: [id number]   Status:  Final result   Visible to patient:  Yes (Patient Portal) Next appt:  None Dx:  Abdominal pain, epigastric    Ref Range & Units 5d ago 5mo ago   Sodium 136 - 145 mmol/L 138  138    Potassium 3.5 - 5.1 mmol/L 4.1  4.3    Chloride 95 - 110 mmol/L 104  105    CO2 23 - 29 mmol/L 26  25    Glucose 70 - 110 mg/dL 94  98    BUN, Bld 6 - 20 mg/dL 15  9    Creatinine 0.5 - 1.4 mg/dL 1.0  0.9    Calcium 8.7 - 10.5 mg/dL 9.7  8.7    Total Protein 6.0 - 8.4 g/dL 7.8     Albumin 3.5 - 5.2 g/dL 4.0     Total Bilirubin 0.1 - 1.0 mg/dL 0.6     Comment: For infants and newborns, interpretation of results should be based   on gestational age, weight and in agreement with clinical   observations.   Premature Infant recommended reference ranges:   Up to 24 hours.............<8.0 mg/dL   Up to 48 hours............<12.0 mg/dL   3-5 days..................<15.0 mg/dL   6-29 days.................<15.0 mg/dL    Alkaline Phosphatase 55 - 135 U/L 89     AST 10 - 40 U/L 19     ALT 10 - 44 U/L 25     Anion Gap 8 - 16 mmol/L 8  8    eGFR if African American >60 mL/min/1.73 m^2 >60.0  >60.0    eGFR if non African American >60 mL/min/1.73 m^2 >60.0  >60.0 CM   Comment: Calculation used to obtain the estimated glomerular filtration   rate (eGFR) is the CKD-EPI equation.    Resulting Agency  [facility]         Specimen Collected: 10/27/18  1:36 PM Last Resulted: 10/27/18  6:05 PM Lab Flowsheet Order Details View Encounter Lab and Collection Details Routing Result History      CM=Additional comments           Other Results from 10/27/2018     CBC auto differential   Order: [id number]     Status:  Final result   Visible to patient:  Yes (Patient Portal) Next appt:  None Dx:  Abdominal pain, epigastric    Ref Range &  Units 5d ago 5mo ago   WBC 3.90 - 12.70 K/uL 6.06  4.93    RBC 4.60 - 6.20 M/uL 5.35  4.63    Hemoglobin 14.0 - 18.0 g/dL 14.8  11.9 Abnormally low     Hematocrit 40.0 - 54.0 % 45.0  37.9 Abnormally low     MCV 82 - 98 fL 84  82    MCH 27.0 - 31.0 pg 27.7  25.7 Abnormally low     MCHC 32.0 - 36.0 g/dL 32.9  31.4 Abnormally low     RDW 11.5 - 14.5 % 13.7  13.2    Platelets 150 - 350 K/uL 219  381 Abnormally high     MPV 9.2 - 12.9 fL 11.4  9.7    Gran # (ANC) 1.8 - 7.7 K/uL 3.1  3.2    Lymph # 1.0 - 4.8 K/uL 2.2  1.0    Mono # 0.3 - 1.0 K/uL 0.5  0.4    Eos # 0.0 - 0.5 K/uL 0.2  0.2    Baso # 0.00 - 0.20 K/uL 0.05  0.03    nRBC 0 /100 WBC 0  0    Gran% 38.0 - 73.0 % 51.8  65.3    Lymph% 18.0 - 48.0 % 36.3  20.1    Mono% 4.0 - 15.0 % 7.8  8.9    Eosinophil% 0.0 - 8.0 % 3.3  4.7    Basophil% 0.0 - 1.9 % 0.8  0.6    Differential Method  Automated  Automated    Immature Granulocytes   0.4    Immature Grans (Abs)   0.02 CM   Resulting Agency  [facility]         Specimen Collected: 10/27/18  1:36 PM Last Resulted: 10/27/18  6:04 PM Lab Flowsheet Order Details View Encounter Lab and Collection Details Routing Result History      CM=Additional comments               Diagnostic Results:    Narrative     EXAMINATION:  US ABDOMEN COMPLETE    CLINICAL HISTORY:  Epigastric pain    TECHNIQUE:  Complete abdominal ultrasound (including pancreas, liver, gallbladder, common bile duct, spleen, aorta, IVC, and kidneys) was performed.    COMPARISON:  CT abdomen pelvis with contrast 05/27/2018    FINDINGS:  Liver: Normal in size, measuring 15.2 cm. Homogeneous echotexture with a normal hepatorenal index of 1.23. no focal hepatic lesions.    Gallbladder: Contracted secondary to recent p.o. intake.  There are layering gallstones within the gallbladder.  There is no sonographic Vazquez sign.    Biliary system: The common duct is not dilated, measuring 2.0 mm.  No intrahepatic ductal dilatation.    Spleen: Upper normal with a homogeneous  echotexture, measuring 12.8 x 4.0 cm.    Pancreas: The visualized portions of pancreas appear normal.    Right kidney: Normal in size with no hydronephrosis, measuring 11.8 cm.    Left kidney:  Normal in size with no hydronephrosis, measuring 12.9 cm.    Aorta: No aneurysm.    Inferior vena cava: Normal in appearance.    Miscellaneous: No ascites.      Impression       Contracted gallbladder (due to nonfasting state) containing multiple small gallstones.    Electronically signed by resident: Yoseph Barone  Date: 10/27/2018  Time: 14:07    Electronically signed by: Macy Levy MD  Date: 10/27/2018  Time: 14:40       ASSESSMENT/PLAN:     Gallstones     PLAN:Plan     Gloria

## 2018-11-08 ENCOUNTER — HOSPITAL ENCOUNTER (OUTPATIENT)
Dept: PREADMISSION TESTING | Facility: OTHER | Age: 43
Discharge: HOME | End: 2018-11-08
Attending: SPECIALIST
Payer: COMMERCIAL

## 2018-11-08 ENCOUNTER — ANESTHESIA EVENT (OUTPATIENT)
Dept: SURGERY | Facility: OTHER | Age: 43
End: 2018-11-08
Payer: COMMERCIAL

## 2018-11-08 VITALS
HEIGHT: 68 IN | TEMPERATURE: 98 F | HEART RATE: 71 BPM | OXYGEN SATURATION: 98 % | DIASTOLIC BLOOD PRESSURE: 63 MMHG | SYSTOLIC BLOOD PRESSURE: 108 MMHG | WEIGHT: 181 LBS | BODY MASS INDEX: 27.43 KG/M2

## 2018-11-08 DIAGNOSIS — K82.9 DISEASE OF GALLBLADDER: Primary | ICD-10-CM

## 2018-11-08 LAB
AMYLASE SERPL-CCNC: 38 U/L
LIPASE SERPL-CCNC: 29 U/L

## 2018-11-08 PROCEDURE — 82150 ASSAY OF AMYLASE: CPT

## 2018-11-08 PROCEDURE — 36415 COLL VENOUS BLD VENIPUNCTURE: CPT

## 2018-11-08 PROCEDURE — 83690 ASSAY OF LIPASE: CPT

## 2018-11-08 RX ORDER — AMOXICILLIN 500 MG
CAPSULE ORAL DAILY
COMMUNITY
End: 2022-06-22

## 2018-11-08 RX ORDER — ALPRAZOLAM 0.25 MG/1
0.25 TABLET, ORALLY DISINTEGRATING ORAL
Status: CANCELLED | OUTPATIENT
Start: 2018-11-08 | End: 2018-11-08

## 2018-11-08 RX ORDER — SODIUM CHLORIDE, SODIUM LACTATE, POTASSIUM CHLORIDE, CALCIUM CHLORIDE 600; 310; 30; 20 MG/100ML; MG/100ML; MG/100ML; MG/100ML
INJECTION, SOLUTION INTRAVENOUS CONTINUOUS
Status: CANCELLED | OUTPATIENT
Start: 2018-11-08

## 2018-11-08 RX ORDER — MULTIVITAMIN
1 TABLET ORAL DAILY
COMMUNITY
End: 2022-06-22

## 2018-11-08 RX ORDER — LIDOCAINE HYDROCHLORIDE 10 MG/ML
0.5 INJECTION, SOLUTION EPIDURAL; INFILTRATION; INTRACAUDAL; PERINEURAL ONCE
Status: CANCELLED | OUTPATIENT
Start: 2018-11-08 | End: 2018-11-08

## 2018-11-08 NOTE — ANESTHESIA PREPROCEDURE EVALUATION
11/08/2018  Gautam Ross is a 43 y.o., male.    Anesthesia Evaluation    I have reviewed the Patient Summary Reports.    I have reviewed the Nursing Notes.   I have reviewed the Medications.     Review of Systems  Anesthesia Hx:  History of prior surgery of interest to airway management or planning: Denies Family Hx of Anesthesia complications.   Denies Personal Hx of Anesthesia complications.   Social:  Non-Smoker    Hematology/Oncology:  Hematology Normal   Oncology Normal     EENT/Dental:   chronic allergic rhinitis   Cardiovascular:  Cardiovascular Normal     Pulmonary:  Pulmonary Normal    Renal/:  Renal/ Normal     Hepatic/GI:  Hepatic/GI Normal    Musculoskeletal:  Musculoskeletal Normal    Neurological:  Neurology Normal    Endocrine:  Endocrine Normal    Dermatological:  Skin Normal    Psych:  Psychiatric Normal           Physical Exam  General:  Well nourished    Airway/Jaw/Neck:  Airway Findings: Mouth Opening: Normal Tongue: Normal  General Airway Assessment: Adult  Mallampati: I  TM Distance: Normal, at least 6 cm      Dental:  Dental Findings: In tact   Chest/Lungs:  Chest/Lungs Findings: Clear to auscultation         Mental Status:  Mental Status Findings:  Alert and Oriented, Cooperative         Anesthesia Plan  Type of Anesthesia, risks & benefits discussed:  Anesthesia Type:  general  Patient's Preference:   Intra-op Monitoring Plan: standard ASA monitors  Intra-op Monitoring Plan Comments:   Post Op Pain Control Plan: multimodal analgesia  Post Op Pain Control Plan Comments:   Induction:   IV  Beta Blocker:         Informed Consent: Patient understands risks and agrees with Anesthesia plan.  Questions answered. Anesthesia consent signed with patient.  ASA Score: 1     Day of Surgery Review of History & Physical:    H&P update referred to the surgeon.     Anesthesia Plan Notes:  Labs in epic        Ready For Surgery From Anesthesia Perspective.

## 2018-11-08 NOTE — DISCHARGE INSTRUCTIONS
PRE-ADMIT TESTING -  [phone]    [address]  [place] [facility]          Your surgery has been scheduled at [facility]. We are pleased to have the opportunity to serve you. For Further Information please call [phone].    On the day of surgery please report to the Information Desk on the 1st floor.    · CONTACT YOUR PHYSICIAN'S OFFICE THE DAY PRIOR TO YOUR SURGERY TO OBTAIN YOUR ARRIVAL TIME.     · The evening before surgery do not eat anything after 9 p.m. ( this includes hard candy, chewing gum and mints).  You may only have GATORADE, POWERADE AND WATER  from 9 p.m. until you leave your home.   DO NOT DRINK ANY LIQUIDS ON THE WAY TO THE HOSPITAL.      SPECIAL MEDICATION INSTRUCTIONS: TAKE medications checked off by the Anesthesiologist on your Medication List.    Angiogram Patients: Take medications as instructed by your physician, including aspirin.     Surgery Patients:    If you take ASPIRIN - Your PHYSICIAN/SURGEON will need to inform you IF/OR when you need to stop taking aspirin prior to your surgery.     Do Not take any medications containing IBUPROFEN.  Do Not Wear any make-up or dark nail polish   (especially eye make-up) to surgery. If you come to surgery with makeup on you will be required to remove the makeup or nail polish.    Do not shave your surgical area at least 5 days prior to your surgery. The surgical prep will be performed at the hospital according to Infection Control regulations.    Leave all valuables at home.   Do Not wear any jewelry or watches, including any metal in body piercings.  Contact Lens must be removed before surgery. Either do not wear the contact lens or bring a case and solution for storage.  Please bring a container for eyeglasses or dentures as required.  Bring any paperwork your physician has provided, such as consent forms,  history and physicals, doctor's orders, etc.   Bring comfortable clothes that are loose fitting to wear upon  discharge. Take into consideration the type of surgery being performed.  Maintain your diet as advised per your physician the day prior to surgery.      Adequate rest the night before surgery is advised.   Park in the Parking lot behind the hospital or in the [facility] across the street from the parking lot. Parking is complimentary.  If you will be discharged the same day as your procedure, please arrange for a responsible adult to drive you home or to accompany you if traveling by taxi.   YOU WILL NOT BE PERMITTED TO DRIVE OR TO LEAVE THE HOSPITAL ALONE AFTER SURGERY.   It is strongly recommended that you arrange for someone to remain with you for the first 24 hrs following your surgery.       Thank you for your cooperation.  The Staff of [facility].        Bathing Instructions                                                                 Please shower the evening before and morning of your procedure with    ANTIBACTERIAL SOAP. ( DIAL, etc )  Concentrate on the surgical area   for at least 3 minutes and rinse completely. Dry off as usual.   Do not use any deodorant, powder, body lotions, perfume, after shave or    cologne.

## 2018-11-09 ENCOUNTER — PATIENT MESSAGE (OUTPATIENT)
Dept: INTERNAL MEDICINE | Facility: CLINIC | Age: 43
End: 2018-11-09

## 2018-11-15 ENCOUNTER — HOSPITAL ENCOUNTER (OUTPATIENT)
Facility: OTHER | Age: 43
Discharge: HOME | End: 2018-11-16
Attending: SPECIALIST | Admitting: SPECIALIST
Payer: COMMERCIAL

## 2018-11-15 ENCOUNTER — ANESTHESIA (OUTPATIENT)
Dept: SURGERY | Facility: OTHER | Age: 43
End: 2018-11-15
Payer: COMMERCIAL

## 2018-11-15 VITALS
HEART RATE: 80 BPM | HEIGHT: 68 IN | DIASTOLIC BLOOD PRESSURE: 66 MMHG | WEIGHT: 181 LBS | SYSTOLIC BLOOD PRESSURE: 124 MMHG | BODY MASS INDEX: 27.43 KG/M2 | RESPIRATION RATE: 16 BRPM | TEMPERATURE: 99 F | OXYGEN SATURATION: 96 %

## 2018-11-15 DIAGNOSIS — K82.9 GALLBLADDER ATTACK: ICD-10-CM

## 2018-11-15 PROCEDURE — 25000003 PHARM REV CODE 250: Performed by: ANESTHESIOLOGY

## 2018-11-15 PROCEDURE — 63600175 PHARM REV CODE 636 W HCPCS: Performed by: SPECIALIST

## 2018-11-15 PROCEDURE — 71000016 HC POSTOP RECOV ADDL HR: Performed by: SPECIALIST

## 2018-11-15 PROCEDURE — 27201423 OPTIME MED/SURG SUP & DEVICES STERILE SUPPLY: Performed by: SPECIALIST

## 2018-11-15 PROCEDURE — 37000009 HC ANESTHESIA EA ADD 15 MINS: Performed by: SPECIALIST

## 2018-11-15 PROCEDURE — 25000003 PHARM REV CODE 250: Performed by: SPECIALIST

## 2018-11-15 PROCEDURE — 36000709 HC OR TIME LEV III EA ADD 15 MIN: Performed by: SPECIALIST

## 2018-11-15 PROCEDURE — 71000033 HC RECOVERY, INTIAL HOUR: Performed by: SPECIALIST

## 2018-11-15 PROCEDURE — C1729 CATH, DRAINAGE: HCPCS | Performed by: SPECIALIST

## 2018-11-15 PROCEDURE — 88304 TISSUE EXAM BY PATHOLOGIST: CPT | Mod: 26,,, | Performed by: PATHOLOGY

## 2018-11-15 PROCEDURE — 71000015 HC POSTOP RECOV 1ST HR: Performed by: SPECIALIST

## 2018-11-15 PROCEDURE — 71000039 HC RECOVERY, EACH ADD'L HOUR: Performed by: SPECIALIST

## 2018-11-15 PROCEDURE — 63600175 PHARM REV CODE 636 W HCPCS: Performed by: ANESTHESIOLOGY

## 2018-11-15 PROCEDURE — 63600175 PHARM REV CODE 636 W HCPCS: Performed by: NURSE ANESTHETIST, CERTIFIED REGISTERED

## 2018-11-15 PROCEDURE — 36000708 HC OR TIME LEV III 1ST 15 MIN: Performed by: SPECIALIST

## 2018-11-15 PROCEDURE — 88304 TISSUE EXAM BY PATHOLOGIST: CPT | Performed by: PATHOLOGY

## 2018-11-15 PROCEDURE — 37000008 HC ANESTHESIA 1ST 15 MINUTES: Performed by: SPECIALIST

## 2018-11-15 PROCEDURE — 25000003 PHARM REV CODE 250: Performed by: NURSE ANESTHETIST, CERTIFIED REGISTERED

## 2018-11-15 RX ORDER — HYDROCODONE BITARTRATE AND ACETAMINOPHEN 5; 325 MG/1; MG/1
1 TABLET ORAL EVERY 4 HOURS PRN
Status: DISCONTINUED | OUTPATIENT
Start: 2018-11-15 | End: 2018-11-19 | Stop reason: HOSPADM

## 2018-11-15 RX ORDER — SODIUM CHLORIDE, SODIUM LACTATE, POTASSIUM CHLORIDE, CALCIUM CHLORIDE 600; 310; 30; 20 MG/100ML; MG/100ML; MG/100ML; MG/100ML
INJECTION, SOLUTION INTRAVENOUS CONTINUOUS
Status: DISCONTINUED | OUTPATIENT
Start: 2018-11-15 | End: 2018-11-19 | Stop reason: HOSPADM

## 2018-11-15 RX ORDER — OXYCODONE HYDROCHLORIDE 5 MG/1
5 TABLET ORAL
Status: DISCONTINUED | OUTPATIENT
Start: 2018-11-15 | End: 2018-11-19 | Stop reason: HOSPADM

## 2018-11-15 RX ORDER — ACETAMINOPHEN 10 MG/ML
INJECTION, SOLUTION INTRAVENOUS
Status: DISCONTINUED | OUTPATIENT
Start: 2018-11-15 | End: 2018-11-15

## 2018-11-15 RX ORDER — DEXAMETHASONE SODIUM PHOSPHATE 4 MG/ML
INJECTION, SOLUTION INTRA-ARTICULAR; INTRALESIONAL; INTRAMUSCULAR; INTRAVENOUS; SOFT TISSUE
Status: DISCONTINUED | OUTPATIENT
Start: 2018-11-15 | End: 2018-11-15

## 2018-11-15 RX ORDER — SODIUM CHLORIDE 9 MG/ML
INJECTION, SOLUTION INTRAVENOUS CONTINUOUS
Status: DISCONTINUED | OUTPATIENT
Start: 2018-11-15 | End: 2018-11-19 | Stop reason: HOSPADM

## 2018-11-15 RX ORDER — ROCURONIUM BROMIDE 10 MG/ML
INJECTION, SOLUTION INTRAVENOUS
Status: DISCONTINUED | OUTPATIENT
Start: 2018-11-15 | End: 2018-11-15

## 2018-11-15 RX ORDER — KETOROLAC TROMETHAMINE 30 MG/ML
INJECTION, SOLUTION INTRAMUSCULAR; INTRAVENOUS
Status: DISCONTINUED | OUTPATIENT
Start: 2018-11-15 | End: 2018-11-15

## 2018-11-15 RX ORDER — GLYCOPYRROLATE 0.2 MG/ML
INJECTION INTRAMUSCULAR; INTRAVENOUS
Status: DISCONTINUED | OUTPATIENT
Start: 2018-11-15 | End: 2018-11-15

## 2018-11-15 RX ORDER — PROPOFOL 10 MG/ML
VIAL (ML) INTRAVENOUS
Status: DISCONTINUED | OUTPATIENT
Start: 2018-11-15 | End: 2018-11-15

## 2018-11-15 RX ORDER — FENTANYL CITRATE 50 UG/ML
25 INJECTION, SOLUTION INTRAMUSCULAR; INTRAVENOUS EVERY 5 MIN PRN
Status: COMPLETED | OUTPATIENT
Start: 2018-11-15 | End: 2018-11-15

## 2018-11-15 RX ORDER — SODIUM CHLORIDE 0.9 % (FLUSH) 0.9 %
3 SYRINGE (ML) INJECTION
Status: DISCONTINUED | OUTPATIENT
Start: 2018-11-15 | End: 2018-11-19 | Stop reason: HOSPADM

## 2018-11-15 RX ORDER — LIDOCAINE HYDROCHLORIDE 10 MG/ML
1 INJECTION, SOLUTION EPIDURAL; INFILTRATION; INTRACAUDAL; PERINEURAL ONCE
Status: DISCONTINUED | OUTPATIENT
Start: 2018-11-15 | End: 2018-11-19 | Stop reason: HOSPADM

## 2018-11-15 RX ORDER — LIDOCAINE HCL/PF 100 MG/5ML
SYRINGE (ML) INTRAVENOUS
Status: DISCONTINUED | OUTPATIENT
Start: 2018-11-15 | End: 2018-11-15

## 2018-11-15 RX ORDER — NEOSTIGMINE METHYLSULFATE 1 MG/ML
INJECTION, SOLUTION INTRAVENOUS
Status: DISCONTINUED | OUTPATIENT
Start: 2018-11-15 | End: 2018-11-15

## 2018-11-15 RX ORDER — CEFAZOLIN SODIUM 1 G/3ML
2 INJECTION, POWDER, FOR SOLUTION INTRAMUSCULAR; INTRAVENOUS
Status: COMPLETED | OUTPATIENT
Start: 2018-11-15 | End: 2018-11-15

## 2018-11-15 RX ORDER — BUPIVACAINE HCL/EPINEPHRINE 0.25-.0005
VIAL (ML) INJECTION
Status: DISCONTINUED | OUTPATIENT
Start: 2018-11-15 | End: 2018-11-15 | Stop reason: HOSPADM

## 2018-11-15 RX ORDER — OXYCODONE HYDROCHLORIDE 5 MG/1
10 TABLET ORAL EVERY 4 HOURS PRN
Status: DISCONTINUED | OUTPATIENT
Start: 2018-11-15 | End: 2018-11-19 | Stop reason: HOSPADM

## 2018-11-15 RX ORDER — OXYCODONE AND ACETAMINOPHEN 7.5; 325 MG/1; MG/1
1 TABLET ORAL EVERY 4 HOURS PRN
Qty: 28 TABLET | Refills: 0 | Status: SHIPPED | OUTPATIENT
Start: 2018-11-15 | End: 2019-08-01

## 2018-11-15 RX ORDER — FENTANYL CITRATE 50 UG/ML
INJECTION, SOLUTION INTRAMUSCULAR; INTRAVENOUS
Status: DISCONTINUED | OUTPATIENT
Start: 2018-11-15 | End: 2018-11-15

## 2018-11-15 RX ORDER — ONDANSETRON 2 MG/ML
4 INJECTION INTRAMUSCULAR; INTRAVENOUS DAILY PRN
Status: DISCONTINUED | OUTPATIENT
Start: 2018-11-15 | End: 2018-11-19 | Stop reason: HOSPADM

## 2018-11-15 RX ORDER — ACETAMINOPHEN 325 MG/1
650 TABLET ORAL EVERY 4 HOURS PRN
Status: DISCONTINUED | OUTPATIENT
Start: 2018-11-15 | End: 2018-11-19 | Stop reason: HOSPADM

## 2018-11-15 RX ORDER — ALPRAZOLAM 0.5 MG/1
0.5 TABLET, ORALLY DISINTEGRATING ORAL
Status: COMPLETED | OUTPATIENT
Start: 2018-11-15 | End: 2018-11-15

## 2018-11-15 RX ORDER — MEPERIDINE HYDROCHLORIDE 50 MG/ML
12.5 INJECTION INTRAMUSCULAR; INTRAVENOUS; SUBCUTANEOUS ONCE AS NEEDED
Status: ACTIVE | OUTPATIENT
Start: 2018-11-15 | End: 2018-11-15

## 2018-11-15 RX ORDER — LIDOCAINE HYDROCHLORIDE 10 MG/ML
0.5 INJECTION, SOLUTION EPIDURAL; INFILTRATION; INTRACAUDAL; PERINEURAL ONCE
Status: DISCONTINUED | OUTPATIENT
Start: 2018-11-15 | End: 2018-11-19 | Stop reason: HOSPADM

## 2018-11-15 RX ORDER — ONDANSETRON 8 MG/1
8 TABLET, ORALLY DISINTEGRATING ORAL EVERY 8 HOURS PRN
Status: DISCONTINUED | OUTPATIENT
Start: 2018-11-15 | End: 2018-11-19 | Stop reason: HOSPADM

## 2018-11-15 RX ORDER — ALPRAZOLAM 0.25 MG/1
0.25 TABLET, ORALLY DISINTEGRATING ORAL
Status: DISCONTINUED | OUTPATIENT
Start: 2018-11-15 | End: 2018-11-15

## 2018-11-15 RX ORDER — ONDANSETRON 2 MG/ML
INJECTION INTRAMUSCULAR; INTRAVENOUS
Status: DISCONTINUED | OUTPATIENT
Start: 2018-11-15 | End: 2018-11-15

## 2018-11-15 RX ADMIN — CARBOXYMETHYLCELLULOSE SODIUM 4 DROP: 2.5 SOLUTION/ DROPS OPHTHALMIC at 09:11

## 2018-11-15 RX ADMIN — FENTANYL CITRATE 150 MCG: 50 INJECTION, SOLUTION INTRAMUSCULAR; INTRAVENOUS at 09:11

## 2018-11-15 RX ADMIN — GLYCOPYRROLATE 0.2 MG: 0.2 INJECTION, SOLUTION INTRAMUSCULAR; INTRAVENOUS at 09:11

## 2018-11-15 RX ADMIN — SODIUM CHLORIDE, SODIUM LACTATE, POTASSIUM CHLORIDE, AND CALCIUM CHLORIDE: 600; 310; 30; 20 INJECTION, SOLUTION INTRAVENOUS at 09:11

## 2018-11-15 RX ADMIN — FENTANYL CITRATE 25 MCG: 50 INJECTION, SOLUTION INTRAMUSCULAR; INTRAVENOUS at 11:11

## 2018-11-15 RX ADMIN — DEXAMETHASONE SODIUM PHOSPHATE 8 MG: 4 INJECTION, SOLUTION INTRAMUSCULAR; INTRAVENOUS at 10:11

## 2018-11-15 RX ADMIN — NEOSTIGMINE METHYLSULFATE 4 MG: 1 INJECTION INTRAVENOUS at 10:11

## 2018-11-15 RX ADMIN — SODIUM CHLORIDE, SODIUM LACTATE, POTASSIUM CHLORIDE, AND CALCIUM CHLORIDE: 600; 310; 30; 20 INJECTION, SOLUTION INTRAVENOUS at 10:11

## 2018-11-15 RX ADMIN — ACETAMINOPHEN 1000 MG: 10 INJECTION, SOLUTION INTRAVENOUS at 09:11

## 2018-11-15 RX ADMIN — LIDOCAINE HYDROCHLORIDE 100 MG: 20 INJECTION, SOLUTION INTRAVENOUS at 09:11

## 2018-11-15 RX ADMIN — ALPRAZOLAM 0.5 MG: 0.5 TABLET, ORALLY DISINTEGRATING ORAL at 08:11

## 2018-11-15 RX ADMIN — OXYCODONE HYDROCHLORIDE 5 MG: 5 TABLET ORAL at 11:11

## 2018-11-15 RX ADMIN — GLYCOPYRROLATE 0.8 MG: 0.2 INJECTION, SOLUTION INTRAMUSCULAR; INTRAVENOUS at 10:11

## 2018-11-15 RX ADMIN — KETOROLAC TROMETHAMINE 30 MG: 30 INJECTION, SOLUTION INTRAMUSCULAR; INTRAVENOUS at 11:11

## 2018-11-15 RX ADMIN — ONDANSETRON 4 MG: 2 INJECTION INTRAMUSCULAR; INTRAVENOUS at 10:11

## 2018-11-15 RX ADMIN — FENTANYL CITRATE 50 MCG: 50 INJECTION, SOLUTION INTRAMUSCULAR; INTRAVENOUS at 10:11

## 2018-11-15 RX ADMIN — ROCURONIUM BROMIDE 50 MG: 10 INJECTION, SOLUTION INTRAVENOUS at 09:11

## 2018-11-15 RX ADMIN — CEFAZOLIN 2 G: 330 INJECTION, POWDER, FOR SOLUTION INTRAMUSCULAR; INTRAVENOUS at 10:11

## 2018-11-15 RX ADMIN — FENTANYL CITRATE 25 MCG: 50 INJECTION, SOLUTION INTRAMUSCULAR; INTRAVENOUS at 12:11

## 2018-11-15 RX ADMIN — PROPOFOL 250 MG: 10 INJECTION, EMULSION INTRAVENOUS at 09:11

## 2018-11-15 RX ADMIN — KETOROLAC TROMETHAMINE 30 MG: 30 INJECTION, SOLUTION INTRAMUSCULAR; INTRAVENOUS at 10:11

## 2018-11-15 RX ADMIN — PROPOFOL 75 MG: 10 INJECTION, EMULSION INTRAVENOUS at 10:11

## 2018-11-15 NOTE — BRIEF OP NOTE
[facility]  Brief Operative Note     SUMMARY     Surgery Date: 11/15/2018     Surgeon(s) and Role:     * Korey Keller Jr., MD - Primary    Assisting Surgeon: None    Pre-op Diagnosis:  Disease of gallbladder [K82.9]    Post-op Diagnosis:  Post-Op Diagnosis Codes:     * Disease of gallbladder [K82.9]    Procedure(s) (LRB):  CHOLECYSTECTOMY, LAPAROSCOPIC (N/A)    Anesthesia: General    Description of the findings of the procedure:  Chronic cholecystitis    Findings/Key Components: above    Estimated Blood Loss: minimal         Specimens:   Specimen (12h ago, onward)    Start     Ordered    11/15/18 1045  Specimen to Pathology - Surgery  Once     Comments:  1. GALLBLADDER     Start Status   11/15/18 1045 Collected (11/15/18 1045)       11/15/18 1044          Discharge Note    SUMMARY     Admit Date: 11/15/2018    Discharge Date and Time:  11/15/2018 11:11 AM    Hospital Course (synopsis of major diagnoses, care, treatment, and services provided during the course of the hospital stay): benign     Final Diagnosis: Post-Op Diagnosis Codes:     * Disease of gallbladder [K82.9]    Disposition: Home or Self Care    Follow Up/Patient Instructions:     Medications:  Reconciled Home Medications:      Medication List      START taking these medications    oxyCODONE-acetaminophen 7.5-325 mg per tablet  Commonly known as:  PERCOCET  Take 1 tablet by mouth every 4 (four) hours as needed.        CONTINUE taking these medications    (MAGIC MOUTHWASH) 1:1:1 BENADRYL 12.5MG/5ML LIQ, ALUMINUM & MAGNESIUM  Swish and spit 10 mLs every 4 (four) hours as needed (sore throat). for mouth sores     fish oil-omega-3 fatty acids 300-1,000 mg capsule  Take by mouth once daily.     fluticasone 50 mcg/actuation nasal spray  Commonly known as:  FLONASE  1 spray by Each Nare route once daily. Each nostril daily     ONE DAILY MULTIVITAMIN per tablet  Generic drug:  multivitamin  Take 1 tablet by mouth once daily.     UNABLE TO  FIND  replenex-joint supplement          Discharge Procedure Orders   Diet general     Call MD for:  persistent nausea and vomiting     Call MD for:  severe uncontrolled pain     Call MD for:  redness, tenderness, or signs of infection (pain, swelling, redness, odor or green/yellow discharge around incision site)     Lifting restrictions   Scheduling Instructions: 20 Lbs     Wound care routine (specify)   Order Comments: Wound care routine: leave steri strips intact intact  May shower

## 2018-11-15 NOTE — PLAN OF CARE
Gautam Ross has met all discharge criteria from Phase II. Vital Signs are stable, ambulating  without difficulty. Discharge instructions given, patient verbalized understanding. Discharged from facility via wheelchair in stable condition.

## 2018-11-15 NOTE — DISCHARGE INSTRUCTIONS
Discharge Instructions for Laparoscopic Cholecystectomy  You have had a procedure known as a laparoscopic cholecystectomy. A laparoscopic cholecystectomy is a procedure to remove your gallbladder. People who have this procedure usually recover more quickly and have less pain than with open gallbladder surgery (called open cholecystectomy). Many surgeons recommend a low-fat diet, avoiding fried food in particular, for the first month after surgery.   You can live a full and healthy life without your gallbladder. This includes eating the foods and doing the things you enjoyed before your gallbladder problems started.    Home care  Recommendations for home care include the following:   · Ask someone to drive you to your appointments for the next 3 days. Dont drive until you are no longer taking pain medicine and are able to step on the brake pedal without hesitation.   · Wash the skin around your incision daily with mild soap and water. It's OK to shower the day after your surgery.  · Eat your regular diet. It is wise to stay away from rich, greasy, or spicy food for a few days.  · Remember, it takes at least 1 week for you to get most of your strength and energy back.  · Make an office visit to talk to your healthcare provider if the following symptoms dont go away within a week after your surgery:  ¨ Fatigue  ¨ Pain around the incision  ¨ Diarrhea or constipation  ¨ Loss of appetite     When to call your healthcare provider  Call your healthcare provider immediately if you have any of the following:  · Yellowing of your eyes or skin (jaundice)  · Chills  · Fever of 100.4°F (38.0°C) or higher, or as directed by your healthcare provider   · Redness, swelling, increasing pain, pus, or a foul smell at the incision site  · Dark or rust-colored urine  · Stool that is john-colored or light in color instead of brown  · Increasing belly pain  · Rectal bleeding  · Leg swelling or shortness of breath       Discharge  Instructions: After Your Surgery  Youve just had surgery. During surgery, you were given medicine called anesthesia to keep you relaxed and free of pain. After surgery, you may have some pain or nausea. This is common. Here are some tips for feeling better and getting well after surgery.     Stay on schedule with your medicine.     Going home  Your healthcare provider will show you how to take care of yourself when you go home. He or she will also answer your questions. Have an adult family member or friend drive you home. For the first 24 hours after your surgery:    · Do not drive or use heavy equipment.  · Do not make important decisions or sign legal papers.  · Do not drink alcohol.  · Have someone stay with you, if needed. He or she can watch for problems and help keep you safe.    Be sure to go to all follow-up visits with your healthcare provider. And rest after your surgery for as long as your healthcare provider tells you to.    Coping with pain  If you have pain after surgery, pain medicine will help you feel better. Take it as told, before pain becomes severe. Also, ask your healthcare provider or pharmacist about other ways to control pain. This might be with heat, ice, or relaxation. And follow any other instructions your surgeon or nurse gives you.    Tips for taking pain medicine  To get the best relief possible, remember these points:    · Pain medicines can upset your stomach. Taking them with a little food may help.  · Most pain relievers taken by mouth need at least 20 to 30 minutes to start to work.  · Taking medicine on a schedule can help you remember to take it. Try to time your medicine so that you can take it before starting an activity. This might be before you get dressed, go for a walk, or sit down for dinner.  · Constipation is a common side effect of pain medicines. Call your healthcare provider before taking any medicines such as laxatives or stool softeners to help ease constipation.  Also ask if you should skip any foods. Drinking lots of fluids and eating foods such as fruits and vegetables that are high in fiber can also help. Remember, do not take laxatives unless your surgeon has prescribed them.  · Drinking alcohol and taking pain medicine can cause dizziness and slow your breathing. It can even be deadly. Do not drink alcohol while taking pain medicine.  · Pain medicine can make you react more slowly to things. Do not drive or run machinery while taking pain medicine.    Your healthcare provider may tell you to take acetaminophen to help ease your pain. Ask him or her how much you are supposed to take each day. Acetaminophen or other pain relievers may interact with your prescription medicines or other over-the-counter (OTC) medicines. Some prescription medicines have acetaminophen and other ingredients. Using both prescription and OTC acetaminophen for pain can cause you to overdose. Read the labels on your OTC medicines with care. This will help you to clearly know the list of ingredients, how much to take, and any warnings. It may also help you not take too much acetaminophen. If you have questions or do not understand the information, ask your pharmacist or healthcare provider to explain it to you before you take the OTC medicine.    Managing nausea  Some people have an upset stomach after surgery. This is often because of anesthesia, pain, or pain medicine, or the stress of surgery. These tips will help you handle nausea and eat healthy foods as you get better. If you were on a special food plan before surgery, ask your healthcare provider if you should follow it while you get better. These tips may help:    · Do not push yourself to eat. Your body will tell you when to eat and how much.  · Start off with clear liquids and soup. They are easier to digest.  · Next try semi-solid foods, such as mashed potatoes, applesauce, and gelatin, as you feel ready.  · Slowly move to solid foods.  Dont eat fatty, rich, or spicy foods at first.  · Do not force yourself to have 3 large meals a day. Instead eat smaller amounts more often.  · Take pain medicines with a small amount of solid food, such as crackers or toast, to avoid nausea.     Call your surgeon if  · You still have pain an hour after taking medicine. The medicine may not be strong enough.  · You feel too sleepy, dizzy, or groggy. The medicine may be too strong.  · You have side effects like nausea, vomiting, or skin changes, such as rash, itching, or hives.       If you have obstructive sleep apnea  You were given anesthesia medicine during surgery to keep you comfortable and free of pain. After surgery, you may have more apnea spells because of this medicine and other medicines you were given. The spells may last longer than usual.   At home:    · Keep using the continuous positive airway pressure (CPAP) device when you sleep. Unless your healthcare provider tells you not to, use it when you sleep, day or night. CPAP is a common device used to treat obstructive sleep apnea.  · Talk with your provider before taking any pain medicine, muscle relaxants, or sedatives. Your provider will tell you about the possible dangers of taking these medicines.    Date Last Reviewed: 12/1/2016 © 2000-2017 [employer]. [address] [place] [address], [place] [address]. All rights reserved. This information is not intended as a substitute for professional medical care. Always follow your healthcare professional's instructions.    PLEASE FOLLOW ANY OTHER INSTRUCTIONS PROVIDED TO YOU BY DR. AMEZQUITA!

## 2018-11-15 NOTE — ANESTHESIA POSTPROCEDURE EVALUATION
"Anesthesia Post Evaluation    Patient: Gautam Ross    Procedure(s) Performed: Procedure(s) (LRB):  CHOLECYSTECTOMY, LAPAROSCOPIC (N/A)    Final Anesthesia Type: general  Patient location during evaluation: PACU  Patient participation: Yes- Able to Participate  Level of consciousness: awake and alert  Post-procedure vital signs: reviewed and stable  Pain management: adequate  Airway patency: patent  PONV status at discharge: No PONV  Anesthetic complications: no      Cardiovascular status: blood pressure returned to baseline  Respiratory status: unassisted, spontaneous ventilation and room air  Hydration status: euvolemic  Follow-up not needed.        Visit Vitals  /66 (BP Location: Right arm, Patient Position: Lying)   Pulse 80   Temp 37 °C (98.6 °F) (Oral)   Resp 16   Ht 5' 8" (1.727 m)   Wt 82.1 kg (181 lb)   SpO2 96%   BMI 27.52 kg/m²       Pain/Marily Score: Pain Assessment Performed: Yes (11/15/2018 12:20 PM)  Presence of Pain: complains of pain/discomfort (11/15/2018 12:20 PM)  Pain Rating Prior to Med Admin: 3 (11/15/2018 12:02 PM)  Pain Rating Post Med Admin: 1 (11/15/2018 12:10 PM)  Marily Score: 10 (11/15/2018 12:20 PM)        "

## 2018-11-15 NOTE — OP NOTE
DATE OF PROCEDURE:  11/15/2018    PREOPERATIVE DIAGNOSIS:  Symptomatic gallbladder disease.    POSTOPERATIVE DIAGNOSIS:  Symptomatic gallbladder disease.    PROCEDURE:  Laparoscopic cholecystectomy.    SURGEON:  Korey Keller Jr., M.D.    ESTIMATED BLOOD LOSS:  25 mL.    PROCEDURE IN DETAIL:  The patient was brought to the Operating Room, placed in   supine position.  The abdomen prepped and draped in a sterile fashion.  A small   incision was made at the umbilicus.  Veress needle inserted and pneumoperitoneum   achieved.  A 10-mm Optiview trocar was inserted at the umbilicus under direct   observation.  Three 5-mm trocars were inserted, one in the upper midline, two in   the right upper quadrant.  Gallbladder was visualized, seemed to be chronically   inflamed.  Using blunt dissection, adhesions to the gallbladder were taken   down, exposing the cystic artery and duct.  These were doubly clamped proximally   then transected.  Then using electrocautery Bovie, the gallbladder was removed   from the liver bed.  Under direct observation, the gallbladder was removed   through the periumbilical port.  An EndoCatch was used.  The peritoneal cavity   irrigated and inspected.  The pneumoperitoneum released.  The fascia of the   umbilicus closed with 0 Vicryl, the skin with running 4-0 Monocryl.  The patient   tolerated the procedure well and left the Operating Room in good condition.      KAYDEN/IN  dd: 11/15/2018 11:13:28 (CST)  td: 11/15/2018 11:37:05 (CST)  Doc ID   #[id number]  Job ID #[id number]    CC:

## 2018-11-15 NOTE — INTERVAL H&P NOTE
The patient has been examined and the H&P has been reviewed:    I concur with the findings and no changes have occurred since H&P was written.            Active Hospital Problems    Diagnosis  POA    Gallbladder attack [K82.9]  Yes      Resolved Hospital Problems   No resolved problems to display.

## 2019-07-01 ENCOUNTER — OFFICE VISIT (OUTPATIENT)
Dept: OPTOMETRY | Facility: CLINIC | Age: 44
End: 2019-07-01
Payer: COMMERCIAL

## 2019-07-01 DIAGNOSIS — H52.203 MYOPIA OF BOTH EYES WITH ASTIGMATISM AND PRESBYOPIA: Primary | ICD-10-CM

## 2019-07-01 DIAGNOSIS — H52.13 MYOPIA OF BOTH EYES WITH ASTIGMATISM AND PRESBYOPIA: Primary | ICD-10-CM

## 2019-07-01 DIAGNOSIS — Z46.0 FITTING AND ADJUSTMENT OF SPECTACLES AND CONTACT LENSES: Primary | ICD-10-CM

## 2019-07-01 DIAGNOSIS — H52.4 MYOPIA OF BOTH EYES WITH ASTIGMATISM AND PRESBYOPIA: Primary | ICD-10-CM

## 2019-07-01 PROCEDURE — 92012 PR EYE EXAM, EST PATIENT,INTERMED: ICD-10-PCS | Mod: S$GLB,,, | Performed by: OPTOMETRIST

## 2019-07-01 PROCEDURE — 92015 PR REFRACTION: ICD-10-PCS | Mod: S$GLB,,, | Performed by: OPTOMETRIST

## 2019-07-01 PROCEDURE — 92012 INTRM OPH EXAM EST PATIENT: CPT | Mod: S$GLB,,, | Performed by: OPTOMETRIST

## 2019-07-01 PROCEDURE — 99999 PR PBB SHADOW E&M-EST. PATIENT-LVL II: CPT | Mod: PBBFAC,,, | Performed by: OPTOMETRIST

## 2019-07-01 PROCEDURE — 99999 PR PBB SHADOW E&M-EST. PATIENT-LVL II: ICD-10-PCS | Mod: PBBFAC,,, | Performed by: OPTOMETRIST

## 2019-07-01 PROCEDURE — 92310 CONTACT LENS FITTING OU: CPT | Mod: S$GLB,,, | Performed by: OPTOMETRIST

## 2019-07-01 PROCEDURE — 92310 PR CONTACT LENS FITTING (NO CHANGE): ICD-10-PCS | Mod: S$GLB,,, | Performed by: OPTOMETRIST

## 2019-07-01 PROCEDURE — 92015 DETERMINE REFRACTIVE STATE: CPT | Mod: S$GLB,,, | Performed by: OPTOMETRIST

## 2019-07-01 NOTE — PROGRESS NOTES
HPI     DLS: 8/2/18  Pt states no [facility] problems, getting low on contacts --wears daily disp w no   problems  No f/f  No gtts   Hx LASIK OU    Last edited by Kolby Camacho, OD on 7/1/2019  9:59 AM. (History)        ROS     Positive for: Eyes (LASIK OU)    Negative for: Constitutional, Gastrointestinal, Neurological, Skin,   Genitourinary, Musculoskeletal, HENT, Endocrine, Cardiovascular,   Respiratory, Psychiatric, Allergic/Imm, Heme/Lymph    Last edited by Kolby Camacho, OD on 7/1/2019  9:59 AM. (History)        Assessment /Plan     For exam results, see Encounter Report.    Myopia of both eyes with astigmatism and presbyopia      1. Residual myopia/astig/presby sp LASIK 15 years ago.  Pt wearing ONE DAY AV MOIST ASTIG Cls w good fit/VA.  Wears otc readers when needed  2. Pt wishes to defer DFE today due to work issues    PLAN:    1. Wrote spex/CLRx  2. Cont DW sched--exchange nightly  3.  rtc 1 yr

## 2019-07-03 ENCOUNTER — TELEPHONE (OUTPATIENT)
Dept: SPORTS MEDICINE | Facility: CLINIC | Age: 44
End: 2019-07-03

## 2019-07-09 ENCOUNTER — TELEPHONE (OUTPATIENT)
Dept: SPORTS MEDICINE | Facility: CLINIC | Age: 44
End: 2019-07-09

## 2019-07-09 NOTE — TELEPHONE ENCOUNTER
Spoke with patient to see which shoulder he is having pain in. Patient stated he is having right shoulder pain.

## 2019-07-18 ENCOUNTER — OFFICE VISIT (OUTPATIENT)
Dept: SPORTS MEDICINE | Facility: CLINIC | Age: 44
End: 2019-07-18
Payer: COMMERCIAL

## 2019-07-18 ENCOUNTER — HOSPITAL ENCOUNTER (OUTPATIENT)
Dept: RADIOLOGY | Facility: HOSPITAL | Age: 44
Discharge: HOME | End: 2019-07-18
Attending: PHYSICIAN ASSISTANT
Payer: COMMERCIAL

## 2019-07-18 VITALS
HEART RATE: 98 BPM | HEIGHT: 68 IN | DIASTOLIC BLOOD PRESSURE: 80 MMHG | WEIGHT: 178 LBS | BODY MASS INDEX: 26.98 KG/M2 | SYSTOLIC BLOOD PRESSURE: 134 MMHG

## 2019-07-18 DIAGNOSIS — M25.511 RIGHT SHOULDER PAIN, UNSPECIFIED CHRONICITY: ICD-10-CM

## 2019-07-18 DIAGNOSIS — M75.21 BICEPS TENDINITIS OF RIGHT UPPER EXTREMITY: Primary | ICD-10-CM

## 2019-07-18 PROCEDURE — 3008F BODY MASS INDEX DOCD: CPT | Mod: CPTII,S$GLB,, | Performed by: PHYSICIAN ASSISTANT

## 2019-07-18 PROCEDURE — 99999 PR PBB SHADOW E&M-EST. PATIENT-LVL III: ICD-10-PCS | Mod: PBBFAC,,, | Performed by: PHYSICIAN ASSISTANT

## 2019-07-18 PROCEDURE — 97110 PR THERAPEUTIC EXERCISES: ICD-10-PCS | Mod: S$GLB,,, | Performed by: PHYSICIAN ASSISTANT

## 2019-07-18 PROCEDURE — 73030 X-RAY EXAM OF SHOULDER: CPT | Mod: TC,FY,PO,RT

## 2019-07-18 PROCEDURE — 99204 PR OFFICE/OUTPT VISIT, NEW, LEVL IV, 45-59 MIN: ICD-10-PCS | Mod: S$GLB,,, | Performed by: PHYSICIAN ASSISTANT

## 2019-07-18 PROCEDURE — 73030 XR SHOULDER COMPLETE 2 OR MORE VIEWS RIGHT: ICD-10-PCS | Mod: 26,RT,, | Performed by: RADIOLOGY

## 2019-07-18 PROCEDURE — 97110 THERAPEUTIC EXERCISES: CPT | Mod: S$GLB,,, | Performed by: PHYSICIAN ASSISTANT

## 2019-07-18 PROCEDURE — 3008F PR BODY MASS INDEX (BMI) DOCUMENTED: ICD-10-PCS | Mod: CPTII,S$GLB,, | Performed by: PHYSICIAN ASSISTANT

## 2019-07-18 PROCEDURE — 99204 OFFICE O/P NEW MOD 45 MIN: CPT | Mod: S$GLB,,, | Performed by: PHYSICIAN ASSISTANT

## 2019-07-18 PROCEDURE — 73030 X-RAY EXAM OF SHOULDER: CPT | Mod: 26,RT,, | Performed by: RADIOLOGY

## 2019-07-18 PROCEDURE — 99999 PR PBB SHADOW E&M-EST. PATIENT-LVL III: CPT | Mod: PBBFAC,,, | Performed by: PHYSICIAN ASSISTANT

## 2019-07-18 RX ORDER — MELOXICAM 15 MG/1
15 TABLET ORAL DAILY
Qty: 30 TABLET | Refills: 2 | Status: SHIPPED | OUTPATIENT
Start: 2019-07-18 | End: 2022-06-22

## 2019-07-18 NOTE — PROGRESS NOTES
Subjective:          Chief Complaint: Gautam Ross is a 44 y.o. male who had concerns including Pain of the Right Shoulder.    Gautam Ross is a right handed  and does weight training.The gradual pain started after alternating dumbbell shoulder press 1 month ago and has not resolved since. Pain mostly after upper body pushing split. Pain is located over (points to) anterior arm radiating down bicep muscle. He reports that the pain is a 4 /10 aching pain today and not responding adequately to conservative measures which have included activity modifications, rest, and oral medication. Is affecting ADLs and limiting desired level of activity. Denies numbness, tingling, radiation, and neck pain or radicular symptoms.  Pain is 5 /10 at its worst    Mechanical symptoms: none  Subjective instability: -    Worse with overhead activities  Better with rest.   Nocturnal symptoms: +    No previous surgeries or trauma on shoulder              Review of Systems   Constitution: Negative for chills and fever.   HENT: Negative for congestion and sore throat.    Eyes: Negative for discharge and double vision.   Cardiovascular: Negative for chest pain, palpitations and syncope.   Respiratory: Negative for cough and shortness of breath.    Endocrine: Negative for cold intolerance and heat intolerance.   Skin: Negative for dry skin and rash.   Musculoskeletal: Positive for joint pain. Negative for falls, gout, joint swelling, muscle cramps, muscle weakness and neck pain.   Gastrointestinal: Negative for abdominal pain, nausea and vomiting.   Neurological: Negative for focal weakness, numbness and paresthesias.                   Objective:        General: Gautam is well-developed, well-nourished, appears stated age, in no acute distress, alert and oriented to time, place and person.     General    Vitals reviewed.  Constitutional: He is oriented to person, place, and time. He appears well-developed and  well-nourished. No distress.   HENT:   Head: Normocephalic and atraumatic.   Nose: Nose normal.   Eyes: Conjunctivae and EOM are normal. Pupils are equal, round, and reactive to light.   Cardiovascular: Normal rate, regular rhythm and intact distal pulses.    Pulmonary/Chest: Effort normal and breath sounds normal.   Abdominal: Soft. Bowel sounds are normal. He exhibits no distension.   Neurological: He is alert and oriented to person, place, and time. He has normal reflexes.   Psychiatric: He has a normal mood and affect. His behavior is normal. Judgment and thought content normal.         Right Shoulder Exam     Inspection/Observation   Swelling: absent  Bruising: absent  Scars: absent  Deformity: absent  Scapular Winging: absent  Scapular Dyskinesia: negative  Atrophy: absent    Tenderness   The patient is tender to palpation of the biceps tendon.    Range of Motion   Active abduction: 150   Passive abduction: 150   Extension: 50   Forward Flexion: 180   Forward Elevation: 180Adduction: 30   External Rotation 0 degrees: 70   Internal rotation 0 degrees: T6   Internal rotation 90 degrees: 90     Tests & Signs   Apprehension: negative  Cross arm: negative  Drop arm: negative  Luz test: negative  Impingement: negative  Lift Off Sign: negative  Belly Press: negative  Active Compression Test ([place]'s Sign): negative  Yergason's Test: positive  Speed's Test: positive  Relocation 90 degrees: negative  Jerk Test: negative    Other   Sensation: normal    Left Shoulder Exam     Inspection/Observation   Swelling: absent  Bruising: absent  Scars: absent  Deformity: absent  Scapular Winging: absent  Scapular Dyskinesia: negative  Atrophy: absent    Range of Motion   Active abduction: 150   Passive abduction: 150   Extension: 50   Forward Flexion: 180   Forward Elevation: 180Adduction: 30   External Rotation 0 degrees: 70   Internal rotation 0 degrees: T6   Internal rotation 90 degrees: 90     Tests & Signs    Apprehension: negative  Cross arm: negative  Drop arm: negative  Luz test: negative  Impingement: negative  Lift Off Sign: negative  Belly Press: negative  Active Compression test (Delta's Sign): negative  Yergasons's Test: negative  Speed's Test: negative  Relocation 90 degrees: negative  Jerk Test: negative    Other   Sensation: normal       Muscle Strength   Right Upper Extremity   Shoulder Abduction: 5/5   Shoulder Internal Rotation: 5/5   Shoulder External Rotation: 5/5   Supraspinatus: 5/5/5   Subscapularis: 5/5/5   Biceps: 4/5/5   Left Upper Extremity  Shoulder Abduction: 5/5   Shoulder Internal Rotation: 5/5   Shoulder External Rotation: 5/5   Supraspinatus: 5/5/5   Subscapularis: 5/5/5   Biceps: 5/5/5     Vascular Exam     Right Pulses      Radial:                    2+      Left Pulses      Radial:                    2+      Capillary Refill  Right Hand: normal capillary refill  Left Hand: normal capillary refill    Radiographic Findings 07/18/2019:    No fracture, subluxation, or other significant bony or joint abnormality was identified. Bony alignment is normal. Joints and soft tissues are unremarkable.     Xrays of the right shoulder were ordered and reviewed by me today. These findings were discussed and reviewed with the patient.        Assessment:       Encounter Diagnoses   Name Primary?    Biceps tendinitis of right upper extremity Yes    Right shoulder pain, unspecified chronicity           Plan:        We have discussed a variety of treatment options including medications, injections, physical therapy and other alternative treatments. I also explained the indications, risks and benefits of surgery. Given the patients hx and examination today, I believe she would benefit from physical therapy or HEP. Pt agrees and would like to proceed with HEP.    I made the decision to obtain old records of the patient including previous notes and imaging. I independently reviewed and interpreted lab  results today as well as prior imaging.     1. Ice compress to the affected area 2-3x a day for 15-20 minutes as needed for pain management.  2. Mobic 15 mg 1 time daily PRN for pain management. Patient understands to take with food and/or OTC prilosec to decrease GI side effects.  3. Activity modification discussed today. Avoid bench press and overhead press for 2-3 weeks. Progressive program with neutral position. Consider MRI if symptoms worsen.  4. RTC to see Fabricio Watters PA-C in 6 weeks for follow-up.    All of the patient's questions were answered and the patient will contact us if they have any questions or concerns in the interim.                      Patient questionnaires may have been collected.

## 2019-07-18 NOTE — LETTER
July 18, 2019      Rajwinder Lim MD  [address]  [place] [address]           [facility]  [address]  [facility] [address]  Phone: [phone]          Patient: Gautam Ross   MR Number: [id number]   YOB: 1975   Date of Visit: 7/18/2019       Dear Dr. Rajwinder Lim:    Thank you for referring Gautam Ross to me for evaluation. Attached you will find relevant portions of my assessment and plan of care.    If you have questions, please do not hesitate to call me. I look forward to following Gautam Ross along with you.    Sincerely,    Dain Watters PA-C    Enclosure  CC:  No Recipients    If you would like to receive this communication electronically, please contact [email or web address] or [phone] to request more information on [employer] Link access.    For providers and/or their staff who would like to refer a patient to [facility], please contact us through our one-stop-shop provider referral line, [facility], at [phone].    If you feel you have received this communication in error or would no longer like to receive these types of communications, please e-mail [email or web address]

## 2019-08-01 ENCOUNTER — OFFICE VISIT (OUTPATIENT)
Dept: FAMILY MEDICINE | Facility: CLINIC | Age: 44
End: 2019-08-01
Attending: FAMILY MEDICINE
Payer: COMMERCIAL

## 2019-08-01 VITALS
BODY MASS INDEX: 29.02 KG/M2 | SYSTOLIC BLOOD PRESSURE: 132 MMHG | DIASTOLIC BLOOD PRESSURE: 86 MMHG | HEART RATE: 65 BPM | WEIGHT: 191.5 LBS | OXYGEN SATURATION: 96 % | HEIGHT: 68 IN

## 2019-08-01 DIAGNOSIS — Z00.00 ANNUAL PHYSICAL EXAM: Primary | ICD-10-CM

## 2019-08-01 LAB
BILIRUB SERPL-MCNC: NORMAL MG/DL
BLOOD URINE, POC: NORMAL
COLOR, POC UA: YELLOW
GLUCOSE UR QL STRIP: NORMAL
KETONES UR QL STRIP: NORMAL
LEUKOCYTE ESTERASE URINE, POC: NORMAL
NITRITE, POC UA: NORMAL
PH, POC UA: 5
PROTEIN, POC: NORMAL
SPECIFIC GRAVITY, POC UA: 1.01
UROBILINOGEN, POC UA: NORMAL

## 2019-08-01 PROCEDURE — 90715 TDAP VACCINE GREATER THAN OR EQUAL TO 7YO IM: ICD-10-PCS | Mod: S$GLB,,, | Performed by: FAMILY MEDICINE

## 2019-08-01 PROCEDURE — 81001 POCT URINALYSIS, DIPSTICK OR TABLET REAGENT, AUTOMATED, WITH MICROSCOP: ICD-10-PCS | Mod: S$GLB,,, | Performed by: FAMILY MEDICINE

## 2019-08-01 PROCEDURE — 99999 PR PBB SHADOW E&M-EST. PATIENT-LVL III: ICD-10-PCS | Mod: PBBFAC,,, | Performed by: FAMILY MEDICINE

## 2019-08-01 PROCEDURE — 81001 URINALYSIS AUTO W/SCOPE: CPT | Mod: S$GLB,,, | Performed by: FAMILY MEDICINE

## 2019-08-01 PROCEDURE — 99999 PR PBB SHADOW E&M-EST. PATIENT-LVL III: CPT | Mod: PBBFAC,,, | Performed by: FAMILY MEDICINE

## 2019-08-01 PROCEDURE — 99396 PR PREVENTIVE VISIT,EST,40-64: ICD-10-PCS | Mod: 25,S$GLB,, | Performed by: FAMILY MEDICINE

## 2019-08-01 PROCEDURE — 90471 TDAP VACCINE GREATER THAN OR EQUAL TO 7YO IM: ICD-10-PCS | Mod: S$GLB,,, | Performed by: FAMILY MEDICINE

## 2019-08-01 PROCEDURE — 90715 TDAP VACCINE 7 YRS/> IM: CPT | Mod: S$GLB,,, | Performed by: FAMILY MEDICINE

## 2019-08-01 PROCEDURE — 90471 IMMUNIZATION ADMIN: CPT | Mod: S$GLB,,, | Performed by: FAMILY MEDICINE

## 2019-08-01 PROCEDURE — 99396 PREV VISIT EST AGE 40-64: CPT | Mod: 25,S$GLB,, | Performed by: FAMILY MEDICINE

## 2019-08-01 NOTE — PATIENT INSTRUCTIONS
Gautam,     We are always striving for excellence. Should you receive a patient experience survey electronically or by mail, we would appreciate if you would take a few moments to give us your feedback. These surveys let us know our strengths as well as areas of opportunity for improvement to better serve you.    Thank you for your time,  Jonathon White MA    Your test results will be communicated to you via : [facility], Telephone or Letter.   If you have not received your test results in one week, please contact the clinic at [phone].

## 2019-08-02 ENCOUNTER — LAB VISIT (OUTPATIENT)
Dept: LAB | Facility: HOSPITAL | Age: 44
End: 2019-08-02
Attending: FAMILY MEDICINE
Payer: COMMERCIAL

## 2019-08-02 DIAGNOSIS — Z00.00 ANNUAL PHYSICAL EXAM: ICD-10-CM

## 2019-08-02 LAB
ALBUMIN SERPL BCP-MCNC: 3.8 G/DL (ref 3.5–5.2)
ALP SERPL-CCNC: 86 U/L (ref 55–135)
ALT SERPL W/O P-5'-P-CCNC: 24 U/L (ref 10–44)
ANION GAP SERPL CALC-SCNC: 8 MMOL/L (ref 8–16)
AST SERPL-CCNC: 19 U/L (ref 10–40)
BASOPHILS # BLD AUTO: 0.03 K/UL (ref 0–0.2)
BASOPHILS NFR BLD: 0.6 % (ref 0–1.9)
BILIRUB SERPL-MCNC: 0.7 MG/DL (ref 0.1–1)
BUN SERPL-MCNC: 22 MG/DL (ref 6–20)
CALCIUM SERPL-MCNC: 9.5 MG/DL (ref 8.7–10.5)
CHLORIDE SERPL-SCNC: 105 MMOL/L (ref 95–110)
CHOLEST SERPL-MCNC: 174 MG/DL (ref 120–199)
CHOLEST/HDLC SERPL: 4 {RATIO} (ref 2–5)
CO2 SERPL-SCNC: 27 MMOL/L (ref 23–29)
CREAT SERPL-MCNC: 1 MG/DL (ref 0.5–1.4)
DIFFERENTIAL METHOD: NORMAL
EOSINOPHIL # BLD AUTO: 0.2 K/UL (ref 0–0.5)
EOSINOPHIL NFR BLD: 3.4 % (ref 0–8)
ERYTHROCYTE [DISTWIDTH] IN BLOOD BY AUTOMATED COUNT: 13.2 % (ref 11.5–14.5)
EST. GFR  (AFRICAN AMERICAN): >60 ML/MIN/1.73 M^2
EST. GFR  (NON AFRICAN AMERICAN): >60 ML/MIN/1.73 M^2
GLUCOSE SERPL-MCNC: 100 MG/DL (ref 70–110)
HCT VFR BLD AUTO: 43.8 % (ref 40–54)
HDLC SERPL-MCNC: 43 MG/DL (ref 40–75)
HDLC SERPL: 24.7 % (ref 20–50)
HGB BLD-MCNC: 14.2 G/DL (ref 14–18)
IMM GRANULOCYTES # BLD AUTO: 0.01 K/UL (ref 0–0.04)
IMM GRANULOCYTES NFR BLD AUTO: 0.2 % (ref 0–0.5)
LDLC SERPL CALC-MCNC: 114.8 MG/DL (ref 63–159)
LYMPHOCYTES # BLD AUTO: 1.7 K/UL (ref 1–4.8)
LYMPHOCYTES NFR BLD: 33 % (ref 18–48)
MCH RBC QN AUTO: 27.8 PG (ref 27–31)
MCHC RBC AUTO-ENTMCNC: 32.4 G/DL (ref 32–36)
MCV RBC AUTO: 86 FL (ref 82–98)
MONOCYTES # BLD AUTO: 0.5 K/UL (ref 0.3–1)
MONOCYTES NFR BLD: 10 % (ref 4–15)
NEUTROPHILS # BLD AUTO: 2.8 K/UL (ref 1.8–7.7)
NEUTROPHILS NFR BLD: 52.8 % (ref 38–73)
NONHDLC SERPL-MCNC: 131 MG/DL
NRBC BLD-RTO: 0 /100 WBC
PLATELET # BLD AUTO: 177 K/UL (ref 150–350)
PMV BLD AUTO: 11.9 FL (ref 9.2–12.9)
POTASSIUM SERPL-SCNC: 4.4 MMOL/L (ref 3.5–5.1)
PROT SERPL-MCNC: 7.1 G/DL (ref 6–8.4)
RBC # BLD AUTO: 5.11 M/UL (ref 4.6–6.2)
SODIUM SERPL-SCNC: 140 MMOL/L (ref 136–145)
TRIGL SERPL-MCNC: 81 MG/DL (ref 30–150)
TSH SERPL DL<=0.005 MIU/L-ACNC: 1.63 UIU/ML (ref 0.4–4)
WBC # BLD AUTO: 5.22 K/UL (ref 3.9–12.7)

## 2019-08-02 PROCEDURE — 85025 COMPLETE CBC W/AUTO DIFF WBC: CPT

## 2019-08-02 PROCEDURE — 36415 COLL VENOUS BLD VENIPUNCTURE: CPT | Mod: PO

## 2019-08-02 PROCEDURE — 84443 ASSAY THYROID STIM HORMONE: CPT

## 2019-08-02 PROCEDURE — 80053 COMPREHEN METABOLIC PANEL: CPT

## 2019-08-02 PROCEDURE — 80061 LIPID PANEL: CPT

## 2019-08-06 NOTE — PROGRESS NOTES
Subjective:       Patient ID: Gautam Ross is a 44 y.o. male.    Chief Complaint: Annual Exam    HPI   Pt is here for annual exam pt is well no sob/cp no change in bowel habits no brbpr pt denies dysuria hematuria   Review of Systems   Constitutional: Negative for activity change, chills, fatigue and fever.   HENT: Negative for congestion, ear pain, hearing loss, postnasal drip, rhinorrhea, sinus pressure and sore throat.    Eyes: Negative for photophobia, pain, redness and visual disturbance.   Respiratory: Negative for cough, chest tightness and shortness of breath.    Cardiovascular: Negative for chest pain, palpitations and leg swelling.   Gastrointestinal: Negative for abdominal pain, blood in stool, constipation, diarrhea, nausea and vomiting.   Genitourinary: Negative for decreased urine volume, difficulty urinating, discharge, dysuria, frequency and urgency.   Musculoskeletal: Negative for back pain, joint swelling and neck pain.   Skin: Negative for color change, pallor and rash.   Neurological: Negative for dizziness, seizures, speech difficulty and numbness.   Hematological: Does not bruise/bleed easily.   Psychiatric/Behavioral: Negative for behavioral problems, confusion, decreased concentration and suicidal ideas.       Objective:      Physical Exam   Constitutional: He is oriented to person, place, and time. He appears well-developed and well-nourished. No distress.   HENT:   Head: Normocephalic and atraumatic.   Nose: Nose normal.   Mouth/Throat: Oropharynx is clear and moist.   Eyes: Pupils are equal, round, and reactive to light. EOM are normal.   Neck: Normal range of motion. Neck supple. No thyromegaly present.   Cardiovascular: Normal rate and regular rhythm. Exam reveals no gallop.   Pulmonary/Chest: Effort normal and breath sounds normal. No respiratory distress.   Abdominal: Soft. Bowel sounds are normal. He exhibits no distension. There is no tenderness. There is no rebound and no  "guarding.   Genitourinary:   Genitourinary Comments: declined   Musculoskeletal: Normal range of motion. He exhibits no edema or tenderness.   Neurological: He is alert and oriented to person, place, and time. No cranial nerve deficit. Coordination normal.   Skin: Skin is warm and dry. No erythema.   Psychiatric: He has a normal mood and affect. His behavior is normal. Judgment and thought content normal.       Assessment:       1. Annual physical exam        Plan:     orders cmp lipid tsh urine cbc  Cont meds  Low salt low at diet  Graded exercise  rtc annually and prn    Health maintenance  Lipid ordered  Flu in fall  Tetanus q 10 years         "This note will not be shared with the patient."   "

## 2019-08-26 ENCOUNTER — OFFICE VISIT (OUTPATIENT)
Dept: SPORTS MEDICINE | Facility: CLINIC | Age: 44
End: 2019-08-26
Payer: COMMERCIAL

## 2019-08-26 VITALS
HEIGHT: 68 IN | DIASTOLIC BLOOD PRESSURE: 66 MMHG | WEIGHT: 191 LBS | HEART RATE: 74 BPM | BODY MASS INDEX: 28.95 KG/M2 | SYSTOLIC BLOOD PRESSURE: 113 MMHG

## 2019-08-26 DIAGNOSIS — M25.511 RIGHT SHOULDER PAIN, UNSPECIFIED CHRONICITY: ICD-10-CM

## 2019-08-26 DIAGNOSIS — M75.21 BICEPS TENDINITIS OF RIGHT UPPER EXTREMITY: Primary | ICD-10-CM

## 2019-08-26 PROCEDURE — 99999 PR PBB SHADOW E&M-EST. PATIENT-LVL III: ICD-10-PCS | Mod: PBBFAC,,, | Performed by: PHYSICIAN ASSISTANT

## 2019-08-26 PROCEDURE — 99999 PR PBB SHADOW E&M-EST. PATIENT-LVL III: CPT | Mod: PBBFAC,,, | Performed by: PHYSICIAN ASSISTANT

## 2019-08-26 PROCEDURE — 99213 OFFICE O/P EST LOW 20 MIN: CPT | Mod: S$GLB,,, | Performed by: PHYSICIAN ASSISTANT

## 2019-08-26 PROCEDURE — 3008F PR BODY MASS INDEX (BMI) DOCUMENTED: ICD-10-PCS | Mod: CPTII,S$GLB,, | Performed by: PHYSICIAN ASSISTANT

## 2019-08-26 PROCEDURE — 3008F BODY MASS INDEX DOCD: CPT | Mod: CPTII,S$GLB,, | Performed by: PHYSICIAN ASSISTANT

## 2019-08-26 PROCEDURE — 99213 PR OFFICE/OUTPT VISIT, EST, LEVL III, 20-29 MIN: ICD-10-PCS | Mod: S$GLB,,, | Performed by: PHYSICIAN ASSISTANT

## 2019-08-26 NOTE — PROGRESS NOTES
Subjective:          Chief Complaint: Gautam Ross is a 44 y.o. male who had no chief complaint listed for this encounter.    Gautam Ross is a right handed  and does weight training.    Interval Hx: Pt reports moderate relief with rest. Mild intermittent pain still present. Reports rock climbing in [place] with pain following. Performing HEP and mobic intermittently.    Previous HPI: The gradual pain started after alternating dumbbell shoulder press 1 month ago and has not resolved since. Pain mostly after upper body pushing split. Pain is located over (points to) anterior arm radiating down bicep muscle. He reports that the pain is a 4 /10 aching pain today and not responding adequately to conservative measures which have included activity modifications, rest, and oral medication. Is affecting ADLs and limiting desired level of activity. Denies numbness, tingling, radiation, and neck pain or radicular symptoms.  Pain is 5 /10 at its worst    Mechanical symptoms: none  Subjective instability: -    Worse with overhead activities  Better with rest.   Nocturnal symptoms: +    No previous surgeries or trauma on shoulder              Review of Systems   Constitution: Negative for chills and fever.   HENT: Negative for congestion and sore throat.    Eyes: Negative for discharge and double vision.   Cardiovascular: Negative for chest pain, palpitations and syncope.   Respiratory: Negative for cough and shortness of breath.    Endocrine: Negative for cold intolerance and heat intolerance.   Skin: Negative for dry skin and rash.   Musculoskeletal: Positive for joint pain. Negative for falls, gout, joint swelling, muscle cramps, muscle weakness and neck pain.   Gastrointestinal: Negative for abdominal pain, nausea and vomiting.   Neurological: Negative for focal weakness, numbness and paresthesias.                   Objective:        General: Gautam is well-developed, well-nourished, appears  stated age, in no acute distress, alert and oriented to time, place and person.     General    Vitals reviewed.  Constitutional: He is oriented to person, place, and time. He appears well-developed and well-nourished. No distress.   HENT:   Head: Normocephalic and atraumatic.   Nose: Nose normal.   Eyes: Conjunctivae and EOM are normal. Pupils are equal, round, and reactive to light.   Cardiovascular: Normal rate, regular rhythm and intact distal pulses.    Pulmonary/Chest: Effort normal and breath sounds normal.   Abdominal: Soft. Bowel sounds are normal. He exhibits no distension.   Neurological: He is alert and oriented to person, place, and time. He has normal reflexes.   Psychiatric: He has a normal mood and affect. His behavior is normal. Judgment and thought content normal.         Right Shoulder Exam     Inspection/Observation   Swelling: absent  Bruising: absent  Scars: absent  Deformity: absent  Scapular Winging: absent  Scapular Dyskinesia: positive  Atrophy: absent    Tenderness   The patient is tender to palpation of the biceps tendon.    Range of Motion   Active abduction: 150   Passive abduction: 150   Extension: 50   Forward Flexion: 180   Forward Elevation: 180Adduction: 30   External Rotation 0 degrees: 70   Internal rotation 0 degrees: T6   Internal rotation 90 degrees: 90     Tests & Signs   Apprehension: negative  Cross arm: negative  Drop arm: negative  Luz test: negative  Impingement: negative  Lift Off Sign: negative  Belly Press: negative  Active Compression Test (Carlton's Sign): negative  Yergason's Test: positive  Speed's Test: positive  Relocation 90 degrees: negative  Jerk Test: negative    Other   Sensation: normal    Left Shoulder Exam     Inspection/Observation   Swelling: absent  Bruising: absent  Scars: absent  Deformity: absent  Scapular Winging: absent  Scapular Dyskinesia: negative  Atrophy: absent    Range of Motion   Active abduction: 150   Passive abduction: 150    Extension: 50   Forward Flexion: 180   Forward Elevation: 180Adduction: 30   External Rotation 0 degrees: 70   Internal rotation 0 degrees: T6   Internal rotation 90 degrees: 90     Tests & Signs   Apprehension: negative  Cross arm: negative  Drop arm: negative  Luz test: negative  Impingement: negative  Lift Off Sign: negative  Belly Press: negative  Active Compression test (Davenport's Sign): negative  Yergasons's Test: negative  Speed's Test: negative  Relocation 90 degrees: negative  Jerk Test: negative    Other   Sensation: normal       Muscle Strength   Right Upper Extremity   Shoulder Abduction: 5/5   Shoulder Internal Rotation: 5/5   Shoulder External Rotation: 5/5   Supraspinatus: 5/5/5   Subscapularis: 5/5/5   Biceps: 4/5/5   Left Upper Extremity  Shoulder Abduction: 5/5   Shoulder Internal Rotation: 5/5   Shoulder External Rotation: 5/5   Supraspinatus: 5/5/5   Subscapularis: 5/5/5   Biceps: 5/5/5     Vascular Exam     Right Pulses      Radial:                    2+      Left Pulses      Radial:                    2+      Capillary Refill  Right Hand: normal capillary refill  Left Hand: normal capillary refill    Radiographic Findings:    No fracture, subluxation, or other significant bony or joint abnormality was identified. Bony alignment is normal. Joints and soft tissues are unremarkable.     Xrays of the right shoulder were reviewed by me today. These findings were discussed and reviewed with the patient.        Assessment:       Encounter Diagnoses   Name Primary?    Biceps tendinitis of right upper extremity Yes    Right shoulder pain, unspecified chronicity           Plan:        We have discussed a variety of treatment options including medications, injections, physical therapy and other alternative treatments. I also explained the indications, risks and benefits of surgery. Given the patients hx and examination today, I believe she would benefit from physical therapy or HEP. Pt agrees and  would like to proceed with physical therapy at this time.    I made the decision to obtain old records of the patient including previous notes and imaging. I independently reviewed and interpreted lab results today as well as prior imaging.     1. Ice compress to the affected area 2-3x a day for 15-20 minutes as needed for pain management.  2. Continue Mobic 15 mg 1 time daily PRN for pain management. Patient understands to take with food and/or OTC prilosec to decrease GI side effects.  3. Ambulatory referral to physical therapy for upperbody overhead weight training technique with periscapular protocol.  4. RTC to see Fabricio Watters PA-C in 6 weeks for follow-up.    All of the patient's questions were answered and the patient will contact us if they have any questions or concerns in the interim.                      Patient questionnaires may have been collected.

## 2019-09-16 ENCOUNTER — CLINICAL SUPPORT (OUTPATIENT)
Dept: REHABILITATION | Facility: HOSPITAL | Age: 44
End: 2019-09-16
Attending: PHYSICIAN ASSISTANT
Payer: COMMERCIAL

## 2019-09-16 DIAGNOSIS — M25.511 ACUTE PAIN OF RIGHT SHOULDER: ICD-10-CM

## 2019-09-16 PROCEDURE — 97161 PT EVAL LOW COMPLEX 20 MIN: CPT

## 2019-09-16 PROCEDURE — 97110 THERAPEUTIC EXERCISES: CPT

## 2019-09-16 NOTE — PLAN OF CARE
[facility]  Physical Therapy Initial Evaluation    Name: Gautam Ross  Clinic Number: [id number]    Therapy Diagnosis:   Encounter Diagnosis   Name Primary?    Acute pain of right shoulder      Physician: Dain Watters, *    Physician Orders: PT Eval and Treat  Medical Diagnosis from Referral:   M75.21 (ICD-10-CM) - Biceps tendinitis of right upper extremity   M25.511 (ICD-10-CM) - Right shoulder pain, unspecified chronicity     Evaluation Date: 9/16/2019  Authorization Period Expiration: 12/31/2019  Plan of Care Expiration: 11/11/2019  Visit # / Visits authorized: 1/ 20    Time In: 0810  Time Out: 0850  Total Billable Time: 40 minutes    Precautions: Standard    Subjective     Date of onset: 2 months ago  History of current condition - Gautam reports: he was completing an exercises that included an alt OH press with DBs. Reports that he felt pain but was able to cont to workout. He has had pain ongoing since with certain movements. The pt reports inability to lift weight OH, bench or complete push-ups without pain. Pain sometimes wakes him up at night depending on the position of sleep. Also completes bow hunting and it bothers him during the drawing phase sometimes. Denies neck pain, denies N+T.      Imaging, X-ray: -    Prior Therapy: none  Exercise Routine/Sport Participation: Strength trainign 2x a week, cardio 2x a week. Active lifestyle.   Social History: Lives at home with family   Occupation:    Prior Level of Function: unrestricted  Current Level of Function: see above     Pain:  Current 1/10, worst 3/10, best 1/10   Location: R ant shoulder   Description: Aching  Aggravating Factors: see above  Easing Factors: rest    Pts goals: return to PLOF without pain       Medical History:   No past medical history on file.    Surgical History:   Gautam Ross  has a past surgical history that includes Umbilical hernia repair (10/16/13); LASIK (Bilateral);  Appendectomy; and Laparoscopic cholecystectomy (N/A, 11/15/2018).    Medications:   Gautam has a current medication list which includes the following prescription(s): fish oil-omega-3 fatty acids, fluticasone propionate, meloxicam, multivitamin, and UNABLE TO FIND.    Allergies:   Review of patient's allergies indicates:  No Known Allergies     Objective     Posture: thoracic kyphosis, scap downwardly rotated, humeral IR, normal humeral translation       Shoulder Elevation: IR B humerus, inadequate scap upward rotation, dec thoracic extension at end range.       Shoulder Active Range of Motion:   Shoulder Right Left   Flexion   170 180   ER at 0   50 50   Behind the back Reach   T12 T6   Scapula Upward Rotation 40 50      Shoulder Passive Range of Motion:   Shoulder Right Left   Flexion   180 180   ER at 0   60 60   ER at 90   90 90   IR   90 90     Cervical Range of Motion:    % Observation Pain   Flexion 100 normal no     Extension 75 Hinge lower cervical  no     Right Rotation 100  no     Left Rotation 100  no         Strength:   Right Left   Scaption 4+/5 5/5   Shoulder ER at side 4+/5 5/5   Shoulder ER at 90-90 4/5 4+/5   Shoulder IR at side  5/5 5/5   Deltoid 4+/5 5/5   Scapula Upward rotation force couple- UT/LT/SA (determined due to inability to reach full ROM) 3/5 3+/5         Special Tests:   Right Left   Luz- Bassam + -   Neer's - -   Full Can - -   Empty Can  + -   Supine Impingement  - -      Right Left   Drop Arm Test - -   ER Lag Sign - -   Bear Hug - -   Belly Press  - -     Joint Mobility: normal post GHJ nobility, hypomobile thoracic spine and scap     Palpation: ttp at bicep long head tendon.     Flexibility:   Post Cuff: R + ; L +   Lat: R + ; L -   Pec Minor: R + ; L +          CMS Impairment/Limitation/Restriction for FOTO Shoulder Survey    Therapist reviewed FOTO scores for Gautam Ross on 9/16/2019.   FOTO documents entered into [facility] - see Media section.    Limitation Score:  30%  Category: Mobility       Treatment     Treatment Time In: 0830  Treatment Time Out: 0845  Total Treatment time separate from Evaluation: 15 minutes    Gautam received therapeutic exercises to develop strength, endurance, ROM and posture for 12 minutes including:  Thoracic rotations open book- 10x B   ABCs on Wall flexion and horizontal ABC 2x ea.     Gautam received the following manual therapy techniques: Joint mobilizations were applied to the: thoracic spine for 2 minutes, including:  Thoracic grade V mob supine distraction     Home Exercises and Patient Education Provided     Education provided:   - No OH lifting, bench, push ups and flys for now.   - Prognosis, activity modification, goals for therapy, role of therapy for care, exercises/HEP    Written Home Exercises Provided: yes.  Exercises were reviewed and Gautam was able to demonstrate them prior to the end of the session.   Pt received a written copy of exercises to perform at home. Gautam demonstrated good  understanding of the education provided.     See EMR under patient instructions for exercises given.     Assessment     Gautam is a 44 y.o. male referred to outpatient Physical Therapy with R shoulder pain. The pt demonstrates mobility deficits of thoracic spine and dec periscap muscle activation elading to increased stress to GHJ causing pain and dec charlie to ADLs and IADLs.       Pt will benefit from skilled outpatient Physical Therapy to address the deficits stated above and in the chart below, provide pt/family education, and to maximize pt's level of independence. Pt prognosis is Excellent.     Plan of care discussed with patient: Yes  Pt's spiritual, cultural and educational needs considered and patient is agreeable to the plan of care and goals as stated below:       Anticipated Barriers for therapy: none      Medical Necessity is demonstrated by the following  History  Co-morbidities and personal factors that may impact the plan of care  Co-morbidities:   none    Personal Factors:   no deficits     low   Examination  Body Structures and Functions, activity limitations and participation restrictions that may impact the plan of care Body Regions:   upper extremities  trunk    Body Systems:    ROM  strength  motor control    Participation Restrictions:   none    Activity limitations:   Learning and applying knowledge  No deficit    General Tasks and Commands  No deficit    Communication  No deficit    Mobility  lifting and carrying objects  pushing, pulling    Self care  No deficit    Domestic Life  No deficit    Interactions/Relationships  No deficit    Life Areas  No deficit    Community and Social Life  No deficit          moderate   Clinical Presentation stable and uncomplicated low   Decision Making/ Complexity Score: low     Goals:  Short Term Goals: 2-4 weeks  1. Pt will be compliant with HEP 50% of prescribed amount.   2. The pt to demo improvement in R scap upward rotation to normal   3.  The pt to demo normal thoracic extension during OH press    Long Term Goals: 6-8 weeks   1. The pt to tolerate completing 95# bench press 5x without pain in shoulder and with efficient technique   2. The pt to tolerate pressing 15# DB OH without pain in shoulder 10x     3. The pt will report full participation in ADLs and IADLs without restrictions related to R shoulder.     Plan   Plan of care Certification: 9/16/2019 to 11/11/2019.    Outpatient Physical Therapy 2 times weekly for 8 weeks to include the following interventions: Manual Therapy, Neuromuscular Re-ed, Patient Education, Therapeutic Activites and Therapeutic Exercise.     Silvia Tan, PT , DPT, SCS, FAAMOMPT

## 2019-09-25 ENCOUNTER — CLINICAL SUPPORT (OUTPATIENT)
Dept: REHABILITATION | Facility: HOSPITAL | Age: 44
End: 2019-09-25
Attending: PHYSICIAN ASSISTANT
Payer: COMMERCIAL

## 2019-09-25 DIAGNOSIS — M25.511 ACUTE PAIN OF RIGHT SHOULDER: ICD-10-CM

## 2019-09-25 PROCEDURE — 97110 THERAPEUTIC EXERCISES: CPT

## 2019-09-25 PROCEDURE — 97112 NEUROMUSCULAR REEDUCATION: CPT

## 2019-09-25 NOTE — PROGRESS NOTES
Physical Therapy Daily Treatment Note     Name: Gautam Ross  Clinic Number: [id number]    Therapy Diagnosis:   Encounter Diagnosis   Name Primary?    Acute pain of right shoulder      Physician: Dain Watters, *    Visit Date: 9/25/2019  Physician Orders: PT Eval and Treat  Medical Diagnosis from Referral:   M75.21 (ICD-10-CM) - Biceps tendinitis of right upper extremity   M25.511 (ICD-10-CM) - Right shoulder pain, unspecified chronicity      Evaluation Date: 9/16/2019  Authorization Period Expiration: 12/31/2019  Plan of Care Expiration: 11/11/2019  Visit # / Visits authorized: 2/ 20     Time In: 0815  Time Out: 0900  Total Billable Time: 40 minutes         Subjective     Pt reports: Mild soreness in shoulder. Has been compliant with EHP.    He was compliant with home exercise program.  Response to previous treatment: no sig change  Functional change: no sig change     Pain: 2/10  Location: right shoulder      Objective     Daily Measurements: Unable to complete scap retraction without sig scap elevation.       Daily Treatment       Gautam received therapeutic exercises to develop strength, endurance and ROM for 15 minutes including:  Bike completed for 10 min to increase ROM, endurance and decrease pain to improve tolerance to ADLs and age related activities.   Wall Slides to Y 30x     Gautam participated in dynamic functional therapeutic activities to improve functional performance for 0  minutes, including:  None today    Gautam participated in neuromuscular re-education activities to improve: motor control, postural control for 30 minutes. The following activities were included:  ABCs on wall with red ball 5x forard, 5x, horizontal ADB   Scap squeeze mirror 5sec holds 3m total   Planks on table 3x30s     Home Exercises and Patient Education Provided     Education provided:   - yes    Written Home Exercises Provided: Patient instructed to cont prior HEP.  Exercises were reviewed and Gautam was  able to demonstrate them prior to the end of the session.  Gautam demonstrated good  understanding of the education provided.     See EMR under patient instructions for exercises given.     Assessment     The pt with sig difficulty with periscapular motor control in all directions. Required visual feedback, tactile and verbal cueing to ensure proper ms contraction and technique. The pt with fair to poor carryover. Cont to focus on periscapular motor control to improve dynamic postural control under load.     Gautam is progressing well towards his goals.     Pt will continue to benefit from skilled outpatient physical therapy to address the deficits listed in the problem list box on initial evaluation, provide pt/family education and to maximize pt's level of independence in the home and community environment. Pt prognosis is Good.     Pt's spiritual, cultural and educational needs considered and pt agreeable to plan of care and goals.    Anticipated barriers to physical therapy: None    Goals:  Short Term Goals: 2-4 weeks  1. Pt will be compliant with HEP 50% of prescribed amount.   2. The pt to demo improvement in R scap upward rotation to normal   3.  The pt to demo normal thoracic extension during OH press     Long Term Goals: 6-8 weeks   1. The pt to tolerate completing 95# bench press 5x without pain in shoulder and with efficient technique   2. The pt to tolerate pressing 15# DB OH without pain in shoulder 10x      3. The pt will report full participation in ADLs and IADLs without restrictions related to R shoulder.     Plan     Cont to focus on CKC exercises to improve periscapular control.     Silvia Tan, PT , DPT, SCS, FAAOMPT

## 2019-09-26 ENCOUNTER — CLINICAL SUPPORT (OUTPATIENT)
Dept: REHABILITATION | Facility: HOSPITAL | Age: 44
End: 2019-09-26
Attending: PHYSICIAN ASSISTANT
Payer: COMMERCIAL

## 2019-09-26 DIAGNOSIS — M25.511 ACUTE PAIN OF RIGHT SHOULDER: ICD-10-CM

## 2019-09-26 PROCEDURE — 97112 NEUROMUSCULAR REEDUCATION: CPT

## 2019-09-26 PROCEDURE — 97110 THERAPEUTIC EXERCISES: CPT

## 2019-09-26 NOTE — PROGRESS NOTES
Physical Therapy Daily Treatment Note     Name: Gautam Ross  Clinic Number: [id number]    Therapy Diagnosis:   Encounter Diagnosis   Name Primary?    Acute pain of right shoulder      Physician: Dain Watters, *    Visit Date: 9/26/2019  Physician Orders: PT Eval and Treat  Medical Diagnosis from Referral:   M75.21 (ICD-10-CM) - Biceps tendinitis of right upper extremity   M25.511 (ICD-10-CM) - Right shoulder pain, unspecified chronicity      Evaluation Date: 9/16/2019  Authorization Period Expiration: 12/31/2019  Plan of Care Expiration: 11/11/2019  Visit # / Visits authorized: 2/ 20     Time In: 0713  Time Out: 8:00  Total Billable Time: 31 minutes         Subjective     Pt reports: Shoulder feeling better than yesterday    He was compliant with home exercise program.  Response to previous treatment: no sig change  Functional change: no sig change     Pain: 0/10  Location: right shoulder      Objective     Daily Measurements: better controled scap stab      Daily Treatment       Gautam received therapeutic exercises to develop strength, endurance and ROM for 30 minutes including:  Bike completed for 10 min to increase ROM, endurance and decrease pain to improve tolerance to ADLs and age related activities.   Wall Slides to Y 30x   T/S ext on foam roll on wall  Sidelying T/S rot 30x (perform QPED next time)    Gautam participated in dynamic functional therapeutic activities to improve functional performance for 0  minutes, including:  None today    Gautam participated in neuromuscular re-education activities to improve: motor control, postural control for 17 minutes. The following activities were included:  ABCs on wall with red ball 5x forard, 5x, horizontal ADB   Scap squeeze mirror 5sec holds 3m total   Planks  3x30s     Home Exercises and Patient Education Provided     Education provided:   - yes    Written Home Exercises Provided: Patient instructed to cont prior HEP.  Exercises were reviewed  and Gautam was able to demonstrate them prior to the end of the session.  Gautam demonstrated good  understanding of the education provided.     See EMR under patient instructions for exercises given.     Assessment     Pt able to stab B scap w/ scap retractions w/ min V/TC.     Gautam is progressing well towards his goals.     Pt will continue to benefit from skilled outpatient physical therapy to address the deficits listed in the problem list box on initial evaluation, provide pt/family education and to maximize pt's level of independence in the home and community environment. Pt prognosis is Good.     Pt's spiritual, cultural and educational needs considered and pt agreeable to plan of care and goals.    Anticipated barriers to physical therapy: None    Goals:  Short Term Goals: 2-4 weeks  1. Pt will be compliant with HEP 50% of prescribed amount.   2. The pt to demo improvement in R scap upward rotation to normal   3.  The pt to demo normal thoracic extension during OH press     Long Term Goals: 6-8 weeks   1. The pt to tolerate completing 95# bench press 5x without pain in shoulder and with efficient technique   2. The pt to tolerate pressing 15# DB OH without pain in shoulder 10x      3. The pt will report full participation in ADLs and IADLs without restrictions related to R shoulder.     Plan     Cont to focus on CKC exercises to improve periscapular control.     Hemalatha Sellers PTA ,

## 2019-09-30 ENCOUNTER — CLINICAL SUPPORT (OUTPATIENT)
Dept: REHABILITATION | Facility: HOSPITAL | Age: 44
End: 2019-09-30
Attending: PHYSICIAN ASSISTANT
Payer: COMMERCIAL

## 2019-09-30 DIAGNOSIS — M25.511 ACUTE PAIN OF RIGHT SHOULDER: ICD-10-CM

## 2019-09-30 PROCEDURE — 97110 THERAPEUTIC EXERCISES: CPT

## 2019-09-30 PROCEDURE — 97530 THERAPEUTIC ACTIVITIES: CPT

## 2019-09-30 NOTE — PROGRESS NOTES
Physical Therapy Daily Treatment Note     Name: Gautam Ross  Clinic Number: [id number]    Therapy Diagnosis:   No diagnosis found.  Physician: Dain Watters, *    Visit Date: 9/30/2019  Physician Orders: PT Eval and Treat  Medical Diagnosis from Referral:   M75.21 (ICD-10-CM) - Biceps tendinitis of right upper extremity   M25.511 (ICD-10-CM) - Right shoulder pain, unspecified chronicity      Evaluation Date: 9/16/2019  Authorization Period Expiration: 12/31/2019  Plan of Care Expiration: 11/11/2019  Visit # / Visits authorized: 3/ 20     Time In: 2:05  Time Out: 2:56  Total Billable Time: 31 minutes         Subjective     Pt reports: shoulder a little sore from sleeping wrong on it.    He was compliant with home exercise program.  Response to previous treatment: no sig change  Functional change: no sig change     Pain: 0/10  Location: right shoulder      Objective     Daily Measurements: better controled scap stab      Daily Treatment       Gautam received therapeutic exercises to develop strength, endurance and ROM for 26 minutes including:  UBE completed for 10 min to increase ROM, endurance and decrease pain to improve tolerance to ADLs and age related activities.   Wall Slides to Y 30x   T/S ext on foam roll on wall   QPED T/S rot 30x   ER @ 90 w/ step back     Gautam participated in dynamic functional therapeutic activities to improve functional performance for 0  minutes, including:  None today    Gautam participated in neuromuscular re-education activities to improve: motor control, postural control for 25 minutes. The following activities were included:  ABCs on wall with red ball 5x forard, 5x, horizontal ADB   Scap squeeze mirror 5sec holds 3m total -NP  Planks  w/ shoulder [id number]   OH ballard carries #10 KB ball up 4 laps    Home Exercises and Patient Education Provided     Education provided:   - yes    Written Home Exercises Provided: Patient instructed to cont prior HEP.  Exercises  were reviewed and Gautam was able to demonstrate them prior to the end of the session.  Gautam demonstrated good  understanding of the education provided.     See EMR under patient instructions for exercises given.     Assessment     Pt has improved w/ scap control. No reports of pain during session.      Gautam is progressing well towards his goals.     Pt will continue to benefit from skilled outpatient physical therapy to address the deficits listed in the problem list box on initial evaluation, provide pt/family education and to maximize pt's level of independence in the home and community environment. Pt prognosis is Good.     Pt's spiritual, cultural and educational needs considered and pt agreeable to plan of care and goals.    Anticipated barriers to physical therapy: None    Goals:  Short Term Goals: 2-4 weeks  1. Pt will be compliant with HEP 50% of prescribed amount.   2. The pt to demo improvement in R scap upward rotation to normal   3.  The pt to demo normal thoracic extension during OH press     Long Term Goals: 6-8 weeks   1. The pt to tolerate completing 95# bench press 5x without pain in shoulder and with efficient technique   2. The pt to tolerate pressing 15# DB OH without pain in shoulder 10x      3. The pt will report full participation in ADLs and IADLs without restrictions related to R shoulder.     Plan     Cont to focus on CKC exercises to improve periscapular control.     Hemalatha Sellers PTA ,

## 2019-10-02 ENCOUNTER — CLINICAL SUPPORT (OUTPATIENT)
Dept: REHABILITATION | Facility: HOSPITAL | Age: 44
End: 2019-10-02
Attending: PHYSICIAN ASSISTANT
Payer: COMMERCIAL

## 2019-10-02 DIAGNOSIS — M25.511 ACUTE PAIN OF RIGHT SHOULDER: ICD-10-CM

## 2019-10-02 PROCEDURE — 97112 NEUROMUSCULAR REEDUCATION: CPT

## 2019-10-02 PROCEDURE — 97110 THERAPEUTIC EXERCISES: CPT

## 2019-10-02 PROCEDURE — 97530 THERAPEUTIC ACTIVITIES: CPT

## 2019-10-02 NOTE — PROGRESS NOTES
Physical Therapy Daily Treatment Note     Name: Gautam Ross  Clinic Number: [id number]    Therapy Diagnosis:   Encounter Diagnosis   Name Primary?    Acute pain of right shoulder      Physician: Dain Watters, *    Visit Date: 10/2/2019  Physician Orders: PT Eval and Treat  Medical Diagnosis from Referral:   M75.21 (ICD-10-CM) - Biceps tendinitis of right upper extremity   M25.511 (ICD-10-CM) - Right shoulder pain, unspecified chronicity      Evaluation Date: 9/16/2019  Authorization Period Expiration: 12/31/2019  Plan of Care Expiration: 11/11/2019  Visit # / Visits authorized: 4/ 20     Time In: 0905  Time Out: 1000  Total Billable Time: 30 minutes         Subjective     Pt reports: shoulder a little sore from sleeping wrong on it this weekend.- no extra pain in the past few days     He was compliant with home exercise program.  Response to previous treatment: no sig change  Functional change: no sig change     Pain: 0/10  Location: right shoulder      Objective     Daily Measurements: better controled scap stab      Daily Treatment       Gautam received therapeutic exercises to develop strength, endurance and ROM for 26 minutes including:  UBE completed for 10 min to increase ROM, endurance and decrease pain to improve tolerance to ADLs and age related activities.   Wall Slides to Y 30x   QPED T/S rot 30x   ER @ 90 w/ step back 20x     Gautam participated in dynamic functional therapeutic activities to improve functional performance for 20m  minutes, including  ABCs on wall with red ball 5x forard,   Planks  w/ shoulder [id number] NPT  OH carries #10 KB ball up 4 laps  Horiztonal ABD pulls with elbow flexion to improve charlie to bow work for hunting 20x       Gautam participated in neuromuscular re-education activities to improve: motor control, postural control for 8 minutes. The following activities were included:  Chops and lifts 1/2 kneel position with green MB 5# 10x B 2 rounds     Home  Exercises and Patient Education Provided     Education provided:   - yes    Written Home Exercises Provided: Patient instructed to cont prior HEP.  Exercises were reviewed and Gautam was able to demonstrate them prior to the end of the session.  Gautam demonstrated good  understanding of the education provided.     See EMR under patient instructions for exercises given.     Assessment     The pt with sig cueing required curing scap retraction with shoulder flexed above 60 deg. Will require more focus on this movement to improve tolerance to recreational activities such as hunting and wellness activities such as push-ups and bench press.       Gautam is progressing well towards his goals.     Pt will continue to benefit from skilled outpatient physical therapy to address the deficits listed in the problem list box on initial evaluation, provide pt/family education and to maximize pt's level of independence in the home and community environment. Pt prognosis is Good.     Pt's spiritual, cultural and educational needs considered and pt agreeable to plan of care and goals.    Anticipated barriers to physical therapy: None    Goals:  Short Term Goals: 2-4 weeks  1. Pt will be compliant with HEP 50% of prescribed amount.   2. The pt to demo improvement in R scap upward rotation to normal   3.  The pt to demo normal thoracic extension during OH press     Long Term Goals: 6-8 weeks   1. The pt to tolerate completing 95# bench press 5x without pain in shoulder and with efficient technique   2. The pt to tolerate pressing 15# DB OH without pain in shoulder 10x      3. The pt will report full participation in ADLs and IADLs without restrictions related to R shoulder.     Plan     Cont to focus on CKC exercises to improve periscapular control.     Silvia Tan, PT , DPT

## 2019-10-07 ENCOUNTER — CLINICAL SUPPORT (OUTPATIENT)
Dept: REHABILITATION | Facility: HOSPITAL | Age: 44
End: 2019-10-07
Attending: PHYSICIAN ASSISTANT
Payer: COMMERCIAL

## 2019-10-07 DIAGNOSIS — M25.511 ACUTE PAIN OF RIGHT SHOULDER: ICD-10-CM

## 2019-10-07 PROCEDURE — 97530 THERAPEUTIC ACTIVITIES: CPT

## 2019-10-07 PROCEDURE — 97110 THERAPEUTIC EXERCISES: CPT

## 2019-10-07 NOTE — PROGRESS NOTES
Physical Therapy Daily Treatment Note     Name: Gautam Ross  Clinic Number: [id number]    Therapy Diagnosis:   Encounter Diagnosis   Name Primary?    Acute pain of right shoulder      Physician: Dain Watters, *    Visit Date: 10/7/2019  Physician Orders: PT Eval and Treat  Medical Diagnosis from Referral:   M75.21 (ICD-10-CM) - Biceps tendinitis of right upper extremity   M25.511 (ICD-10-CM) - Right shoulder pain, unspecified chronicity      Evaluation Date: 9/16/2019  Authorization Period Expiration: 12/31/2019  Plan of Care Expiration: 11/11/2019  Visit # / Visits authorized: 5/ 20     Time In: 0905  Time Out: 1000  Total Billable Time: 40 minutes         Subjective     Pt reports: has not had pain in the past few days- had one instance of pain when akwardly lifting a tble from behind the dryer but otherwise doing good- able to sleep on side without pain.     He was compliant with home exercise program.  Response to previous treatment: no sig change  Functional change: see above     Pain: 0/10  Location: right shoulder      Objective     Daily Measurements: better controled scap stab      Daily Treatment       Gautam received therapeutic exercises to develop strength, endurance and ROM for 25 minutes including:  UBE completed for 10 min to increase ROM, endurance and decrease pain to improve tolerance to ADLs and age related activities.   QPED T/S rot 30x   Floor Press 15# DBs 4x5   Rows otb 40x     Gautam participated in dynamic functional therapeutic activities to improve functional performance for 30m  minutes, including  4 rounds   horizontal ABD at 90 deg with thoracic rotation otb 20x   High Pulls 10# 10x B    3 rounds   OH carry bell up 10# KB 60 feet    Carry 10# KB bell up 60 feet      Home Exercises and Patient Education Provided     Education provided:   - yes    Written Home Exercises Provided: Patient instructed to cont prior HEP.  Exercises were reviewed and Gautam was able  to demonstrate them prior to the end of the session.  Gautam demonstrated good  understanding of the education provided.     See EMR under patient instructions for exercises given.     Assessment     The pt required less cueing today compared to last visit to achieve proper scapula control during all therex. Added in thoracici rotation with horizontal ABD of humerus to start to transition to using his bow for recreational activities. The pt charlie all well- no pain. Extender used throughout.     Gautam is progressing well towards his goals.     Pt will continue to benefit from skilled outpatient physical therapy to address the deficits listed in the problem list box on initial evaluation, provide pt/family education and to maximize pt's level of independence in the home and community environment. Pt prognosis is Good.     Pt's spiritual, cultural and educational needs considered and pt agreeable to plan of care and goals.    Anticipated barriers to physical therapy: None    Goals:  Short Term Goals: 2-4 weeks  1. Pt will be compliant with HEP 50% of prescribed amount.   2. The pt to demo improvement in R scap upward rotation to normal   3.  The pt to demo normal thoracic extension during OH press     Long Term Goals: 6-8 weeks   1. The pt to tolerate completing 95# bench press 5x without pain in shoulder and with efficient technique   2. The pt to tolerate pressing 15# DB OH without pain in shoulder 10x      3. The pt will report full participation in ADLs and IADLs without restrictions related to R shoulder.     Plan     Cont to focus on CKC exercises to improve periscapular control.     Silvia Tan, PT , DPT

## 2019-10-09 ENCOUNTER — CLINICAL SUPPORT (OUTPATIENT)
Dept: REHABILITATION | Facility: HOSPITAL | Age: 44
End: 2019-10-09
Attending: PHYSICIAN ASSISTANT
Payer: COMMERCIAL

## 2019-10-09 DIAGNOSIS — M25.511 ACUTE PAIN OF RIGHT SHOULDER: ICD-10-CM

## 2019-10-09 PROCEDURE — 97530 THERAPEUTIC ACTIVITIES: CPT

## 2019-10-09 PROCEDURE — 97110 THERAPEUTIC EXERCISES: CPT

## 2019-10-09 NOTE — PROGRESS NOTES
Physical Therapy Daily Treatment Note     Name: Gautam Ross  Clinic Number: [id number]    Therapy Diagnosis:   Encounter Diagnosis   Name Primary?    Acute pain of right shoulder      Physician: Dain Watters, *    Visit Date: 10/9/2019  Physician Orders: PT Eval and Treat  Medical Diagnosis from Referral:   M75.21 (ICD-10-CM) - Biceps tendinitis of right upper extremity   M25.511 (ICD-10-CM) - Right shoulder pain, unspecified chronicity      Evaluation Date: 9/16/2019  Authorization Period Expiration: 12/31/2019  Plan of Care Expiration: 11/11/2019  Visit # / Visits authorized: 7/ 20     Time In: 0905  Time Out: 0950  Total Billable Time: 40 minutes       Subjective     Pt reports: only time he had pain was putting his heavy backpack in his car today. Otherwise pain free.     He was compliant with home exercise program.  Response to previous treatment: no sig change  Functional change: see above     Pain: 0/10  Location: right shoulder      Objective     Daily Measurements: better controled scap stab      Daily Treatment       Gautam received therapeutic exercises to develop strength, endurance and ROM for 25 minutes including:  UBE completed for 5/5 min to increase ROM, endurance and decrease pain to improve tolerance to ADLs and age related activities.   QPED T/S rot 30x   Floor Press 25# DBs 5x5   Rows gtb 40x     Gautam participated in dynamic functional therapeutic activities to improve functional performance for 30m  minutes, including  4 rounds   horizontal ABD at 90 deg with thoracic rotation otb 20x   High Pulls 10# 10x B  Ant/Mid deltoid 10x ea. 2.5#    5 rounds   OH carry bell up 10# KB 60 feet    Carry 10# KB bell up 60 feet      Home Exercises and Patient Education Provided     Education provided:   - yes    Written Home Exercises Provided: Patient instructed to cont prior HEP.  Exercises were reviewed and Gautam was able to demonstrate them prior to the end of the session.   Gautam demonstrated good  understanding of the education provided.     See EMR under patient instructions for exercises given.     Assessment     The pt instructed on importance of improving back ms strength to dec use of internal rotation ms causing impingement in shoulder. The pt able to complete all therex in a timely manner without pain- fatigue following.     Gautam is progressing well towards his goals.     Pt will continue to benefit from skilled outpatient physical therapy to address the deficits listed in the problem list box on initial evaluation, provide pt/family education and to maximize pt's level of independence in the home and community environment. Pt prognosis is Good.     Pt's spiritual, cultural and educational needs considered and pt agreeable to plan of care and goals.    Anticipated barriers to physical therapy: None    Goals:  Short Term Goals: 2-4 weeks  1. Pt will be compliant with HEP 50% of prescribed amount.   2. The pt to demo improvement in R scap upward rotation to normal   3.  The pt to demo normal thoracic extension during OH press     Long Term Goals: 6-8 weeks   1. The pt to tolerate completing 95# bench press 5x without pain in shoulder and with efficient technique   2. The pt to tolerate pressing 15# DB OH without pain in shoulder 10x      3. The pt will report full participation in ADLs and IADLs without restrictions related to R shoulder.     Plan     Cont to focus on CKC exercises to improve periscapular control.     Silvia Tan, PT , DPT

## 2020-07-29 ENCOUNTER — OFFICE VISIT (OUTPATIENT)
Dept: OPTOMETRY | Facility: CLINIC | Age: 45
End: 2020-07-29
Payer: COMMERCIAL

## 2020-07-29 DIAGNOSIS — H52.4 MYOPIA OF BOTH EYES WITH ASTIGMATISM AND PRESBYOPIA: Primary | ICD-10-CM

## 2020-07-29 DIAGNOSIS — Z46.0 FITTING AND ADJUSTMENT OF SPECTACLES AND CONTACT LENSES: Primary | ICD-10-CM

## 2020-07-29 DIAGNOSIS — Z98.890 HX OF LASIK: ICD-10-CM

## 2020-07-29 DIAGNOSIS — H52.203 MYOPIA OF BOTH EYES WITH ASTIGMATISM AND PRESBYOPIA: Primary | ICD-10-CM

## 2020-07-29 DIAGNOSIS — H52.13 MYOPIA OF BOTH EYES WITH ASTIGMATISM AND PRESBYOPIA: Primary | ICD-10-CM

## 2020-07-29 PROCEDURE — 92014 COMPRE OPH EXAM EST PT 1/>: CPT | Mod: S$GLB,,, | Performed by: OPTOMETRIST

## 2020-07-29 PROCEDURE — 99999 PR PBB SHADOW E&M-EST. PATIENT-LVL II: CPT | Mod: PBBFAC,,, | Performed by: OPTOMETRIST

## 2020-07-29 PROCEDURE — 92310 PR CONTACT LENS FITTING (NO CHANGE): ICD-10-PCS | Mod: CSM,S$GLB,, | Performed by: OPTOMETRIST

## 2020-07-29 PROCEDURE — 92015 PR REFRACTION: ICD-10-PCS | Mod: S$GLB,,, | Performed by: OPTOMETRIST

## 2020-07-29 PROCEDURE — 92015 DETERMINE REFRACTIVE STATE: CPT | Mod: S$GLB,,, | Performed by: OPTOMETRIST

## 2020-07-29 PROCEDURE — 92014 PR EYE EXAM, EST PATIENT,COMPREHESV: ICD-10-PCS | Mod: S$GLB,,, | Performed by: OPTOMETRIST

## 2020-07-29 PROCEDURE — 92310 CONTACT LENS FITTING OU: CPT | Mod: CSM,S$GLB,, | Performed by: OPTOMETRIST

## 2020-07-29 PROCEDURE — 99999 PR PBB SHADOW E&M-EST. PATIENT-LVL II: ICD-10-PCS | Mod: PBBFAC,,, | Performed by: OPTOMETRIST

## 2020-07-29 NOTE — PROGRESS NOTES
HPI     DLS; 7/1/19  Pt states he is having a slight blurry VA with dist with his contacts (one   day for astig). Pt would like to get an Rx for contacts and glasses.   No f/f  No gtts     Last edited by Kolby Camacho, OD on 7/29/2020  3:52 PM. (History)        ROS     Positive for: Eyes (LASIK OU)    Negative for: Constitutional, Gastrointestinal, Neurological, Skin,   Genitourinary, Musculoskeletal, HENT, Endocrine, Cardiovascular,   Respiratory, Psychiatric, Allergic/Imm, Heme/Lymph    Last edited by Kolby Camacho, OD on 7/29/2020  3:52 PM. (History)        Assessment /Plan     For exam results, see Encounter Report.    Myopia of both eyes with astigmatism and presbyopia    Hx of LASIK        1. Residual myopia/astig/presby sp LASIK 15 years ago.  Pt wearing ONE DAY AV MOIST ASTIG Cls w good fit, but needs slight Rx adjustment.  Wears otc readers when needed      PLAN:    1. Sent pt to optical to get TRIALS in [place].  I do NOT need to see at disp  2. Cont DW sched--exchange nightly  3.  If no problems will call for CLRx, rtc 1 yr

## 2020-08-17 ENCOUNTER — OFFICE VISIT (OUTPATIENT)
Dept: OPTOMETRY | Facility: CLINIC | Age: 45
End: 2020-08-17
Payer: COMMERCIAL

## 2020-08-17 DIAGNOSIS — Z46.0 FITTING AND ADJUSTMENT OF SPECTACLES AND CONTACT LENSES: Primary | ICD-10-CM

## 2020-08-17 PROCEDURE — 99499 UNLISTED E&M SERVICE: CPT | Mod: S$GLB,,, | Performed by: OPTOMETRIST

## 2020-08-17 PROCEDURE — 99499 NO LOS: ICD-10-PCS | Mod: S$GLB,,, | Performed by: OPTOMETRIST

## 2020-08-17 NOTE — PROGRESS NOTES
Assessment /Plan     For exam results, see Encounter Report.    Fitting and adjustment of spectacles and contact lenses      See notes from last month:   Residual myopia/astig/presby sp LASIK 15 years ago.  Pt wearing ONE DAY AV MOIST ASTIG Cls w good fit, but needs slight Rx adjustment.  Wears otc readers when needed    TODAY pt presents because he felt he couldn't see as well thru the NEW left lens as he did thru the OLD left lens, but ALSO feels even with the OLD lens his distance VA still blurry.  TODAY he wore the OLD CL OS, and I am finding he likes a -0.50 trial lens over the old left lens    PLAN:    1. Sent pt to optical to get new trials in latest [place] w inc minus OS--I do NOT need to see at disp  2. If no problems will call for CLRx, rtc 1 yr  3. NOLOS CLFU today

## 2020-10-05 ENCOUNTER — PATIENT MESSAGE (OUTPATIENT)
Dept: INTERNAL MEDICINE | Facility: CLINIC | Age: 45
End: 2020-10-05

## 2020-10-13 ENCOUNTER — HOSPITAL ENCOUNTER (EMERGENCY)
Facility: HOSPITAL | Age: 45
Discharge: HOME | End: 2020-10-13
Attending: EMERGENCY MEDICINE
Payer: COMMERCIAL

## 2020-10-13 ENCOUNTER — OFFICE VISIT (OUTPATIENT)
Dept: URGENT CARE | Facility: CLINIC | Age: 45
End: 2020-10-13
Payer: COMMERCIAL

## 2020-10-13 VITALS
RESPIRATION RATE: 18 BRPM | TEMPERATURE: 99 F | SYSTOLIC BLOOD PRESSURE: 152 MMHG | DIASTOLIC BLOOD PRESSURE: 73 MMHG | WEIGHT: 190 LBS | HEIGHT: 68 IN | HEART RATE: 60 BPM | BODY MASS INDEX: 28.79 KG/M2 | OXYGEN SATURATION: 96 %

## 2020-10-13 VITALS
DIASTOLIC BLOOD PRESSURE: 87 MMHG | SYSTOLIC BLOOD PRESSURE: 136 MMHG | WEIGHT: 191 LBS | RESPIRATION RATE: 14 BRPM | BODY MASS INDEX: 28.95 KG/M2 | TEMPERATURE: 97 F | HEART RATE: 86 BPM | HEIGHT: 68 IN | OXYGEN SATURATION: 98 %

## 2020-10-13 DIAGNOSIS — R52 UNCONTROLLED PAIN: ICD-10-CM

## 2020-10-13 DIAGNOSIS — R52 PAIN: ICD-10-CM

## 2020-10-13 DIAGNOSIS — R31.9 HEMATURIA, UNSPECIFIED TYPE: Primary | ICD-10-CM

## 2020-10-13 DIAGNOSIS — R31.9 HEMATURIA, UNSPECIFIED TYPE: ICD-10-CM

## 2020-10-13 DIAGNOSIS — R10.9 RIGHT FLANK PAIN: Primary | ICD-10-CM

## 2020-10-13 DIAGNOSIS — M62.82 NON-TRAUMATIC RHABDOMYOLYSIS: ICD-10-CM

## 2020-10-13 DIAGNOSIS — N20.0 NEPHROLITHIASIS: ICD-10-CM

## 2020-10-13 DIAGNOSIS — R10.9 RIGHT FLANK PAIN: ICD-10-CM

## 2020-10-13 LAB
ALBUMIN SERPL BCP-MCNC: 4.4 G/DL (ref 3.5–5.2)
ALP SERPL-CCNC: 102 U/L (ref 55–135)
ALT SERPL W/O P-5'-P-CCNC: 48 U/L (ref 10–44)
ANION GAP SERPL CALC-SCNC: 10 MMOL/L (ref 8–16)
AST SERPL-CCNC: 33 U/L (ref 10–40)
BACTERIA #/AREA URNS AUTO: ABNORMAL /HPF
BASOPHILS # BLD AUTO: 0.04 K/UL (ref 0–0.2)
BASOPHILS NFR BLD: 0.5 % (ref 0–1.9)
BILIRUB SERPL-MCNC: 0.6 MG/DL (ref 0.1–1)
BILIRUB UR QL STRIP: NEGATIVE
BILIRUB UR QL STRIP: NEGATIVE
BUN SERPL-MCNC: 24 MG/DL (ref 6–20)
CALCIUM SERPL-MCNC: 9.3 MG/DL (ref 8.7–10.5)
CHLORIDE SERPL-SCNC: 101 MMOL/L (ref 95–110)
CK SERPL-CCNC: 478 U/L (ref 20–200)
CLARITY UR REFRACT.AUTO: ABNORMAL
CO2 SERPL-SCNC: 27 MMOL/L (ref 23–29)
COLOR UR AUTO: YELLOW
CREAT SERPL-MCNC: 1.2 MG/DL (ref 0.5–1.4)
DIFFERENTIAL METHOD: ABNORMAL
EOSINOPHIL # BLD AUTO: 0.1 K/UL (ref 0–0.5)
EOSINOPHIL NFR BLD: 1.5 % (ref 0–8)
ERYTHROCYTE [DISTWIDTH] IN BLOOD BY AUTOMATED COUNT: 13.2 % (ref 11.5–14.5)
EST. GFR  (AFRICAN AMERICAN): >60 ML/MIN/1.73 M^2
EST. GFR  (NON AFRICAN AMERICAN): >60 ML/MIN/1.73 M^2
GLUCOSE SERPL-MCNC: 102 MG/DL (ref 70–110)
GLUCOSE UR QL STRIP: NEGATIVE
GLUCOSE UR QL STRIP: NEGATIVE
HCT VFR BLD AUTO: 46.5 % (ref 40–54)
HGB BLD-MCNC: 15.8 G/DL (ref 14–18)
HGB UR QL STRIP: ABNORMAL
HYALINE CASTS UR QL AUTO: 0 /LPF
IMM GRANULOCYTES # BLD AUTO: 0.03 K/UL (ref 0–0.04)
IMM GRANULOCYTES NFR BLD AUTO: 0.4 % (ref 0–0.5)
KETONES UR QL STRIP: NEGATIVE
KETONES UR QL STRIP: NEGATIVE
LEUKOCYTE ESTERASE UR QL STRIP: NEGATIVE
LEUKOCYTE ESTERASE UR QL STRIP: NEGATIVE
LYMPHOCYTES # BLD AUTO: 1.2 K/UL (ref 1–4.8)
LYMPHOCYTES NFR BLD: 16.4 % (ref 18–48)
MCH RBC QN AUTO: 28 PG (ref 27–31)
MCHC RBC AUTO-ENTMCNC: 34 G/DL (ref 32–36)
MCV RBC AUTO: 82 FL (ref 82–98)
MICROSCOPIC COMMENT: ABNORMAL
MONOCYTES # BLD AUTO: 0.6 K/UL (ref 0.3–1)
MONOCYTES NFR BLD: 7.5 % (ref 4–15)
NEUTROPHILS # BLD AUTO: 5.5 K/UL (ref 1.8–7.7)
NEUTROPHILS NFR BLD: 73.7 % (ref 38–73)
NITRITE UR QL STRIP: NEGATIVE
NRBC BLD-RTO: 0 /100 WBC
PH UR STRIP: 5 [PH] (ref 5–8)
PH, POC UA: 5 (ref 5–8)
PLATELET # BLD AUTO: 202 K/UL (ref 150–350)
PMV BLD AUTO: 10.9 FL (ref 9.2–12.9)
POC BLOOD, URINE: POSITIVE
POC NITRATES, URINE: NEGATIVE
POTASSIUM SERPL-SCNC: 3.9 MMOL/L (ref 3.5–5.1)
PROT SERPL-MCNC: 7.8 G/DL (ref 6–8.4)
PROT UR QL STRIP: ABNORMAL
PROT UR QL STRIP: POSITIVE
RBC # BLD AUTO: 5.64 M/UL (ref 4.6–6.2)
RBC #/AREA URNS AUTO: 42 /HPF (ref 0–4)
SODIUM SERPL-SCNC: 138 MMOL/L (ref 136–145)
SP GR UR STRIP: 1.02 (ref 1–1.03)
SP GR UR STRIP: 1.03 (ref 1–1.03)
SQUAMOUS #/AREA URNS AUTO: 0 /HPF
URN SPEC COLLECT METH UR: ABNORMAL
UROBILINOGEN UR STRIP-ACNC: ABNORMAL (ref 0.3–2.2)
WBC # BLD AUTO: 7.51 K/UL (ref 3.9–12.7)
WBC #/AREA URNS AUTO: 2 /HPF (ref 0–5)

## 2020-10-13 PROCEDURE — 81001 URINALYSIS AUTO W/SCOPE: CPT

## 2020-10-13 PROCEDURE — 85025 COMPLETE CBC W/AUTO DIFF WBC: CPT

## 2020-10-13 PROCEDURE — 99214 OFFICE O/P EST MOD 30 MIN: CPT | Mod: 25,S$GLB,, | Performed by: PHYSICIAN ASSISTANT

## 2020-10-13 PROCEDURE — 99284 EMERGENCY DEPT VISIT MOD MDM: CPT | Mod: 25

## 2020-10-13 PROCEDURE — 82550 ASSAY OF CK (CPK): CPT

## 2020-10-13 PROCEDURE — 99284 PR EMERGENCY DEPT VISIT,LEVEL IV: ICD-10-PCS | Mod: ,,, | Performed by: EMERGENCY MEDICINE

## 2020-10-13 PROCEDURE — 96360 HYDRATION IV INFUSION INIT: CPT

## 2020-10-13 PROCEDURE — 25000003 PHARM REV CODE 250: Performed by: NURSE PRACTITIONER

## 2020-10-13 PROCEDURE — 99284 EMERGENCY DEPT VISIT MOD MDM: CPT | Mod: ,,, | Performed by: EMERGENCY MEDICINE

## 2020-10-13 PROCEDURE — 99214 PR OFFICE/OUTPT VISIT, EST, LEVL IV, 30-39 MIN: ICD-10-PCS | Mod: 25,S$GLB,, | Performed by: PHYSICIAN ASSISTANT

## 2020-10-13 PROCEDURE — 81003 POCT URINALYSIS, DIPSTICK, AUTOMATED, W/O SCOPE: ICD-10-PCS | Mod: QW,S$GLB,, | Performed by: PHYSICIAN ASSISTANT

## 2020-10-13 PROCEDURE — 96361 HYDRATE IV INFUSION ADD-ON: CPT

## 2020-10-13 PROCEDURE — 87086 URINE CULTURE/COLONY COUNT: CPT

## 2020-10-13 PROCEDURE — 80053 COMPREHEN METABOLIC PANEL: CPT

## 2020-10-13 PROCEDURE — 81003 URINALYSIS AUTO W/O SCOPE: CPT | Mod: QW,S$GLB,, | Performed by: PHYSICIAN ASSISTANT

## 2020-10-13 RX ORDER — KETOROLAC TROMETHAMINE 10 MG/1
10 TABLET, FILM COATED ORAL 2 TIMES DAILY PRN
Qty: 10 TABLET | Refills: 0 | Status: SHIPPED | OUTPATIENT
Start: 2020-10-13 | End: 2020-10-18

## 2020-10-13 RX ORDER — ONDANSETRON 4 MG/1
4 TABLET, ORALLY DISINTEGRATING ORAL EVERY 6 HOURS PRN
Qty: 10 TABLET | Refills: 0 | Status: SHIPPED | OUTPATIENT
Start: 2020-10-13 | End: 2022-06-22 | Stop reason: ALTCHOICE

## 2020-10-13 RX ADMIN — SODIUM CHLORIDE 1000 ML: 0.9 INJECTION, SOLUTION INTRAVENOUS at 07:10

## 2020-10-13 NOTE — FIRST PROVIDER EVALUATION
Emergency Department TeleTriage Encounter Note      CHIEF COMPLAINT    Chief Complaint   Patient presents with    Flank Pain     right sided; hx kidney stones; dark urine       VITAL SIGNS   Initial Vitals [10/13/20 1834]   BP Pulse Resp Temp SpO2   137/67 72 16 97.9 °F (36.6 °C) 100 %      MAP       --            ALLERGIES    Review of patient's allergies indicates:  No Known Allergies    PROVIDER TRIAGE NOTE  This is a teletriage evaluation of a 45 y.o. male presenting to the ED with c/o right flank pain.  Reports pain is intermittent.  Reports no personal history of kidney stones, does have a family history of kidney stones.  Does also endorse dark urine and doing CrossFit earlier today with decreased oral intake of fluids.  Initial orders will be placed and care will be transferred to an alternate provider when patient is roomed for a full evaluation. Any additional orders and the final disposition will be determined by that provider.         ORDERS  Labs Reviewed   CBC W/ AUTO DIFFERENTIAL   COMPREHENSIVE METABOLIC PANEL   URINALYSIS, REFLEX TO URINE CULTURE   CK       ED Orders (720h ago, onward)    Start Ordered     Status Ordering Provider    10/13/20 1900 10/13/20 1847  sodium chloride 0.9% bolus 1,000 mL  ED 1 Time      Ordered SARAH AGGARWAL    10/13/20 1848 10/13/20 1847  Saline lock IV  Once      Ordered SARAH AGGARWAL    10/13/20 1848 10/13/20 1847  CBC auto differential  STAT  Collect    Ordered SARAH AGGARWAL    10/13/20 1848 10/13/20 1847  Comprehensive metabolic panel  STAT  Collect    Ordered SARAH AGGARWAL    10/13/20 1848 10/13/20 1847  Urinalysis, Reflex to Urine Culture Urine, Clean Catch  STAT      Ordered SARAH AGGARWAL    10/13/20 1848 10/13/20 1847  CPK  STAT  Collect    Ordered SARAH AGGARWAL            Virtual Visit Note: The provider triage portion of this emergency department evaluation and documentation was performed via [employer], a HIPAA-compliant telemedicine  application, in concert with a tele-presenter in the room. A face to face patient evaluation with one of my colleagues will occur once the patient is placed in an emergency department room.      DISCLAIMER: This note was prepared with [employer] voice recognition transcription software. Garbled syntax, mangled pronouns, and other bizarre constructions may be attributed to that software system.

## 2020-10-13 NOTE — PROGRESS NOTES
"Subjective:       Patient ID: Gautam Ross is a 45 y.o. male.    Vitals:  height is 5' 8" (1.727 m) and weight is 86.6 kg (191 lb). His skin temperature is 97.1 °F (36.2 °C). His blood pressure is 136/87 and his pulse is 86. His respiration is 14 and oxygen saturation is 98%.     Chief Complaint: Hematuria        This is a 45 yr old male c/o sudden onset gross hematuria at about 3pm today. Has associated right flank pain as well. He did cross fit this morning, states he exercised harder than usual in a hot room for about 1 hour. He does have muscle soreness in thighs and back, but nothing extreme - no more than usual. States urinating slightly less than usual but only has had a couple of cups of water today. Denies muscular swelling, tenderness on palpation or weakness.   Denies lightheadedness, syncope, altered mental status, confusion.  States he has concern for rhabdomyolysis.  He denies any history of kidney stones but reports he has a strong family history of kidney stones. Denies dysuria, frequency, urgency, fever, N/V.    On further questioning and during workup of patient, he becomes in acute pain. States 6/10 right flank pain, nausea, sweats, lightheaded , presyncope. He declines further workup in the UC setting and requests to be sent to the ED for pain control, imaging, labs as necessary, fluids, observation.    Hematuria  This is a new problem. The current episode started today. The problem is unchanged. He describes the hematuria as microscopic hematuria. He reports no clotting in his urine stream. His pain is at a severity of 1/10. The pain is mild. He describes his urine color as tea colored. Irritative symptoms do not include frequency or urgency. Associated symptoms include flank pain. Pertinent negatives include no abdominal pain, chills, dysuria, fever, inability to urinate, nausea, vomiting or sore throat. He is sexually active. There is no history of hypertension, kidney stones, sickle " cell disease, STDs or UTI.       Constitution: Negative for chills, fatigue and fever.   HENT: Negative for congestion and sore throat.    Neck: Negative for painful lymph nodes.   Cardiovascular: Negative for chest pain and leg swelling.   Eyes: Negative for double vision and blurred vision.   Respiratory: Negative for cough and shortness of breath.    Gastrointestinal: Negative for abdominal pain, nausea, vomiting and diarrhea.   Genitourinary: Positive for flank pain and hematuria. Negative for dysuria, frequency and urgency.   Musculoskeletal: Negative for joint pain, joint swelling, muscle cramps and muscle ache.   Skin: Negative for color change, pale and rash.   Allergic/Immunologic: Negative for seasonal allergies.   Neurological: Negative for dizziness, history of vertigo, light-headedness, passing out and headaches.   Hematologic/Lymphatic: Negative for swollen lymph nodes, easy bruising/bleeding and history of blood clots. Does not bruise/bleed easily.   Psychiatric/Behavioral: Negative for nervous/anxious, sleep disturbance and depression. The patient is not nervous/anxious.        Objective:      Physical Exam   Constitutional: He is oriented to person, place, and time. He appears well-developed.  Non-toxic appearance. He appears ill. He appears distressed (mild).      Comments:Appears to feel ill, diaphoretic, pale, pt has to lie back due to pain   HENT:   Head: Normocephalic and atraumatic.   Ears:   Right Ear: External ear normal.   Left Ear: External ear normal.   Nose: Nose normal.   Mouth/Throat: Mucous membranes are normal.   Eyes: Conjunctivae and lids are normal.   Neck: Trachea normal and full passive range of motion without pain. Neck supple.   Cardiovascular: Normal rate, regular rhythm and normal heart sounds.   Pulmonary/Chest: Effort normal and breath sounds normal. No respiratory distress.   Abdominal: Soft. Normal appearance and bowel sounds are normal. He exhibits no distension, no  abdominal bruit, no pulsatile midline mass and no mass. There is no abdominal tenderness. There is right CVA tenderness. There is no rebound, no guarding and no left CVA tenderness.   Musculoskeletal: Normal range of motion.   Neurological: He is alert and oriented to person, place, and time. He has normal strength.   Skin: Skin is warm, dry, intact, not diaphoretic and not pale. Psychiatric: His speech is normal and behavior is normal. Judgment and thought content normal.   Nursing note and vitals reviewed.    Results for orders placed or performed in visit on 10/13/20   POCT Urinalysis, Dipstick, Automated, W/O Scope   Result Value Ref Range    POC Blood, Urine Positive (A) Negative    POC Bilirubin, Urine Negative Negative    POC Urobilinogen, Urine norm 0.3 - 2.2    POC Ketones, Urine Negative Negative    POC Protein, Urine Positive (A) Negative    POC Nitrates, Urine Negative Negative    POC Glucose, Urine Negative Negative    pH, UA 5.0 5 - 8    POC Specific Gravity, Urine 1.020 1.003 - 1.029    POC Leukocytes, Urine Negative Negative           Assessment:       1. Hematuria, unspecified type    2. Pain    3. Right flank pain    4. Uncontrolled pain        Plan:         Pt initially presented with little to no pain and mild symptoms. I planned to run a chem8 to eval for RONAL. Suspicious of renal stone vs RONAL. During visit and during workup, his R flank pain worsened significantly and is associated with N/V, sweats, presyncope.  He declined further workup in UC and declined chem8 due to pain and symptoms. He wishes to be seen in the ED at this time for pain control, labs, imaging as necessary, fluids.  Wife to drive him there. Called into [facility] main campus. Discussed with Dr Henry      Hematuria, unspecified type  -     POCT Urinalysis, Dipstick, Automated, W/O Scope  -     Urine culture  -     Cancel: POCT Chemistry Panel  -     Refer to Emergency Dept.  -     Cancel: POCT Chemistry Panel    Pain    Right flank  pain  -     Refer to Emergency Dept.    Uncontrolled pain  -     Refer to Emergency Dept.         Labs reviewed, pertinent imaging reviewed, previous medical records, medical history, surgical history, social history, family history reviewed.    Patient Instructions   Please go to the emergency department

## 2020-10-14 ENCOUNTER — TELEPHONE (OUTPATIENT)
Dept: UROLOGY | Facility: CLINIC | Age: 45
End: 2020-10-14

## 2020-10-14 DIAGNOSIS — N20.0 KIDNEY STONE: Primary | ICD-10-CM

## 2020-10-14 PROBLEM — M25.511 PAIN IN RIGHT SHOULDER: Status: RESOLVED | Noted: 2019-09-16 | Resolved: 2020-10-14

## 2020-10-14 PROBLEM — K35.80 ACUTE APPENDICITIS: Status: RESOLVED | Noted: 2018-05-13 | Resolved: 2020-10-14

## 2020-10-14 PROBLEM — T81.43XA POSTPROCEDURAL INTRAABDOMINAL ABSCESS: Status: RESOLVED | Noted: 2018-05-24 | Resolved: 2020-10-14

## 2020-10-14 PROBLEM — K82.9 GALLBLADDER ATTACK: Status: RESOLVED | Noted: 2018-11-15 | Resolved: 2020-10-14

## 2020-10-14 PROBLEM — K65.1 POSTPROCEDURAL INTRAABDOMINAL ABSCESS: Status: RESOLVED | Noted: 2018-05-24 | Resolved: 2020-10-14

## 2020-10-14 LAB — BACTERIA UR CULT: NO GROWTH

## 2020-10-14 NOTE — ED PROVIDER NOTES
Encounter Date: 10/13/2020       History     Chief Complaint   Patient presents with    Flank Pain     right sided; hx kidney stones; dark urine     The patient is a 45 year old male presenting to the ED with c/o acute right flank pain that started today. He denies any injury or trauma. He states that the degree of pain is moderate. He states that the course is intermittent. He reports having associated symptoms of nausea, and dark colored urine. He denies any personal history of kidney stones, but that he does have a family history of them. He is also concerned about possibility of Rhabdomyolysis because of the dark colored urine and notes that he had an intense CrossFit work out this morning today. He reports mild muscle soreness that he attributes to working out. He denies any additional symptoms. He denies any pre-arrival treatment.           Review of patient's allergies indicates:  No Known Allergies  Past Medical History:   Diagnosis Date    Acute appendicitis 2018    Gallbladder attack 11/15/2018    Pain in right shoulder 2019    Postprocedural intraabdominal abscess 2018     Past Surgical History:   Procedure Laterality Date    APPENDECTOMY      LAPAROSCOPIC CHOLECYSTECTOMY N/A 11/15/2018    Procedure: CHOLECYSTECTOMY, LAPAROSCOPIC;  Surgeon: Korey Keller Jr., MD;  Location: [facility];  Service: General;  Laterality: N/A;    LASIK Bilateral     UMBILICAL HERNIA REPAIR  10/16/13    open repair without mesh for 1cm defect; Dr. Rosales, [facility]     Family History   Problem Relation Age of Onset    Arthritis Mother     Cancer Father     Diabetes Father     Cataracts Father     Allergies Son         food allergies    Amblyopia Neg Hx     Blindness Neg Hx     Glaucoma Neg Hx     Macular degeneration Neg Hx     Retinal detachment Neg Hx     Strabismus Neg Hx      Social History     Tobacco Use    Smoking status: Former Smoker     Quit date: 2002     Years since quittin.1     Smokeless tobacco: Never Used   Substance Use Topics    Alcohol use: No    Drug use: No     Review of Systems   Constitutional: Negative for chills, diaphoresis and fever.   HENT: Negative for congestion and sore throat.    Respiratory: Negative for cough, chest tightness and shortness of breath.    Cardiovascular: Negative for chest pain and palpitations.   Gastrointestinal: Positive for nausea. Negative for abdominal pain, blood in stool, constipation, diarrhea and vomiting.   Genitourinary: Positive for flank pain and hematuria. Negative for decreased urine volume, difficulty urinating, dysuria, frequency, testicular pain and urgency.   Musculoskeletal: Positive for myalgias. Negative for arthralgias and gait problem.   Skin: Negative for color change and rash.   Allergic/Immunologic: Negative for immunocompromised state.   Neurological: Negative for dizziness, syncope, weakness, light-headedness, numbness and headaches.   Hematological: Does not bruise/bleed easily.   Psychiatric/Behavioral: Negative for confusion.       Physical Exam     Initial Vitals [10/13/20 1834]   BP Pulse Resp Temp SpO2   137/67 72 16 97.9 °F (36.6 °C) 100 %      MAP       --         Physical Exam    Nursing note and vitals reviewed.  Constitutional: He appears well-developed and well-nourished. He is not diaphoretic. He appears distressed.   HENT:   Head: Normocephalic.   Mouth/Throat: Oropharynx is clear and moist.   Eyes: Conjunctivae are normal. No scleral icterus.   Cardiovascular: Normal rate.   Pulmonary/Chest: No respiratory distress.   Abdominal: Soft. He exhibits no distension. There is no abdominal tenderness. There is no rebound and no guarding.   Musculoskeletal: Normal range of motion.      Comments: Right CVA tenderness present    Neurological: He is alert and oriented to person, place, and time. He has normal strength.   Skin: Skin is warm and dry. No rash noted.   Psychiatric: He has a normal mood and affect.          ED Course   Procedures  Labs Reviewed   CBC W/ AUTO DIFFERENTIAL - Abnormal; Notable for the following components:       Result Value    Gran% 73.7 (*)     Lymph% 16.4 (*)     All other components within normal limits   COMPREHENSIVE METABOLIC PANEL - Abnormal; Notable for the following components:    BUN, Bld 24 (*)     ALT 48 (*)     All other components within normal limits   URINALYSIS, REFLEX TO URINE CULTURE - Abnormal; Notable for the following components:    Appearance, UA Hazy (*)     Protein, UA 1+ (*)     Occult Blood UA 3+ (*)     All other components within normal limits    Narrative:     Specimen Source->Urine   CK - Abnormal; Notable for the following components:     (*)     All other components within normal limits   URINALYSIS MICROSCOPIC - Abnormal; Notable for the following components:    RBC, UA 42 (*)     All other components within normal limits    Narrative:     Specimen Source->Urine     Results for orders placed or performed during the hospital encounter of 10/13/20   CBC auto differential   Result Value Ref Range    WBC 7.51 3.90 - 12.70 K/uL    RBC 5.64 4.60 - 6.20 M/uL    Hemoglobin 15.8 14.0 - 18.0 g/dL    Hematocrit 46.5 40.0 - 54.0 %    Mean Corpuscular Volume 82 82 - 98 fL    Mean Corpuscular Hemoglobin 28.0 27.0 - 31.0 pg    Mean Corpuscular Hemoglobin Conc 34.0 32.0 - 36.0 g/dL    RDW 13.2 11.5 - 14.5 %    Platelets 202 150 - 350 K/uL    MPV 10.9 9.2 - 12.9 fL    Immature Granulocytes 0.4 0.0 - 0.5 %    Gran # (ANC) 5.5 1.8 - 7.7 K/uL    Immature Grans (Abs) 0.03 0.00 - 0.04 K/uL    Lymph # 1.2 1.0 - 4.8 K/uL    Mono # 0.6 0.3 - 1.0 K/uL    Eos # 0.1 0.0 - 0.5 K/uL    Baso # 0.04 0.00 - 0.20 K/uL    nRBC 0 0 /100 WBC    Gran% 73.7 (H) 38.0 - 73.0 %    Lymph% 16.4 (L) 18.0 - 48.0 %    Mono% 7.5 4.0 - 15.0 %    Eosinophil% 1.5 0.0 - 8.0 %    Basophil% 0.5 0.0 - 1.9 %    Differential Method Automated    Comprehensive metabolic panel   Result Value Ref Range    Sodium 138  136 - 145 mmol/L    Potassium 3.9 3.5 - 5.1 mmol/L    Chloride 101 95 - 110 mmol/L    CO2 27 23 - 29 mmol/L    Glucose 102 70 - 110 mg/dL    BUN, Bld 24 (H) 6 - 20 mg/dL    Creatinine 1.2 0.5 - 1.4 mg/dL    Calcium 9.3 8.7 - 10.5 mg/dL    Total Protein 7.8 6.0 - 8.4 g/dL    Albumin 4.4 3.5 - 5.2 g/dL    Total Bilirubin 0.6 0.1 - 1.0 mg/dL    Alkaline Phosphatase 102 55 - 135 U/L    AST 33 10 - 40 U/L    ALT 48 (H) 10 - 44 U/L    Anion Gap 10 8 - 16 mmol/L    eGFR if African American >60.0 >60 mL/min/1.73 m^2    eGFR if non African American >60.0 >60 mL/min/1.73 m^2   Urinalysis, Reflex to Urine Culture Urine, Clean Catch    Specimen: Urine   Result Value Ref Range    Specimen UA Urine, Clean Catch     Color, UA Yellow Yellow, Straw, Rose    Appearance, UA Hazy (A) Clear    pH, UA 5.0 5.0 - 8.0    Specific Gravity, UA 1.030 1.005 - 1.030    Protein, UA 1+ (A) Negative    Glucose, UA Negative Negative    Ketones, UA Negative Negative    Bilirubin (UA) Negative Negative    Occult Blood UA 3+ (A) Negative    Nitrite, UA Negative Negative    Leukocytes, UA Negative Negative   CPK   Result Value Ref Range     (H) 20 - 200 U/L   Urinalysis Microscopic   Result Value Ref Range    RBC, UA 42 (H) 0 - 4 /hpf    WBC, UA 2 0 - 5 /hpf    Bacteria Occasional None-Occ /hpf    Squam Epithel, UA 0 /hpf    Hyaline Casts, UA 0 0-1/lpf /lpf    Microscopic Comment SEE COMMENT             Imaging Results          CT Renal Stone Study ABD Pelvis WO (Final result)  Result time 10/13/20 21:28:18    Final result by Shawn Vann MD (10/13/20 21:28:18)                 Impression:      1.  Bilateral extrarenal pelvises with 3-mm calculus identified within the dependent aspect of the right renal pelvis.  No obstructing ureteral calculi identified.    2.  Postoperative change relating to prior appendectomy.    3.  Status post cholecystectomy.      Electronically signed by: Shawn Vann MD  Date:    10/13/2020  Time:    21:28              Narrative:    EXAMINATION:  CT RENAL STONE STUDY ABD PELVIS WO    CLINICAL HISTORY:  Flank pain, kidney stone suspected;    TECHNIQUE:  Low dose axial images, sagittal and coronal reformations were obtained from the lung bases to the pubic symphysis.  Contrast was not administered.    COMPARISON:  CT 05/27/2028    FINDINGS:  There is a bandlike opacity at the left lung base suggesting platelike atelectasis or scarring.  There is no significant pleural fluid present.  The visualized portions of the heart appear normal.    The liver is unremarkable in appearance on this limited non-contrast examination.  The gallbladder is surgically absent.  There is no intra-or extrahepatic biliary ductal dilatation.    The stomach, pancreas, and adrenal glands are unremarkable.  The spleen is at the upper limits of normal for size.    The kidneys are normal in size and location.  There are mildly prominent bilateral renal collecting systems which appear similar to prior CT examinations suggesting extrarenal pelvises.  There is a 3-mm calculus identified within the dependent aspect of the right renal pelvis.  No obstructing right ureteral calculi identified.  No definite calculi identified within the left renal collecting system or ureter.  Urinary bladder appears within normal limits.  The prostate is unremarkable.  There is no significant free fluid within the pelvis.    The abdominal aorta is normal in course and caliber without significant atherosclerotic calcifications.The visualized loops of small and large bowel show no evidence of obstruction or inflammation.  There are postsurgical change relating to prior appendectomy and periappendiceal abscess drainage.  There is presumed suture material identified adjacent to the cecum/ascending colon.  There are a few scattered small mesenteric lymph nodes present.  There is no ascites, portal venous gas, or free intraperitoneal air.    The osseous structures demonstrate mild  degenerative changes of the spine.  The extraperitoneal soft tissues are unremarkable.                                 Medical Decision Making:   Initial Assessment:   Pt here for urgent evaluation due to acute atraumatic right flank pain with associated nausea and dark urine since this morning. Symptoms began gradually after intense Cross fit work out.   Differential Diagnosis:   Kidney stone, renal colic, Rhabdomyolysis, RONAL, dehydration, musculoskeletal pain, viral illness, pyelonephritis, etc   Clinical Tests:   Lab Tests: Ordered and Reviewed  Radiological Study: Ordered and Reviewed  ED Management:  3 mm stone noted in right renal pelvis   No RONAL or UTI   Mildly elevated CPK   UA positive for hematuria   Patient reports feeling better after IVF's and treatment in the ER   Patient advised to rest, hydrate, and avoid strenuous activity until follow up   Patient advised to follow up closely with primary care and urology   Patient advised to return to the ER promptly if worse in any way or if unimproved                                Clinical Impression:     ICD-10-CM ICD-9-CM   1. Right flank pain  R10.9 789.09   2. Nephrolithiasis  N20.0 592.0   3. Hematuria, unspecified type  R31.9 599.70   4. Non-traumatic rhabdomyolysis  M62.82 728.88                      Disposition:   Disposition: Discharged  Condition: Stable     ED Disposition Condition    Discharge Stable        ED Prescriptions     Medication Sig Dispense Start Date End Date Auth. Provider    ketorolac (TORADOL) 10 mg tablet Take 1 tablet (10 mg total) by mouth 2 (two) times daily as needed for Pain. 10 tablet 10/13/2020 10/18/2020 Thierno Field PA-C    ondansetron (ZOFRAN-ODT) 4 MG TbDL Take 1 tablet (4 mg total) by mouth every 6 (six) hours as needed (nausea). 10 tablet 10/13/2020  Thierno Field PA-C        Follow-up Information     Follow up With Specialties Details Why Contact Info Additional Information    Nan Esquivel MD Family  Medicine Schedule an appointment as soon as possible for a visit in 2 days Follow up with your primary care physician in the next 1-2 days for re-evaluation and further management. [address]  [address]  [facility] [address]  [phone]       Darryl D[facility]az - [facility] [address] Urology Schedule an appointment as soon as possible for a visit  Follow up with urology clinic in the next 3-5 days for re-evaluation and further management. [address]  [facility] [id number] [phone] Main Building, 4th Floor Please park in [facility] and take [facility] elevator    [facility] Emergency Medicine  If symptoms worsen in any way. [address] [facility] [id number] [phone]                                        Thierno Field PA-C  10/15/20 [id number]

## 2020-10-14 NOTE — TELEPHONE ENCOUNTER
CT RSS  1.  Bilateral extrarenal pelvises with 3-mm calculus identified within the dependent aspect of the right renal pelvis.  No obstructing ureteral calculi identified.     2.  Postoperative change relating to prior appendectomy.     3.  Status post cholecystectomy.    Kidney stone  -     X-Ray Abdomen AP 1 View; Future; Expected date: 10/14/2020    I got a call from Dr. Gregory Junior regarding this pt.  He is his son-in-law.  Please have him follow up with me at 1 pm tomorrow with KUB.

## 2020-10-14 NOTE — ED TRIAGE NOTES
Gautam Ross, a 45 y.o. male presents to the ED w/ complaint of flank pain    Triage note: Pt presents with right sided flank pain. Pt reports having harder workout than normal earlier this AM. Pt reports tea colored urine.   Chief Complaint   Patient presents with    Flank Pain     right sided; hx kidney stones; dark urine     Review of patient's allergies indicates:  No Known Allergies  History reviewed. No pertinent past medical history.

## 2020-10-15 ENCOUNTER — HOSPITAL ENCOUNTER (OUTPATIENT)
Dept: RADIOLOGY | Facility: HOSPITAL | Age: 45
Discharge: HOME | End: 2020-10-15
Attending: UROLOGY
Payer: COMMERCIAL

## 2020-10-15 ENCOUNTER — OFFICE VISIT (OUTPATIENT)
Dept: INTERNAL MEDICINE | Facility: CLINIC | Age: 45
End: 2020-10-15
Payer: COMMERCIAL

## 2020-10-15 ENCOUNTER — OFFICE VISIT (OUTPATIENT)
Dept: UROLOGY | Facility: CLINIC | Age: 45
End: 2020-10-15
Payer: COMMERCIAL

## 2020-10-15 VITALS
SYSTOLIC BLOOD PRESSURE: 110 MMHG | WEIGHT: 199.31 LBS | DIASTOLIC BLOOD PRESSURE: 68 MMHG | HEART RATE: 72 BPM | BODY MASS INDEX: 30.21 KG/M2 | HEIGHT: 68 IN

## 2020-10-15 VITALS
DIASTOLIC BLOOD PRESSURE: 78 MMHG | HEIGHT: 68 IN | WEIGHT: 199.38 LBS | BODY MASS INDEX: 30.22 KG/M2 | SYSTOLIC BLOOD PRESSURE: 115 MMHG

## 2020-10-15 DIAGNOSIS — R31.29 HEMATURIA, MICROSCOPIC: Primary | ICD-10-CM

## 2020-10-15 DIAGNOSIS — R74.8 ELEVATED CPK: Primary | ICD-10-CM

## 2020-10-15 DIAGNOSIS — N20.0 RIGHT KIDNEY STONE: ICD-10-CM

## 2020-10-15 DIAGNOSIS — N20.0 KIDNEY STONE: ICD-10-CM

## 2020-10-15 PROCEDURE — 99999 PR PBB SHADOW E&M-EST. PATIENT-LVL IV: ICD-10-PCS | Mod: PBBFAC,,, | Performed by: INTERNAL MEDICINE

## 2020-10-15 PROCEDURE — 99999 PR PBB SHADOW E&M-EST. PATIENT-LVL III: ICD-10-PCS | Mod: PBBFAC,,, | Performed by: UROLOGY

## 2020-10-15 PROCEDURE — 3008F BODY MASS INDEX DOCD: CPT | Mod: CPTII,S$GLB,, | Performed by: INTERNAL MEDICINE

## 2020-10-15 PROCEDURE — 99999 PR PBB SHADOW E&M-EST. PATIENT-LVL III: CPT | Mod: PBBFAC,,, | Performed by: UROLOGY

## 2020-10-15 PROCEDURE — 99213 PR OFFICE/OUTPT VISIT, EST, LEVL III, 20-29 MIN: ICD-10-PCS | Mod: S$GLB,,, | Performed by: INTERNAL MEDICINE

## 2020-10-15 PROCEDURE — 99999 PR PBB SHADOW E&M-EST. PATIENT-LVL IV: CPT | Mod: PBBFAC,,, | Performed by: INTERNAL MEDICINE

## 2020-10-15 PROCEDURE — 74018 RADEX ABDOMEN 1 VIEW: CPT | Mod: TC

## 2020-10-15 PROCEDURE — 99244 PR OFFICE CONSULTATION,LEVEL IV: ICD-10-PCS | Mod: S$GLB,,, | Performed by: UROLOGY

## 2020-10-15 PROCEDURE — 87086 URINE CULTURE/COLONY COUNT: CPT

## 2020-10-15 PROCEDURE — 99244 OFF/OP CNSLTJ NEW/EST MOD 40: CPT | Mod: S$GLB,,, | Performed by: UROLOGY

## 2020-10-15 PROCEDURE — 74018 XR ABDOMEN AP 1 VIEW: ICD-10-PCS | Mod: 26,,, | Performed by: RADIOLOGY

## 2020-10-15 PROCEDURE — 99213 OFFICE O/P EST LOW 20 MIN: CPT | Mod: S$GLB,,, | Performed by: INTERNAL MEDICINE

## 2020-10-15 PROCEDURE — 74018 RADEX ABDOMEN 1 VIEW: CPT | Mod: 26,,, | Performed by: RADIOLOGY

## 2020-10-15 PROCEDURE — 3008F PR BODY MASS INDEX (BMI) DOCUMENTED: ICD-10-PCS | Mod: CPTII,S$GLB,, | Performed by: INTERNAL MEDICINE

## 2020-10-15 RX ORDER — TAMSULOSIN HYDROCHLORIDE 0.4 MG/1
0.4 CAPSULE ORAL NIGHTLY
Qty: 30 CAPSULE | Refills: 0 | Status: SHIPPED | OUTPATIENT
Start: 2020-10-15 | End: 2022-06-22

## 2020-10-15 NOTE — PROGRESS NOTES
"EMERGENCY ROOM FOLLOWUP NOTE    Discharge note was reviewed in detail and discussed with the patient.     DISCHARGE DIAGNOSES:  Kidney stone.   "Mild rabdo"    PMFSH: all information reviewed and updated as necessary during this encounter.  Medication list reviewed and reconciled.    Chart reviewed.  Pt. Admits that the day of the stone he had done a very strenuous crossfit work out which most assuradly will cause an elevated CPK.  This is not rhabdo since he is only slightly elevated.      ROS:  GENERAL: No fever, chills, fatigability or weight loss.  CHEST: Denies MEDINA, cyanosis, wheezing, cough and sputum production.  CARDIOVASCULAR: Denies chest pain, PND, orthopnea or reduced exercise tolerance.  ABDOMEN: Appetite fine. No weight loss. Denies diarrhea, abdominal pain, hematemesis or blood in stool.  NEUROLOGIC: No history of seizures, paralysis, alteration of gait or coordination.  ENDOCRINE: Denies polydipsia, polyphagia and polyuria. No thyroid enlargement. Denies heat or cold intolerance.    PE:  APPEARANCE: no acute distress.  Appearing healthy. well nourished.  Vitals:    10/15/20 0955   BP: 110/68   Pulse: 72     This was a 15 minute visit.  Greater than 50% of today's visit was time spent on counseling and coordination of care.     Assessment/plan:  1. Elevated CPK  Recheck level since it's been 2 days since his work out.  If it's reduced or less, no follow up will be needed. Encouraged fluid intake with H2O during workouts. This will also help reduce chances of more kidney stones.   - CK; Future        "

## 2020-10-15 NOTE — LETTER
October 15, 2020      Joaquin Junior II, MD  [address]  [facility] [address]           [facility] Bre - [facility] [address]  [address]  [facility] [address]  Phone: [phone]          Patient: Gautam Ross   MR Number: [id number]   YOB: 1975   Date of Visit: 10/15/2020       Dear Dr. Joaquin Junior II:    Thank you for referring Gautam Ross to me for evaluation. Attached you will find relevant portions of my assessment and plan of care.    If you have questions, please do not hesitate to call me. I look forward to following Gautam Ross along with you.    Sincerely,    Kirill Graves MD    Enclosure  CC:  No Recipients    If you would like to receive this communication electronically, please contact [email or web address] or [phone] to request more information on [employer] access.    For providers and/or their staff who would like to refer a patient to [facility], please contact us through our one-stop-shop provider referral line, [facility], at [phone].    If you feel you have received this communication in error or would no longer like to receive these types of communications, please e-mail [email or web address]

## 2020-10-15 NOTE — PROGRESS NOTES
CC: right kidney stone, right flank pain    Gautam Ross is a 45 y.o. man who is here for the evaluation of Nephrolithiasis    A new pt referred by his PCP, Nan Esquivel MD   Went to the ER on 10/13/20 because of dark urine.  Then developed right flank pain.  Noted to have a right kidney stone on CT.    Currently he has no more pain but continues to have somewhat darker urine.  Voices no problem with urination.    He works out rigorously.  Has a family hx of kidney stones ( his father and brother).  His father in law is Dr. Gregory Junior.    Past Medical History:   Diagnosis Date    Acute appendicitis 2018    Gallbladder attack 11/15/2018    Pain in right shoulder 2019    Postprocedural intraabdominal abscess 2018     Past Surgical History:   Procedure Laterality Date    APPENDECTOMY      LAPAROSCOPIC CHOLECYSTECTOMY N/A 11/15/2018    Procedure: CHOLECYSTECTOMY, LAPAROSCOPIC;  Surgeon: Korey Keller Jr., MD;  Location: [facility];  Service: General;  Laterality: N/A;    LASIK Bilateral     UMBILICAL HERNIA REPAIR  10/16/13    open repair without mesh for 1cm defect; Dr. Rosales, [facility] [place]     Social History     Tobacco Use    Smoking status: Former Smoker     Quit date: 2002     Years since quittin.1    Smokeless tobacco: Never Used   Substance Use Topics    Alcohol use: No    Drug use: No     Family History   Problem Relation Age of Onset    Arthritis Mother     Cancer Father     Diabetes Father     Cataracts Father     Allergies Son         food allergies    Amblyopia Neg Hx     Blindness Neg Hx     Glaucoma Neg Hx     Macular degeneration Neg Hx     Retinal detachment Neg Hx     Strabismus Neg Hx      Allergy:  Review of patient's allergies indicates:  No Known Allergies  Outpatient Encounter Medications as of 10/15/2020   Medication Sig Dispense Refill    fish oil-omega-3 fatty acids 300-1,000 mg capsule Take by mouth once daily.      fluticasone (FLONASE)  50 mcg/actuation nasal spray 1 spray by Each Nare route once daily. Each nostril daily      ketorolac (TORADOL) 10 mg tablet Take 1 tablet (10 mg total) by mouth 2 (two) times daily as needed for Pain. 10 tablet 0    meloxicam (MOBIC) 15 MG tablet Take 1 tablet (15 mg total) by mouth once daily. 30 tablet 2    multivitamin (ONE DAILY MULTIVITAMIN) per tablet Take 1 tablet by mouth once daily.      ondansetron (ZOFRAN-ODT) 4 MG TbDL Take 1 tablet (4 mg total) by mouth every 6 (six) hours as needed (nausea). 10 tablet 0    tamsulosin (FLOMAX) 0.4 mg Cap Take 1 capsule (0.4 mg total) by mouth every evening. 30 capsule 0    UNABLE TO FIND replenex-joint supplement       No facility-administered encounter medications on file as of 10/15/2020.      Review of Systems   ROS  Physical Exam     Vitals:    10/15/20 1305   BP: 115/78     Physical Exam   Constitutional: He is oriented to person, place, and time. He appears well-developed. No distress.   HENT:   Head: Normocephalic and atraumatic.   Right Ear: External ear normal.   Left Ear: External ear normal.   Nose: Nose normal.   Eyes: Conjunctivae are normal. Pupils are equal, round, and reactive to light.   Neck: Normal range of motion. Neck supple. No JVD present. No tracheal deviation present. No thyromegaly present.   Cardiovascular: Normal rate, regular rhythm and normal heart sounds.  Exam reveals no gallop and no friction rub.    No murmur heard.  Pulmonary/Chest: Effort normal and breath sounds normal. No respiratory distress. He has no wheezes. He exhibits no tenderness.   Abdominal: Soft. Bowel sounds are normal. He exhibits no distension and no mass. There is no abdominal tenderness. There is no rebound and no guarding.   Genitourinary:    Prostate, penis and rectum normal.   No penile tenderness.   Musculoskeletal: Normal range of motion. No tenderness or deformity.   Lymphadenopathy:     He has no cervical adenopathy.   Neurological: He is alert and  oriented to person, place, and time.   Skin: Skin is warm and dry. He is not diaphoretic.     Psychiatric: His behavior is normal. Thought content normal.               LABS:  No results found for: PSA, PSADIAG, PSATOTAL, PSAFREE, PSAFREEPCT  No results found for this or any previous visit.  Lab Results   Component Value Date    CREATININE 1.2 10/13/2020    CREATININE 1.0 08/02/2019    CREATININE 1.0 10/27/2018     No results found for this or any previous visit.  Urine Culture, Routine   Date Value Ref Range Status   10/13/2020 No growth  Final     No results found for: HGBA1C    UA positive urine 250 blood    Radiology:  CT 10/13/20  1.  Bilateral extrarenal pelvises with 3-mm calculus identified within the dependent aspect of the right renal pelvis.  No obstructing ureteral calculi identified.     2.  Postoperative change relating to prior appendectomy.     3.  Status post cholecystectomy.  Assessment and Plan:  Gautam was seen today for nephrolithiasis.    Diagnoses and all orders for this visit:    Hematuria, microscopic  -     Urine culture  -     Basic Metabolic Panel; Future    Right kidney stone  -     X-Ray Abdomen AP 1 View; Future  -     X-Ray Abdomen AP 1 View; Future  -     tamsulosin (FLOMAX) 0.4 mg Cap; Take 1 capsule (0.4 mg total) by mouth every evening.  -     Basic Metabolic Panel; Future    reviewed his CT in detail.  Will see whether he will be able to pass the stone.  Hydration.  Flomax  Strain urine.    I think that there is a good chance that we may need to do ESWL  KUB today.  May need to repeat CT again.    Follow-up:  Follow up in about 3 weeks (around 11/5/2020) for kub.

## 2020-10-15 NOTE — H&P (VIEW-ONLY)
CC: right kidney stone, right flank pain    Gautam Ross is a 45 y.o. man who is here for the evaluation of Nephrolithiasis    A new pt referred by his PCP, Nan Esquivel MD   Went to the ER on 10/13/20 because of dark urine.  Then developed right flank pain.  Noted to have a right kidney stone on CT.    Currently he has no more pain but continues to have somewhat darker urine.  Voices no problem with urination.    He works out rigorously.  Has a family hx of kidney stones ( his father and brother).  His father in law is Dr. Gregory Junior.    Past Medical History:   Diagnosis Date    Acute appendicitis 2018    Gallbladder attack 11/15/2018    Pain in right shoulder 2019    Postprocedural intraabdominal abscess 2018     Past Surgical History:   Procedure Laterality Date    APPENDECTOMY      LAPAROSCOPIC CHOLECYSTECTOMY N/A 11/15/2018    Procedure: CHOLECYSTECTOMY, LAPAROSCOPIC;  Surgeon: Korey Keller Jr., MD;  Location: [facility];  Service: General;  Laterality: N/A;    LASIK Bilateral     UMBILICAL HERNIA REPAIR  10/16/13    open repair without mesh for 1cm defect; [place] [facility] [place]     Social History     Tobacco Use    Smoking status: Former Smoker     Quit date: 2002     Years since quittin.1    Smokeless tobacco: Never Used   Substance Use Topics    Alcohol use: No    Drug use: No     Family History   Problem Relation Age of Onset    Arthritis Mother     Cancer Father     Diabetes Father     Cataracts Father     Allergies Son         food allergies    Amblyopia Neg Hx     Blindness Neg Hx     Glaucoma Neg Hx     Macular degeneration Neg Hx     Retinal detachment Neg Hx     Strabismus Neg Hx      Allergy:  Review of patient's allergies indicates:  No Known Allergies  Outpatient Encounter Medications as of 10/15/2020   Medication Sig Dispense Refill    fish oil-omega-3 fatty acids 300-1,000 mg capsule Take by mouth once daily.      fluticasone (FLONASE)  50 mcg/actuation nasal spray 1 spray by Each Nare route once daily. Each nostril daily      ketorolac (TORADOL) 10 mg tablet Take 1 tablet (10 mg total) by mouth 2 (two) times daily as needed for Pain. 10 tablet 0    meloxicam (MOBIC) 15 MG tablet Take 1 tablet (15 mg total) by mouth once daily. 30 tablet 2    multivitamin (ONE DAILY MULTIVITAMIN) per tablet Take 1 tablet by mouth once daily.      ondansetron (ZOFRAN-ODT) 4 MG TbDL Take 1 tablet (4 mg total) by mouth every 6 (six) hours as needed (nausea). 10 tablet 0    tamsulosin (FLOMAX) 0.4 mg Cap Take 1 capsule (0.4 mg total) by mouth every evening. 30 capsule 0    UNABLE TO FIND replenex-joint supplement       No facility-administered encounter medications on file as of 10/15/2020.      Review of Systems   ROS  Physical Exam     Vitals:    10/15/20 1305   BP: 115/78     Physical Exam   Constitutional: He is oriented to person, place, and time. He appears well-developed. No distress.   HENT:   Head: Normocephalic and atraumatic.   Right Ear: External ear normal.   Left Ear: External ear normal.   Nose: Nose normal.   Eyes: Conjunctivae are normal. Pupils are equal, round, and reactive to light.   Neck: Normal range of motion. Neck supple. No JVD present. No tracheal deviation present. No thyromegaly present.   Cardiovascular: Normal rate, regular rhythm and normal heart sounds.  Exam reveals no gallop and no friction rub.    No murmur heard.  Pulmonary/Chest: Effort normal and breath sounds normal. No respiratory distress. He has no wheezes. He exhibits no tenderness.   Abdominal: Soft. Bowel sounds are normal. He exhibits no distension and no mass. There is no abdominal tenderness. There is no rebound and no guarding.   Genitourinary:    Prostate, penis and rectum normal.   No penile tenderness.   Musculoskeletal: Normal range of motion. No tenderness or deformity.   Lymphadenopathy:     He has no cervical adenopathy.   Neurological: He is alert and  oriented to person, place, and time.   Skin: Skin is warm and dry. He is not diaphoretic.     Psychiatric: His behavior is normal. Thought content normal.               LABS:  No results found for: PSA, PSADIAG, PSATOTAL, PSAFREE, PSAFREEPCT  No results found for this or any previous visit.  Lab Results   Component Value Date    CREATININE 1.2 10/13/2020    CREATININE 1.0 08/02/2019    CREATININE 1.0 10/27/2018     No results found for this or any previous visit.  Urine Culture, Routine   Date Value Ref Range Status   10/13/2020 No growth  Final     No results found for: HGBA1C    UA positive urine 250 blood    Radiology:  CT 10/13/20  1.  Bilateral extrarenal pelvises with 3-mm calculus identified within the dependent aspect of the right renal pelvis.  No obstructing ureteral calculi identified.     2.  Postoperative change relating to prior appendectomy.     3.  Status post cholecystectomy.  Assessment and Plan:  Gautam was seen today for nephrolithiasis.    Diagnoses and all orders for this visit:    Hematuria, microscopic  -     Urine culture  -     Basic Metabolic Panel; Future    Right kidney stone  -     X-Ray Abdomen AP 1 View; Future  -     X-Ray Abdomen AP 1 View; Future  -     tamsulosin (FLOMAX) 0.4 mg Cap; Take 1 capsule (0.4 mg total) by mouth every evening.  -     Basic Metabolic Panel; Future    reviewed his CT in detail.  Will see whether he will be able to pass the stone.  Hydration.  Flomax  Strain urine.    I think that there is a good chance that we may need to do ESWL  KUB today.  May need to repeat CT again.    Follow-up:  Follow up in about 3 weeks (around 11/5/2020) for kub.

## 2020-10-15 NOTE — PATIENT INSTRUCTIONS
Drink plenty of water when working out. Your elevated CPK is because of your strenuous crossfit work outs as we discussed.

## 2020-10-16 ENCOUNTER — PATIENT MESSAGE (OUTPATIENT)
Dept: UROLOGY | Facility: CLINIC | Age: 45
End: 2020-10-16

## 2020-10-16 LAB — BACTERIA UR CULT: NO GROWTH

## 2020-10-19 ENCOUNTER — TELEPHONE (OUTPATIENT)
Dept: UROLOGY | Facility: CLINIC | Age: 45
End: 2020-10-19

## 2020-10-19 ENCOUNTER — HOSPITAL ENCOUNTER (OUTPATIENT)
Dept: RADIOLOGY | Facility: HOSPITAL | Age: 45
Discharge: HOME | End: 2020-10-19
Attending: UROLOGY
Payer: COMMERCIAL

## 2020-10-19 DIAGNOSIS — R31.29 HEMATURIA, MICROSCOPIC: ICD-10-CM

## 2020-10-19 DIAGNOSIS — N20.1 OBSTRUCTION OF RIGHT URETEROPELVIC JUNCTION (UPJ) DUE TO STONE: ICD-10-CM

## 2020-10-19 DIAGNOSIS — N20.0 RIGHT KIDNEY STONE: ICD-10-CM

## 2020-10-19 DIAGNOSIS — Z01.818 PREOP EXAMINATION: ICD-10-CM

## 2020-10-19 DIAGNOSIS — N20.1 OBSTRUCTION OF RIGHT URETEROPELVIC JUNCTION (UPJ) DUE TO STONE: Primary | ICD-10-CM

## 2020-10-19 PROCEDURE — 74018 RADEX ABDOMEN 1 VIEW: CPT | Mod: TC

## 2020-10-19 PROCEDURE — 74018 RADEX ABDOMEN 1 VIEW: CPT | Mod: 26,,, | Performed by: RADIOLOGY

## 2020-10-19 PROCEDURE — 74018 XR ABDOMEN AP 1 VIEW: ICD-10-PCS | Mod: 26,,, | Performed by: RADIOLOGY

## 2020-10-19 NOTE — TELEPHONE ENCOUNTER
KUB 10/19/20  There is a 0.3 cm calcified stone projecting over the inferior aspect of the right renal pelvis.  This corresponds to the stone seen by CT October 13, 2020, unchanged.    Obstruction of right ureteropelvic junction (UPJ) due to stone    talked the pt regarding the findings.  Because of his anatomy, I am not sure that he is able to pass the stone spontaneously, although the stone size is reasonable for him to pass.  Will schedule right ESWL this week, with possible cysto right RPG and ureteral stent placement.  We can schedule him this Thurs pm or Fri AM depending on availability.  1.5 hour under MAC in cysto #3.

## 2020-10-19 NOTE — TELEPHONE ENCOUNTER
Obstruction of right ureteropelvic junction (UPJ) due to stone  -     Case Request Operating Room: LITHOTRIPSY, ESWL, PYELOGRAM, RETROGRADE, CYSTOSCOPY, WITH URETERAL STENT INSERTION  -     COVID-19 Routine Screening; Future; Expected date: 10/20/2020    Hematuria, microscopic  -     Case Request Operating Room: LITHOTRIPSY, ESWL, PYELOGRAM, RETROGRADE, CYSTOSCOPY, WITH URETERAL STENT INSERTION  -     COVID-19 Routine Screening; Future; Expected date: 10/20/2020    Right kidney stone  -     Case Request Operating Room: LITHOTRIPSY, ESWL, PYELOGRAM, RETROGRADE, CYSTOSCOPY, WITH URETERAL STENT INSERTION  -     COVID-19 Routine Screening; Future; Expected date: 10/20/2020    Preop examination  -     COVID-19 Routine Screening; Future; Expected date: 10/20/2020

## 2020-10-22 ENCOUNTER — TELEPHONE (OUTPATIENT)
Dept: UROLOGY | Facility: CLINIC | Age: 45
End: 2020-10-22

## 2020-10-23 ENCOUNTER — ANESTHESIA EVENT (OUTPATIENT)
Dept: SURGERY | Facility: HOSPITAL | Age: 45
End: 2020-10-23
Payer: COMMERCIAL

## 2020-10-23 ENCOUNTER — ANESTHESIA (OUTPATIENT)
Dept: SURGERY | Facility: HOSPITAL | Age: 45
End: 2020-10-23
Payer: COMMERCIAL

## 2020-10-23 ENCOUNTER — TELEPHONE (OUTPATIENT)
Dept: UROLOGY | Facility: CLINIC | Age: 45
End: 2020-10-23

## 2020-10-23 ENCOUNTER — HOSPITAL ENCOUNTER (OUTPATIENT)
Facility: HOSPITAL | Age: 45
Discharge: HOME | End: 2020-10-23
Attending: UROLOGY | Admitting: UROLOGY
Payer: COMMERCIAL

## 2020-10-23 ENCOUNTER — PATIENT MESSAGE (OUTPATIENT)
Dept: UROLOGY | Facility: CLINIC | Age: 45
End: 2020-10-23

## 2020-10-23 VITALS
SYSTOLIC BLOOD PRESSURE: 130 MMHG | TEMPERATURE: 99 F | WEIGHT: 190 LBS | RESPIRATION RATE: 16 BRPM | OXYGEN SATURATION: 100 % | HEART RATE: 74 BPM | HEIGHT: 68 IN | DIASTOLIC BLOOD PRESSURE: 89 MMHG | BODY MASS INDEX: 28.79 KG/M2

## 2020-10-23 DIAGNOSIS — N20.0 NEPHROLITHIASIS: ICD-10-CM

## 2020-10-23 DIAGNOSIS — N20.1 OBSTRUCTION OF RIGHT URETEROPELVIC JUNCTION (UPJ) DUE TO STONE: Primary | ICD-10-CM

## 2020-10-23 LAB — SARS-COV-2 RDRP RESP QL NAA+PROBE: NEGATIVE

## 2020-10-23 PROCEDURE — 63600175 PHARM REV CODE 636 W HCPCS: Performed by: STUDENT IN AN ORGANIZED HEALTH CARE EDUCATION/TRAINING PROGRAM

## 2020-10-23 PROCEDURE — 71000015 HC POSTOP RECOV 1ST HR: Performed by: UROLOGY

## 2020-10-23 PROCEDURE — 71000044 HC DOSC ROUTINE RECOVERY FIRST HOUR: Performed by: UROLOGY

## 2020-10-23 PROCEDURE — 37000009 HC ANESTHESIA EA ADD 15 MINS: Performed by: UROLOGY

## 2020-10-23 PROCEDURE — 50590 PR FRAGMENT KIDNEY STONE/ ESWL: ICD-10-PCS | Mod: RT,,, | Performed by: UROLOGY

## 2020-10-23 PROCEDURE — 37000008 HC ANESTHESIA 1ST 15 MINUTES: Performed by: UROLOGY

## 2020-10-23 PROCEDURE — D9220A PRA ANESTHESIA: Mod: CRNA,,, | Performed by: NURSE ANESTHETIST, CERTIFIED REGISTERED

## 2020-10-23 PROCEDURE — 63600175 PHARM REV CODE 636 W HCPCS: Performed by: NURSE ANESTHETIST, CERTIFIED REGISTERED

## 2020-10-23 PROCEDURE — D9220A PRA ANESTHESIA: Mod: ANES,,, | Performed by: ANESTHESIOLOGY

## 2020-10-23 PROCEDURE — D9220A PRA ANESTHESIA: ICD-10-PCS | Mod: ANES,,, | Performed by: ANESTHESIOLOGY

## 2020-10-23 PROCEDURE — 74400 UROGRAPHY IV +-KUB TOMOG: CPT | Mod: 26,,, | Performed by: UROLOGY

## 2020-10-23 PROCEDURE — 25000003 PHARM REV CODE 250: Performed by: UROLOGY

## 2020-10-23 PROCEDURE — 50590 FRAGMENTING OF KIDNEY STONE: CPT | Mod: RT,,, | Performed by: UROLOGY

## 2020-10-23 PROCEDURE — D9220A PRA ANESTHESIA: ICD-10-PCS | Mod: CRNA,,, | Performed by: NURSE ANESTHETIST, CERTIFIED REGISTERED

## 2020-10-23 PROCEDURE — 25000003 PHARM REV CODE 250: Performed by: STUDENT IN AN ORGANIZED HEALTH CARE EDUCATION/TRAINING PROGRAM

## 2020-10-23 PROCEDURE — 25000003 PHARM REV CODE 250: Performed by: NURSE ANESTHETIST, CERTIFIED REGISTERED

## 2020-10-23 PROCEDURE — U0002 COVID-19 LAB TEST NON-CDC: HCPCS

## 2020-10-23 PROCEDURE — 36000705 HC OR TIME LEV I EA ADD 15 MIN: Performed by: UROLOGY

## 2020-10-23 PROCEDURE — 36000704 HC OR TIME LEV I 1ST 15 MIN: Performed by: UROLOGY

## 2020-10-23 PROCEDURE — 25500020 PHARM REV CODE 255: Performed by: NURSE ANESTHETIST, CERTIFIED REGISTERED

## 2020-10-23 PROCEDURE — 74400: ICD-10-PCS | Mod: 26,,, | Performed by: UROLOGY

## 2020-10-23 RX ORDER — ONDANSETRON 2 MG/ML
4 INJECTION INTRAMUSCULAR; INTRAVENOUS ONCE AS NEEDED
Status: DISCONTINUED | OUTPATIENT
Start: 2020-10-23 | End: 2020-10-23 | Stop reason: HOSPADM

## 2020-10-23 RX ORDER — PHENYLEPHRINE HYDROCHLORIDE 10 MG/ML
INJECTION INTRAVENOUS
Status: DISCONTINUED | OUTPATIENT
Start: 2020-10-23 | End: 2020-10-23

## 2020-10-23 RX ORDER — SODIUM CHLORIDE 9 MG/ML
INJECTION, SOLUTION INTRAVENOUS CONTINUOUS
Status: DISCONTINUED | OUTPATIENT
Start: 2020-10-23 | End: 2020-10-23 | Stop reason: HOSPADM

## 2020-10-23 RX ORDER — LIDOCAINE HYDROCHLORIDE 10 MG/ML
1 INJECTION, SOLUTION EPIDURAL; INFILTRATION; INTRACAUDAL; PERINEURAL ONCE
Status: COMPLETED | OUTPATIENT
Start: 2020-10-23 | End: 2020-10-23

## 2020-10-23 RX ORDER — HYDROCODONE BITARTRATE AND ACETAMINOPHEN 5; 325 MG/1; MG/1
1 TABLET ORAL EVERY 6 HOURS PRN
Qty: 5 TABLET | Refills: 0 | Status: SHIPPED | OUTPATIENT
Start: 2020-10-23 | End: 2022-06-22

## 2020-10-23 RX ORDER — HYDROMORPHONE HYDROCHLORIDE 1 MG/ML
0.2 INJECTION, SOLUTION INTRAMUSCULAR; INTRAVENOUS; SUBCUTANEOUS EVERY 5 MIN PRN
Status: DISCONTINUED | OUTPATIENT
Start: 2020-10-23 | End: 2020-10-23 | Stop reason: HOSPADM

## 2020-10-23 RX ORDER — CEFAZOLIN SODIUM 1 G/3ML
2 INJECTION, POWDER, FOR SOLUTION INTRAMUSCULAR; INTRAVENOUS
Status: COMPLETED | OUTPATIENT
Start: 2020-10-23 | End: 2020-10-23

## 2020-10-23 RX ORDER — PROPOFOL 10 MG/ML
VIAL (ML) INTRAVENOUS
Status: DISCONTINUED | OUTPATIENT
Start: 2020-10-23 | End: 2020-10-23

## 2020-10-23 RX ORDER — OXYCODONE AND ACETAMINOPHEN 5; 325 MG/1; MG/1
1 TABLET ORAL
Status: DISCONTINUED | OUTPATIENT
Start: 2020-10-23 | End: 2020-10-23 | Stop reason: HOSPADM

## 2020-10-23 RX ORDER — LIDOCAINE HYDROCHLORIDE 20 MG/ML
INJECTION INTRAVENOUS
Status: DISCONTINUED | OUTPATIENT
Start: 2020-10-23 | End: 2020-10-23

## 2020-10-23 RX ORDER — FENTANYL CITRATE 50 UG/ML
INJECTION, SOLUTION INTRAMUSCULAR; INTRAVENOUS
Status: DISCONTINUED | OUTPATIENT
Start: 2020-10-23 | End: 2020-10-23

## 2020-10-23 RX ORDER — MIDAZOLAM HYDROCHLORIDE 1 MG/ML
INJECTION, SOLUTION INTRAMUSCULAR; INTRAVENOUS
Status: DISCONTINUED | OUTPATIENT
Start: 2020-10-23 | End: 2020-10-23

## 2020-10-23 RX ORDER — ONDANSETRON 2 MG/ML
INJECTION INTRAMUSCULAR; INTRAVENOUS
Status: DISCONTINUED | OUTPATIENT
Start: 2020-10-23 | End: 2020-10-23

## 2020-10-23 RX ADMIN — LIDOCAINE HYDROCHLORIDE 100 MG: 20 INJECTION, SOLUTION INTRAVENOUS at 12:10

## 2020-10-23 RX ADMIN — FENTANYL CITRATE 50 MCG: 50 INJECTION, SOLUTION INTRAMUSCULAR; INTRAVENOUS at 12:10

## 2020-10-23 RX ADMIN — SODIUM CHLORIDE: 0.9 INJECTION, SOLUTION INTRAVENOUS at 11:10

## 2020-10-23 RX ADMIN — MIDAZOLAM HYDROCHLORIDE 2 MG: 1 INJECTION, SOLUTION INTRAMUSCULAR; INTRAVENOUS at 11:10

## 2020-10-23 RX ADMIN — PROPOFOL 50 MG: 10 INJECTION, EMULSION INTRAVENOUS at 12:10

## 2020-10-23 RX ADMIN — SODIUM CHLORIDE, SODIUM GLUCONATE, SODIUM ACETATE, POTASSIUM CHLORIDE, MAGNESIUM CHLORIDE, SODIUM PHOSPHATE, DIBASIC, AND POTASSIUM PHOSPHATE: .53; .5; .37; .037; .03; .012; .00082 INJECTION, SOLUTION INTRAVENOUS at 12:10

## 2020-10-23 RX ADMIN — ONDANSETRON 4 MG: 2 INJECTION, SOLUTION INTRAMUSCULAR; INTRAVENOUS at 01:10

## 2020-10-23 RX ADMIN — PHENYLEPHRINE HYDROCHLORIDE 100 MCG: 10 INJECTION INTRAVENOUS at 01:10

## 2020-10-23 RX ADMIN — IOHEXOL 100 ML: 300 INJECTION, SOLUTION INTRAVENOUS at 11:10

## 2020-10-23 RX ADMIN — PROPOFOL 70 MG: 10 INJECTION, EMULSION INTRAVENOUS at 12:10

## 2020-10-23 RX ADMIN — PROPOFOL 100 MG: 10 INJECTION, EMULSION INTRAVENOUS at 12:10

## 2020-10-23 RX ADMIN — LIDOCAINE HYDROCHLORIDE 0.3 MG: 10 INJECTION, SOLUTION EPIDURAL; INFILTRATION; INTRACAUDAL at 11:10

## 2020-10-23 RX ADMIN — PROPOFOL 150 MG: 10 INJECTION, EMULSION INTRAVENOUS at 12:10

## 2020-10-23 RX ADMIN — CEFAZOLIN 2 G: 330 INJECTION, POWDER, FOR SOLUTION INTRAMUSCULAR; INTRAVENOUS at 12:10

## 2020-10-23 RX ADMIN — PHENYLEPHRINE HYDROCHLORIDE 100 MCG: 10 INJECTION INTRAVENOUS at 12:10

## 2020-10-23 NOTE — ANESTHESIA PREPROCEDURE EVALUATION
[facility]  Anesthesia Pre-Operative Evaluation       Patient Name: Gautam Ross  YOB: 1975  MRN: [id number]  [facility]: [id number]      Code Status: Prior   Date of Procedure: 10/23/2020  Anesthesia: General/MAC Procedure: Procedure(s) (LRB):  LITHOTRIPSY, ESWL (Right)  PYELOGRAM, RETROGRADE (Right)  CYSTOSCOPY, WITH URETERAL STENT INSERTION (Right)  Pre-Operative Diagnosis: Obstruction of right ureteropelvic junction (UPJ) due to stone [N20.1]  Hematuria, microscopic [R31.29]  Right kidney stone [N20.0]  Proceduralist: Surgeon(s) and Role:     * Kirill Graves MD - Primary        SUBJECTIVE:   Gautam Ross is a 45 y.o. male who  has a past medical history of Acute appendicitis (5/13/2018), Gallbladder attack (11/15/2018), Pain in right shoulder (9/16/2019), and Postprocedural intraabdominal abscess (5/24/2018)..   Revised cardiac risk index (RCRI) score is 0.    he has a current medication list which includes the following long-term medication(s): fish oil-omega-3 fatty acids and tamsulosin.     ALLERGIES:   Review of patient's allergies indicates:  No Known Allergies  LDA:      Lines/Drains/Airways     None                Anesthesia Evaluation      Airway   Mallampati: I  TM distance: Normal, at least 6 cm  Dental    (+) In tact    Pulmonary    Cardiovascular     Neuro/Psych      GI/Hepatic/Renal      Endo/Other    Abdominal                  MEDICATIONS:     Antibiotics (From admission, onward)    Start     Stop Route Frequency Ordered    10/23/20 1034  ceFAZolin injection 2 g      -- IV On Call Procedure 10/23/20 1034        VTE Risk Mitigation (From admission, onward)         Ordered     IP VTE LOW RISK PATIENT  Once      10/23/20 1034     Place VIVIEN hose  Until discontinued      10/23/20 1034     Place sequential compression device  Until discontinued      10/23/20 1034              Current Facility-Administered Medications   Medication  Dose Route Frequency Provider Last Rate Last Dose    0.9%  NaCl infusion   Intravenous Continuous Elina Yan MD        ceFAZolin injection 2 g  2 g Intravenous On Call Procedure Elina Yan MD              History:     Active Hospital Problems    Diagnosis  POA    Nephrolithiasis [N20.0]  Yes      Resolved Hospital Problems   No resolved problems to display.     Surgical History:    has a past surgical history that includes Umbilical hernia repair (10/16/13); LASIK (Bilateral); Appendectomy; and Laparoscopic cholecystectomy (N/A, 11/15/2018).   Social History:    has no history on file for sexual activity.  reports that he quit smoking about 18 years ago. He has never used smokeless tobacco. He reports that he does not drink alcohol or use drugs.     OBJECTIVE:     Vital Signs (Most Recent):    Vital Signs Range (Last 24H):          There is no height or weight on file to calculate BMI.   Wt Readings from Last 4 Encounters:   10/15/20 90.4 kg (199 lb 6.1 oz)   10/15/20 90.4 kg (199 lb 4.7 oz)   10/13/20 86.2 kg (190 lb)   10/13/20 86.6 kg (191 lb)     Significant Labs:  Lab Results   Component Value Date    WBC 7.51 10/13/2020    HGB 15.8 10/13/2020    HCT 46.5 10/13/2020     10/13/2020     10/13/2020    K 3.9 10/13/2020     10/13/2020    CREATININE 1.2 10/13/2020    BUN 24 (H) 10/13/2020    CO2 27 10/13/2020     10/13/2020    CALCIUM 9.3 10/13/2020    MG 2.2 05/28/2018    PHOS 3.2 05/27/2018    ALKPHOS 102 10/13/2020    ALT 48 (H) 10/13/2020    AST 33 10/13/2020    ALBUMIN 4.4 10/13/2020    INR 1.1 05/25/2018     (H) 10/15/2020     No results found for this or any previous visit (from the past 72 hour(s)).    EKG:   No results found for this or any previous visit.    ASSESSMENT/PLAN:       Pre-op Assessment    I have reviewed the Patient Summary Reports.    I have reviewed the Nursing Notes.    I have reviewed the Medications.     Review of Systems  Anesthesia  Hx:  History of prior surgery of interest to airway management or planning: Denies Family Hx of Anesthesia complications.   Denies Personal Hx of Anesthesia complications.   Social:  Non-Smoker    Hematology/Oncology:  Hematology Normal   Oncology Normal     EENT/Dental:   chronic allergic rhinitis   Cardiovascular:  Cardiovascular Normal     Pulmonary:  Pulmonary Normal    Renal/:  Renal/ Normal     Hepatic/GI:  Hepatic/GI Normal    Musculoskeletal:  Musculoskeletal Normal    Neurological:  Neurology Normal    Endocrine:  Endocrine Normal    Dermatological:  Skin Normal    Psych:  Psychiatric Normal           Physical Exam  General:  Well nourished    Airway/Jaw/Neck:  Airway Findings: Mouth Opening: Normal Tongue: Normal  General Airway Assessment: Adult  Mallampati: I  TM Distance: Normal, at least 6 cm      Dental:  Dental Findings: In tact   Chest/Lungs:  Chest/Lungs Findings: Clear to auscultation         Mental Status:  Mental Status Findings:  Alert and Oriented, Cooperative         Anesthesia Plan  Type of Anesthesia, risks & benefits discussed:  Anesthesia Type:  general  Patient's Preference:   Intra-op Monitoring Plan: standard ASA monitors  Intra-op Monitoring Plan Comments:   Post Op Pain Control Plan: multimodal analgesia  Post Op Pain Control Plan Comments:   Induction:   IV  Beta Blocker:  Patient is not currently on a Beta-Blocker (No further documentation required).       Informed Consent: Patient understands risks and agrees with Anesthesia plan.  Questions answered. Anesthesia consent signed with patient.  ASA Score: 2     Day of Surgery Review of History & Physical:    H&P update referred to the surgeon.  H&P completed by Anesthesiologist.   Anesthesia Plan Notes: Chart reviewed, patient interviewed and examined.  The anesthetic plan was explained.  Risks, benefits, and alternatives were discussed. Questions were answered and the consent was signed.        BRANDON Adair For  Surgery From Anesthesia Perspective.

## 2020-10-23 NOTE — TELEPHONE ENCOUNTER
----- Message from Elina Yan MD sent at 10/23/2020 12:38 PM CDT -----  Please have patient follow up in 1 month with Dr. Graves with a CT RSS prior. Thank you

## 2020-10-23 NOTE — ANESTHESIA POSTPROCEDURE EVALUATION
Anesthesia Post Evaluation    Patient: Gautam Ross    Procedure(s) Performed: Procedure(s) (LRB):  LITHOTRIPSY, ESWL (Right)  PYELOGRAM, INTRAVENOUS (N/A)    Final Anesthesia Type: general    Patient location during evaluation: PACU  Patient participation: Yes- Able to Participate  Level of consciousness: awake and alert  Post-procedure vital signs: reviewed and stable  Pain management: adequate  Airway patency: patent    PONV status at discharge: No PONV  Anesthetic complications: no      Cardiovascular status: blood pressure returned to baseline  Respiratory status: unassisted  Hydration status: euvolemic  Follow-up not needed.          Vitals Value Taken Time   /89 10/23/20 1429   Temp 37.1 °C (98.7 °F) 10/23/20 1429   Pulse 74 10/23/20 1429   Resp 16 10/23/20 1429   SpO2 100 % 10/23/20 1429         No case tracking events are documented in the log.      Pain/Marily Score: No data recorded

## 2020-10-23 NOTE — OP NOTE
[facility]  Operative Note    Date: 10/23/2020    Pre-Op Diagnosis: Right renal stone    Post-Op Diagnosis: same    Procedure(s) Performed:   1.  Right ESWL    Specimen(s): none    Staff Surgeon: Kirill Graves MD    Assistant Surgeon: Elina Yan MD    Anesthesia: Monitored Local Anesthesia with Sedation    Indications: Gautam Ross is a 45 y.o. male with a  right renal stone presenting for definitive stone management.  The stone is radio-opaque on KUB.      Findings:   Stone difficult to see on x-ray, so IVP was used to help locate the stone which was found in the lower pole of the right kidney    Estimated Blood Loss: none    Drains: None    Procedure in Detail:  After risk, benefits, and possible complications of ESWL were discussed, the patient elected to undergo the procedure and informed consent was obtained. All questions were answered in the pre-operative area and the patient was transferred to the lithotripsy suite and placed on the lithotriptor table. SCDs were applied and working.  Time out was preformed, danilo-procedural antibiotics were administered.    The patient's Right-sided stone was aligned on the X-Y- and Z axis. At this point the stone was difficult to locate so an intravenous pyelogram was used to locate the stone which had moved to the lower pole. MAC anesthesia was administered.  When the patient was adequately sedated, 3000 thousand shocks were administered at a rate of 1 shocks per second, beginning at 16 KV of power and advanced to 26 KV. Intermittent fluoroscopy was used to monitor fragmentation of the stone.    At the end of the procedure the stone seemed to have fragmented.      The patient tolerated the procedure well and was transferred to the PACU in stable condition.      Plan: The patient will follow up with Dr. Graves in 4 weeks with a CT RSS prior.  The patient was given prescriptions for Norco.  The patient will strain all urine and bring any  fragments to the follow up appt for stone analysis.      Elina Yan MD

## 2020-10-23 NOTE — TRANSFER OF CARE
"Anesthesia Transfer of Care Note    Patient: Gautam Ross    Procedure(s) Performed: Procedure(s) (LRB):  LITHOTRIPSY, ESWL (Right)  PYELOGRAM, INTRAVENOUS (N/A)    Patient location: PACU    Anesthesia Type: general    Transport from OR: Transported from OR on 6-10 L/min O2 by face mask with adequate spontaneous ventilation    Post pain: adequate analgesia    Post assessment: no apparent anesthetic complications and tolerated procedure well    Post vital signs: stable    Level of consciousness: sedated and responds to stimulation    Nausea/Vomiting: no nausea/vomiting    Complications: none    Transfer of care protocol was followed      Last vitals:   Visit Vitals  /78 (BP Location: Left arm, Patient Position: Sitting)   Pulse 72   Resp 16   Ht 5' 8" (1.727 m)   Wt 86.2 kg (190 lb)   SpO2 99%   BMI 28.89 kg/m²     "

## 2020-10-23 NOTE — PROGRESS NOTES
Discharge instructions reviewed w/ pt and wife, verbalized understanding. Pt in NADn.No complaints at this time. Tolerated liquids w/ no issues. To be d/c'd home w/ wife. Urinated w /no issues.

## 2020-10-23 NOTE — DISCHARGE SUMMARY
[facility]  Discharge Note  Short Stay    Admit Date: 10/23/2020    Discharge Date and Time: 10/23/2020 12:38 PM      Attending Physician: Kirill Graves MD     Discharge Provider: Elina Yan    Diagnoses:  Active Hospital Problems    Diagnosis  POA    *Nephrolithiasis [N20.0]  Yes      Resolved Hospital Problems   No resolved problems to display.       Discharged Condition: good    Hospital Course: Patient was admitted for R ESWL and tolerated the procedure well with no complications. The patient was discharged home in good condition on the same day.       Final Diagnoses: Same as principal problem.    Disposition: Home or Self Care    Follow up/Patient Instructions:    Medications:  Reconciled Home Medications:   Current Discharge Medication List      START taking these medications    Details   HYDROcodone-acetaminophen (NORCO) 5-325 mg per tablet Take 1 tablet by mouth every 6 (six) hours as needed for Pain.  Qty: 5 tablet, Refills: 0    Comments: Quantity prescribed more than 7 day supply? No         CONTINUE these medications which have NOT CHANGED    Details   fluticasone (FLONASE) 50 mcg/actuation nasal spray 1 spray by Each Nare route once daily. Each nostril daily      multivitamin (ONE DAILY MULTIVITAMIN) per tablet Take 1 tablet by mouth once daily.      tamsulosin (FLOMAX) 0.4 mg Cap Take 1 capsule (0.4 mg total) by mouth every evening.  Qty: 30 capsule, Refills: 0    Associated Diagnoses: Right kidney stone      fish oil-omega-3 fatty acids 300-1,000 mg capsule Take by mouth once daily.      meloxicam (MOBIC) 15 MG tablet Take 1 tablet (15 mg total) by mouth once daily.  Qty: 30 tablet, Refills: 2    Associated Diagnoses: Right shoulder pain, unspecified chronicity      ondansetron (ZOFRAN-ODT) 4 MG TbDL Take 1 tablet (4 mg total) by mouth every 6 (six) hours as needed (nausea).  Qty: 10 tablet, Refills: 0      UNABLE TO FIND replenex-joint supplement           Discharge Procedure  Orders   CT Renal Stone Study ABD Pelvis WO   Standing Status: Future Standing Exp. Date: 10/23/21     Order Specific Question Answer Comments   May the Radiologist modify the order per protocol to meet the clinical needs of the patient? Yes      Diet Adult Regular     Notify your health care provider if you experience any of the following:  temperature >100.4     Notify your health care provider if you experience any of the following:  persistent nausea and vomiting or diarrhea     Notify your health care provider if you experience any of the following:  severe uncontrolled pain     Notify your health care provider if you experience any of the following:  redness, tenderness, or signs of infection (pain, swelling, redness, odor or green/yellow discharge around incision site)     Activity as tolerated     Follow-up Information     Kirill Graves MD In 1 month.    Specialty: Urology  Why: post op, with CT RSS prior  Contact information:  [address] [facility] [id number]  [phone]                   Discharge Procedure Orders (must include Diet, Follow-up, Activity):   Discharge Procedure Orders (must include Diet, Follow-up, Activity)   CT Renal Stone Study ABD Pelvis WO   Standing Status: Future Standing Exp. Date: 10/23/21     Order Specific Question Answer Comments   May the Radiologist modify the order per protocol to meet the clinical needs of the patient? Yes      Diet Adult Regular     Notify your health care provider if you experience any of the following:  temperature >100.4     Notify your health care provider if you experience any of the following:  persistent nausea and vomiting or diarrhea     Notify your health care provider if you experience any of the following:  severe uncontrolled pain     Notify your health care provider if you experience any of the following:  redness, tenderness, or signs of infection (pain, swelling, redness, odor or green/yellow discharge around incision site)      Activity as tolerated

## 2020-11-02 ENCOUNTER — PATIENT MESSAGE (OUTPATIENT)
Dept: UROLOGY | Facility: CLINIC | Age: 45
End: 2020-11-02

## 2020-11-20 ENCOUNTER — OFFICE VISIT (OUTPATIENT)
Dept: UROLOGY | Facility: CLINIC | Age: 45
End: 2020-11-20
Payer: COMMERCIAL

## 2020-11-20 ENCOUNTER — HOSPITAL ENCOUNTER (OUTPATIENT)
Dept: RADIOLOGY | Facility: HOSPITAL | Age: 45
Discharge: HOME | End: 2020-11-20
Attending: FAMILY MEDICINE
Payer: COMMERCIAL

## 2020-11-20 DIAGNOSIS — N20.0 RIGHT KIDNEY STONE: Primary | ICD-10-CM

## 2020-11-20 DIAGNOSIS — N20.0 NEPHROLITHIASIS: ICD-10-CM

## 2020-11-20 DIAGNOSIS — N20.1 OBSTRUCTION OF RIGHT URETEROPELVIC JUNCTION (UPJ) DUE TO STONE: ICD-10-CM

## 2020-11-20 PROCEDURE — 74176 CT ABD & PELVIS W/O CONTRAST: CPT | Mod: 26,,, | Performed by: RADIOLOGY

## 2020-11-20 PROCEDURE — 74176 CT RENAL STONE STUDY ABD PELVIS WO: ICD-10-PCS | Mod: 26,,, | Performed by: RADIOLOGY

## 2020-11-20 PROCEDURE — 74176 CT ABD & PELVIS W/O CONTRAST: CPT | Mod: TC

## 2020-11-20 PROCEDURE — 99499 NO LOS: ICD-10-PCS | Mod: 95,,, | Performed by: UROLOGY

## 2020-11-20 PROCEDURE — 99499 UNLISTED E&M SERVICE: CPT | Mod: 95,,, | Performed by: UROLOGY

## 2020-11-20 NOTE — PROGRESS NOTES
Established Patient - Audio Only Telehealth Visit     The patient location is: home  The chief complaint leading to consultation is: s/p right ESWL for right UPJ stone  Visit type: Virtual visit with audio only (telephone)  Total time spent with patient: 5 min       The reason for the audio only service rather than synchronous audio and video virtual visit was related to technical difficulties or patient preference/necessity.     Each patient to whom I provide medical services by telemedicine is:  (1) informed of the relationship between the physician and patient and the respective role of any other health care provider with respect to management of the patient; and (2) notified that they may decline to receive medical services by telemedicine and may withdraw from such care at any time. Patient verbally consented to receive this service via voice-only telephone call.       HPI:   Gautam Ross is a 45 y.o. male with a  right renal stone presenting for definitive stone management.  The stone is radio-opaque on KUB.    He underwent right ESWL and is here for a follow up   Because the stone was rather difficult to visualize on his previous KUB, we decided to do a CT RSS.    Date: 10/23/2020  Pre-Op Diagnosis: Right renal stone  Post-Op Diagnosis: same  Procedure(s) Performed:   1.  Right ESWL  Findings:   Stone difficult to see on x-ray, so IVP was used to help locate the stone which was found in the lower pole of the right kidney    CT RSS today  I reviewed the study and found that there is no residual stone, hydronephrosis found.     Assessment and plan:    Right kidney stone    S/p right ESWL  Successful outcome  Encourage pt to drink plenty of water to maintain 2 liters of UO a day.  High amount of citric acid intake recommended.  I was not able to reach him by a phone this morning and left a message.    Follow up if symptoms worsen or fail to improve.     This service was not originating from a related  E/M service provided within the previous 7 days nor will  to an E/M service or procedure within the next 24 hours or my soonest available appointment.  Prevailing standard of care was able to be met in this audio-only visit.

## 2021-04-15 ENCOUNTER — PATIENT MESSAGE (OUTPATIENT)
Dept: RESEARCH | Facility: HOSPITAL | Age: 46
End: 2021-04-15

## 2021-07-27 ENCOUNTER — OFFICE VISIT (OUTPATIENT)
Dept: URGENT CARE | Facility: CLINIC | Age: 46
End: 2021-07-27
Payer: COMMERCIAL

## 2021-07-27 VITALS
HEART RATE: 78 BPM | WEIGHT: 190 LBS | SYSTOLIC BLOOD PRESSURE: 122 MMHG | OXYGEN SATURATION: 97 % | TEMPERATURE: 99 F | BODY MASS INDEX: 28.79 KG/M2 | HEIGHT: 68 IN | RESPIRATION RATE: 14 BRPM | DIASTOLIC BLOOD PRESSURE: 82 MMHG

## 2021-07-27 DIAGNOSIS — R09.81 NASAL CONGESTION: ICD-10-CM

## 2021-07-27 DIAGNOSIS — J40 BRONCHITIS: Primary | ICD-10-CM

## 2021-07-27 LAB
CTP QC/QA: YES
SARS-COV-2 RDRP RESP QL NAA+PROBE: NEGATIVE

## 2021-07-27 PROCEDURE — 99213 OFFICE O/P EST LOW 20 MIN: CPT | Mod: S$GLB,,, | Performed by: NURSE PRACTITIONER

## 2021-07-27 PROCEDURE — 1159F PR MEDICATION LIST DOCUMENTED IN MEDICAL RECORD: ICD-10-PCS | Mod: CPTII,S$GLB,, | Performed by: NURSE PRACTITIONER

## 2021-07-27 PROCEDURE — 3008F BODY MASS INDEX DOCD: CPT | Mod: CPTII,S$GLB,, | Performed by: NURSE PRACTITIONER

## 2021-07-27 PROCEDURE — 1160F RVW MEDS BY RX/DR IN RCRD: CPT | Mod: CPTII,S$GLB,, | Performed by: NURSE PRACTITIONER

## 2021-07-27 PROCEDURE — 1159F MED LIST DOCD IN RCRD: CPT | Mod: CPTII,S$GLB,, | Performed by: NURSE PRACTITIONER

## 2021-07-27 PROCEDURE — 3008F PR BODY MASS INDEX (BMI) DOCUMENTED: ICD-10-PCS | Mod: CPTII,S$GLB,, | Performed by: NURSE PRACTITIONER

## 2021-07-27 PROCEDURE — 1160F PR REVIEW ALL MEDS BY PRESCRIBER/CLIN PHARMACIST DOCUMENTED: ICD-10-PCS | Mod: CPTII,S$GLB,, | Performed by: NURSE PRACTITIONER

## 2021-07-27 PROCEDURE — U0002 COVID-19 LAB TEST NON-CDC: HCPCS | Mod: QW,S$GLB,, | Performed by: NURSE PRACTITIONER

## 2021-07-27 PROCEDURE — 99213 PR OFFICE/OUTPT VISIT, EST, LEVL III, 20-29 MIN: ICD-10-PCS | Mod: S$GLB,,, | Performed by: NURSE PRACTITIONER

## 2021-07-27 PROCEDURE — U0002: ICD-10-PCS | Mod: QW,S$GLB,, | Performed by: NURSE PRACTITIONER

## 2021-07-27 RX ORDER — DOXYCYCLINE 100 MG/1
100 CAPSULE ORAL EVERY 12 HOURS
Qty: 14 CAPSULE | Refills: 0 | Status: SHIPPED | OUTPATIENT
Start: 2021-07-27 | End: 2021-08-03

## 2021-08-26 ENCOUNTER — CLINICAL SUPPORT (OUTPATIENT)
Dept: URGENT CARE | Facility: CLINIC | Age: 46
End: 2021-08-26
Payer: COMMERCIAL

## 2021-08-26 DIAGNOSIS — Z20.822 ENCOUNTER FOR LABORATORY TESTING FOR COVID-19 VIRUS: Primary | ICD-10-CM

## 2021-08-26 LAB
CTP QC/QA: YES
SARS-COV-2 RDRP RESP QL NAA+PROBE: NEGATIVE

## 2021-08-26 PROCEDURE — 99211 PR OFFICE/OUTPT VISIT, EST, LEVL I: ICD-10-PCS | Mod: S$GLB,CS,, | Performed by: NURSE PRACTITIONER

## 2021-08-26 PROCEDURE — U0002: ICD-10-PCS | Mod: QW,S$GLB,, | Performed by: NURSE PRACTITIONER

## 2021-08-26 PROCEDURE — U0002 COVID-19 LAB TEST NON-CDC: HCPCS | Mod: QW,S$GLB,, | Performed by: NURSE PRACTITIONER

## 2021-08-26 PROCEDURE — 99211 OFF/OP EST MAY X REQ PHY/QHP: CPT | Mod: S$GLB,CS,, | Performed by: NURSE PRACTITIONER

## 2021-11-09 ENCOUNTER — PATIENT MESSAGE (OUTPATIENT)
Dept: OPTOMETRY | Facility: CLINIC | Age: 46
End: 2021-11-09
Payer: COMMERCIAL

## 2022-01-14 ENCOUNTER — PATIENT MESSAGE (OUTPATIENT)
Dept: OPTOMETRY | Facility: CLINIC | Age: 47
End: 2022-01-14
Payer: COMMERCIAL

## 2022-01-18 ENCOUNTER — OFFICE VISIT (OUTPATIENT)
Dept: OPTOMETRY | Facility: CLINIC | Age: 47
End: 2022-01-18
Payer: COMMERCIAL

## 2022-01-18 DIAGNOSIS — Z97.3 WEARS CONTACT LENSES: ICD-10-CM

## 2022-01-18 DIAGNOSIS — H52.203 MYOPIA OF BOTH EYES WITH ASTIGMATISM AND PRESBYOPIA: Primary | ICD-10-CM

## 2022-01-18 DIAGNOSIS — H52.4 MYOPIA OF BOTH EYES WITH ASTIGMATISM AND PRESBYOPIA: Primary | ICD-10-CM

## 2022-01-18 DIAGNOSIS — Z98.890 HX OF LASIK: ICD-10-CM

## 2022-01-18 DIAGNOSIS — H52.13 MYOPIA OF BOTH EYES WITH ASTIGMATISM AND PRESBYOPIA: Primary | ICD-10-CM

## 2022-01-18 DIAGNOSIS — Z46.0 FITTING AND ADJUSTMENT OF SPECTACLES AND CONTACT LENSES: Primary | ICD-10-CM

## 2022-01-18 PROCEDURE — 92310 PR CONTACT LENS FITTING (NO CHANGE): ICD-10-PCS | Mod: S$GLB,,, | Performed by: OPTOMETRIST

## 2022-01-18 PROCEDURE — 99999 PR PBB SHADOW E&M-EST. PATIENT-LVL II: ICD-10-PCS | Mod: PBBFAC,,, | Performed by: OPTOMETRIST

## 2022-01-18 PROCEDURE — 92310 CONTACT LENS FITTING OU: CPT | Mod: S$GLB,,, | Performed by: OPTOMETRIST

## 2022-01-18 PROCEDURE — 92015 DETERMINE REFRACTIVE STATE: CPT | Mod: S$GLB,,, | Performed by: OPTOMETRIST

## 2022-01-18 PROCEDURE — 92015 PR REFRACTION: ICD-10-PCS | Mod: S$GLB,,, | Performed by: OPTOMETRIST

## 2022-01-18 PROCEDURE — 99999 PR PBB SHADOW E&M-EST. PATIENT-LVL II: CPT | Mod: PBBFAC,,, | Performed by: OPTOMETRIST

## 2022-01-18 PROCEDURE — 92012 PR EYE EXAM, EST PATIENT,INTERMED: ICD-10-PCS | Mod: S$GLB,,, | Performed by: OPTOMETRIST

## 2022-01-18 PROCEDURE — 92012 INTRM OPH EXAM EST PATIENT: CPT | Mod: S$GLB,,, | Performed by: OPTOMETRIST

## 2022-01-18 NOTE — PROGRESS NOTES
HPI     DLS: 8/17/2020    Presents today for annual/CLFU - 1 Day Acuvue Moist for Astig  No vision complaints.  No f/f  No gtts  S/P LASIK OU      Last edited by Marge Graham MA on 1/18/2022  2:24 PM. (History)        ROS     Positive for: Eyes (LASIK OU)    Negative for: Constitutional, Gastrointestinal, Neurological, Skin,   Genitourinary, Musculoskeletal, HENT, Endocrine, Cardiovascular,   Respiratory, Psychiatric, Allergic/Imm, Heme/Lymph    Last edited by Kolby Camacho, OD on 1/18/2022  2:25 PM. (History)        Assessment /Plan     For exam results, see Encounter Report.    Myopia of both eyes with astigmatism and presbyopia    Hx of LASIK    Wears contact lenses        1. Residual myopia/astig/presby sp LASIK 15 years ago.  Pt wearing ONE DAY AV MOIST ASTIG Cls w good fit, but needs slight Rx adjustment OD.  Wears otc readers when needed  2. Pt wishes to defer DFE today--has to  kids at school    PLAN:    1. Sent pt to [facility] to get TRIALS in [place].  I do NOT need to see at disp  2. Cont DW sched--exchange nightly  3.  If no problems will call for CLRx, rtc 1 yr

## 2022-01-25 ENCOUNTER — TELEPHONE (OUTPATIENT)
Dept: OPTOMETRY | Facility: CLINIC | Age: 47
End: 2022-01-25
Payer: COMMERCIAL

## 2022-01-25 NOTE — TELEPHONE ENCOUNTER
----- Message from Kathy Cabrales sent at 1/24/2022  2:25 PM CST -----  This patient wife picked up his trials today.    Kathy

## 2022-01-26 ENCOUNTER — PATIENT MESSAGE (OUTPATIENT)
Dept: OPTOMETRY | Facility: CLINIC | Age: 47
End: 2022-01-26
Payer: COMMERCIAL

## 2022-05-30 ENCOUNTER — PATIENT MESSAGE (OUTPATIENT)
Dept: ADMINISTRATIVE | Facility: HOSPITAL | Age: 47
End: 2022-05-30
Payer: COMMERCIAL

## 2022-06-22 ENCOUNTER — OFFICE VISIT (OUTPATIENT)
Dept: PRIMARY CARE CLINIC | Facility: CLINIC | Age: 47
End: 2022-06-22
Payer: COMMERCIAL

## 2022-06-22 DIAGNOSIS — R11.2 NAUSEA AND VOMITING IN ADULT: ICD-10-CM

## 2022-06-22 DIAGNOSIS — R19.7 DIARRHEA, UNSPECIFIED TYPE: Primary | ICD-10-CM

## 2022-06-22 PROCEDURE — 1160F PR REVIEW ALL MEDS BY PRESCRIBER/CLIN PHARMACIST DOCUMENTED: ICD-10-PCS | Mod: CPTII,95,, | Performed by: NURSE PRACTITIONER

## 2022-06-22 PROCEDURE — 1159F PR MEDICATION LIST DOCUMENTED IN MEDICAL RECORD: ICD-10-PCS | Mod: CPTII,95,, | Performed by: NURSE PRACTITIONER

## 2022-06-22 PROCEDURE — 1160F RVW MEDS BY RX/DR IN RCRD: CPT | Mod: CPTII,95,, | Performed by: NURSE PRACTITIONER

## 2022-06-22 PROCEDURE — 1159F MED LIST DOCD IN RCRD: CPT | Mod: CPTII,95,, | Performed by: NURSE PRACTITIONER

## 2022-06-22 PROCEDURE — 99213 OFFICE O/P EST LOW 20 MIN: CPT | Mod: 95,,, | Performed by: NURSE PRACTITIONER

## 2022-06-22 PROCEDURE — 99213 PR OFFICE/OUTPT VISIT, EST, LEVL III, 20-29 MIN: ICD-10-PCS | Mod: 95,,, | Performed by: NURSE PRACTITIONER

## 2022-06-22 RX ORDER — ONDANSETRON 4 MG/1
4 TABLET, ORALLY DISINTEGRATING ORAL EVERY 6 HOURS PRN
Qty: 20 TABLET | Refills: 0 | Status: SHIPPED | OUTPATIENT
Start: 2022-06-22 | End: 2022-06-27

## 2022-06-22 RX ORDER — DICYCLOMINE HYDROCHLORIDE 10 MG/1
10 CAPSULE ORAL
Qty: 20 CAPSULE | Refills: 0 | Status: SHIPPED | OUTPATIENT
Start: 2022-06-22 | End: 2022-07-22

## 2022-06-22 NOTE — PROGRESS NOTES
The patient location is: [place]  The chief complaint leading to consultation is: diarrhea    Visit type: audiovisual    Face to Face time with patient: 12  22 minutes of total time spent on the encounter, which includes face to face time and non-face to face time preparing to see the patient (eg, review of tests), Obtaining and/or reviewing separately obtained history, Documenting clinical information in the electronic or other health record, Independently interpreting results (not separately reported) and communicating results to the patient/family/caregiver, or Care coordination (not separately reported).         Each patient to whom he or she provides medical services by telemedicine is:  (1) informed of the relationship between the physician and patient and the respective role of any other health care provider with respect to management of the patient; and (2) notified that he or she may decline to receive medical services by telemedicine and may withdraw from such care at any time.    Notes:   Subjective:       Patient ID: Gautam Ross is a 47 y.o. male.    Chief Complaint: Diarrhea    Mr. Gautam Ross is a 47 year old male, new to me, presents via telemedicine with diarrhea. PCP is Nan Esquivel. Medical and surgical history in addition to problem list reviewed as listed below.    Patient presents with complaints of diarrhea that started on Monday, June 20, 2022. On Monday patient experience two episodes of diarrhea,  once in the morning and at night. Patient states on Tuesday it intensified to every thirty minutes and hence has been throughout the day.     Diarrhea   The current episode started in the past 7 days. The problem occurs more than 10 times per day. The problem has been gradually worsening. The stool consistency is described as watery. The patient states that diarrhea awakens him from sleep. Associated symptoms include abdominal pain and increased flatus. Pertinent negatives include  no bloating, chills, coughing, fever, headaches, sweats, URI or vomiting. The symptoms are aggravated by dairy products. Risk factors include suspect food intake. He has tried analgesics, anti-motility drug, increased fluids and change of diet for the symptoms. The treatment provided no relief.       Past Medical History:   Diagnosis Date    Acute appendicitis 2018    Gallbladder attack 11/15/2018    Pain in right shoulder 2019    Postprocedural intraabdominal abscess 2018        Past Surgical History:   Procedure Laterality Date    APPENDECTOMY      EXTRACORPOREAL SHOCK WAVE LITHOTRIPSY Right 10/23/2020    Procedure: LITHOTRIPSY, ESWL;  Surgeon: Kirill Graves MD;  Location: [facility] OR [address] [place] [address];  Service: Urology;  Laterality: Right;  1.5 hours     INTRAVENOUS PYELOGRAM N/A 10/23/2020    Procedure: PYELOGRAM, INTRAVENOUS;  Surgeon: Kirill Graves MD;  Location: [facility] OR [address] [place] [address];  Service: Urology;  Laterality: N/A;    LAPAROSCOPIC CHOLECYSTECTOMY N/A 11/15/2018    Procedure: CHOLECYSTECTOMY, LAPAROSCOPIC;  Surgeon: Korey Keller Jr., MD;  Location: [facility];  Service: General;  Laterality: N/A;    LASIK Bilateral     UMBILICAL HERNIA REPAIR  10/16/13    open repair without mesh for 1cm defect; Dr. Rosales, [facility]        Family History   Problem Relation Age of Onset    Arthritis Mother     Cancer Father     Diabetes Father     Cataracts Father     Allergies Son         food allergies    Amblyopia Neg Hx     Blindness Neg Hx     Glaucoma Neg Hx     Macular degeneration Neg Hx     Retinal detachment Neg Hx     Strabismus Neg Hx        Social History     Tobacco Use   Smoking Status Former Smoker    Quit date: 2002    Years since quittin.8   Smokeless Tobacco Never Used       Social History     Social History Narrative    Not on file       Review of patient's allergies indicates:  No Known Allergies     Review of Systems   Constitutional: Negative for chills and fever.    HENT: Negative.    Eyes: Negative.    Respiratory: Negative for cough.    Gastrointestinal: Positive for abdominal pain, diarrhea and flatus. Negative for bloating and vomiting.   Genitourinary: Negative.    Musculoskeletal: Negative.    Neurological: Negative for headaches.   Psychiatric/Behavioral: Negative.          Objective:        There were no vitals filed for this visit.     Physical Exam  Constitutional:       Appearance: Normal appearance.   Pulmonary:      Effort: Pulmonary effort is normal.   Neurological:      Mental Status: He is alert and oriented to person, place, and time.   Psychiatric:         Behavior: Behavior normal.         Assessment:       1. Diarrhea, unspecified type    2. Nausea and vomiting in adult        Plan:       Diarrhea, unspecified type   Rule out Enteritis, Colitis, H.Pylori, Clostridium Difficile.  -     dicyclomine (BENTYL) 10 MG capsule; Take 1 capsule (10 mg total) by mouth 4 (four) times daily before meals and nightly.  Dispense: 20 capsule; Refill: 0  -     CBC Auto Differential; Future; Expected date: 06/22/2022  -     Comprehensive Metabolic Panel; Future; Expected date: 06/22/2022  -     H. PYLORI ANTIBODY, IGG; Future; Expected date: 06/22/2022  -     Stool Exam-Ova,Cysts,Parasites; Future; Expected date: 06/22/2022  -     US Abdomen Complete; Future; Expected date: 06/22/2022  -     Clostridium difficile EIA; Future; Expected date: 06/22/2022    Nausea and vomiting in adult  -     ondansetron (ZOFRAN-ODT) 4 MG TbDL; Take 1 tablet (4 mg total) by mouth every 6 (six) hours as needed (NAUSEA/VOMITTING).  Dispense: 20 tablet; Refill: 0       Medication List with Changes/Refills   New Medications    DICYCLOMINE (BENTYL) 10 MG CAPSULE    Take 1 capsule (10 mg total) by mouth 4 (four) times daily before meals and nightly.    ONDANSETRON (ZOFRAN-ODT) 4 MG TBDL    Take 1 tablet (4 mg total) by mouth every 6 (six) hours as needed (NAUSEA/VOMITTING).   Current Medications     FISH OIL-OMEGA-3 FATTY ACIDS 300-1,000 MG CAPSULE    Take by mouth once daily.    FLUTICASONE (FLONASE) 50 MCG/ACTUATION NASAL SPRAY    1 spray by Each Nostril route once daily. Each nostril daily    HYDROCODONE-ACETAMINOPHEN (NORCO) 5-325 MG PER TABLET    Take 1 tablet by mouth every 6 (six) hours as needed for Pain.    MELOXICAM (MOBIC) 15 MG TABLET    Take 1 tablet (15 mg total) by mouth once daily.    MULTIVITAMIN (THERAGRAN) PER TABLET    Take 1 tablet by mouth once daily.    TAMSULOSIN (FLOMAX) 0.4 MG CAP    Take 1 capsule (0.4 mg total) by mouth every evening.    UNABLE TO FIND    replenex-joint supplement   Discontinued Medications    ONDANSETRON (ZOFRAN-ODT) 4 MG TBDL    Take 1 tablet (4 mg total) by mouth every 6 (six) hours as needed (nausea).            Follow up if symptoms worsen or fail to improve.    Cait Adkins APRN, MSN, FNP-C

## 2022-06-23 ENCOUNTER — LAB VISIT (OUTPATIENT)
Dept: LAB | Facility: HOSPITAL | Age: 47
End: 2022-06-23
Attending: NURSE PRACTITIONER
Payer: COMMERCIAL

## 2022-06-23 DIAGNOSIS — R19.7 DIARRHEA, UNSPECIFIED TYPE: ICD-10-CM

## 2022-06-23 DIAGNOSIS — R19.7 DIARRHEA, UNSPECIFIED TYPE: Primary | ICD-10-CM

## 2022-06-23 LAB
ALBUMIN SERPL BCP-MCNC: 4.5 G/DL (ref 3.5–5.2)
ALP SERPL-CCNC: 106 U/L (ref 55–135)
ALT SERPL W/O P-5'-P-CCNC: 44 U/L (ref 10–44)
ANION GAP SERPL CALC-SCNC: 12 MMOL/L (ref 8–16)
AST SERPL-CCNC: 22 U/L (ref 10–40)
BASOPHILS # BLD AUTO: 0.05 K/UL (ref 0–0.2)
BASOPHILS NFR BLD: 0.5 % (ref 0–1.9)
BILIRUB SERPL-MCNC: 1.3 MG/DL (ref 0.1–1)
BUN SERPL-MCNC: 20 MG/DL (ref 6–20)
C DIFF GDH STL QL: NEGATIVE
C DIFF TOX A+B STL QL IA: NEGATIVE
CALCIUM SERPL-MCNC: 10 MG/DL (ref 8.7–10.5)
CHLORIDE SERPL-SCNC: 105 MMOL/L (ref 95–110)
CO2 SERPL-SCNC: 21 MMOL/L (ref 23–29)
CREAT SERPL-MCNC: 1.6 MG/DL (ref 0.5–1.4)
DIFFERENTIAL METHOD: ABNORMAL
EOSINOPHIL # BLD AUTO: 0.3 K/UL (ref 0–0.5)
EOSINOPHIL NFR BLD: 2.7 % (ref 0–8)
ERYTHROCYTE [DISTWIDTH] IN BLOOD BY AUTOMATED COUNT: 14.2 % (ref 11.5–14.5)
EST. GFR  (AFRICAN AMERICAN): 58.9 ML/MIN/1.73 M^2
EST. GFR  (NON AFRICAN AMERICAN): 50.9 ML/MIN/1.73 M^2
GLUCOSE SERPL-MCNC: 126 MG/DL (ref 70–110)
HCT VFR BLD AUTO: 53.6 % (ref 40–54)
HGB BLD-MCNC: 18.1 G/DL (ref 14–18)
IMM GRANULOCYTES # BLD AUTO: 0.01 K/UL (ref 0–0.04)
IMM GRANULOCYTES NFR BLD AUTO: 0.1 % (ref 0–0.5)
LYMPHOCYTES # BLD AUTO: 2.1 K/UL (ref 1–4.8)
LYMPHOCYTES NFR BLD: 22.1 % (ref 18–48)
MCH RBC QN AUTO: 28 PG (ref 27–31)
MCHC RBC AUTO-ENTMCNC: 33.8 G/DL (ref 32–36)
MCV RBC AUTO: 83 FL (ref 82–98)
MONOCYTES # BLD AUTO: 0.7 K/UL (ref 0.3–1)
MONOCYTES NFR BLD: 7 % (ref 4–15)
NEUTROPHILS # BLD AUTO: 6.4 K/UL (ref 1.8–7.7)
NEUTROPHILS NFR BLD: 67.6 % (ref 38–73)
NRBC BLD-RTO: 0 /100 WBC
PLATELET # BLD AUTO: 257 K/UL (ref 150–450)
PMV BLD AUTO: 11.1 FL (ref 9.2–12.9)
POTASSIUM SERPL-SCNC: 4.6 MMOL/L (ref 3.5–5.1)
PROT SERPL-MCNC: 8.1 G/DL (ref 6–8.4)
RBC # BLD AUTO: 6.47 M/UL (ref 4.6–6.2)
SODIUM SERPL-SCNC: 138 MMOL/L (ref 136–145)
WBC # BLD AUTO: 9.4 K/UL (ref 3.9–12.7)

## 2022-06-23 PROCEDURE — 85025 COMPLETE CBC W/AUTO DIFF WBC: CPT | Performed by: NURSE PRACTITIONER

## 2022-06-23 PROCEDURE — 80053 COMPREHEN METABOLIC PANEL: CPT | Performed by: NURSE PRACTITIONER

## 2022-06-23 PROCEDURE — 87177 OVA AND PARASITES SMEARS: CPT | Performed by: NURSE PRACTITIONER

## 2022-06-23 PROCEDURE — 87449 NOS EACH ORGANISM AG IA: CPT | Performed by: NURSE PRACTITIONER

## 2022-06-23 PROCEDURE — 36415 COLL VENOUS BLD VENIPUNCTURE: CPT | Mod: PO | Performed by: NURSE PRACTITIONER

## 2022-06-23 PROCEDURE — 87209 SMEAR COMPLEX STAIN: CPT | Performed by: NURSE PRACTITIONER

## 2022-06-23 PROCEDURE — 86677 HELICOBACTER PYLORI ANTIBODY: CPT | Performed by: NURSE PRACTITIONER

## 2022-06-24 ENCOUNTER — HOSPITAL ENCOUNTER (OUTPATIENT)
Dept: RADIOLOGY | Facility: HOSPITAL | Age: 47
Discharge: HOME | End: 2022-06-24
Attending: NURSE PRACTITIONER
Payer: COMMERCIAL

## 2022-06-24 ENCOUNTER — PATIENT MESSAGE (OUTPATIENT)
Dept: PRIMARY CARE CLINIC | Facility: CLINIC | Age: 47
End: 2022-06-24
Payer: COMMERCIAL

## 2022-06-24 DIAGNOSIS — R19.7 DIARRHEA, UNSPECIFIED TYPE: ICD-10-CM

## 2022-06-24 LAB — H PYLORI IGG SERPL QL IA: NEGATIVE

## 2022-06-24 PROCEDURE — 76700 US ABDOMEN COMPLETE: ICD-10-PCS | Mod: 26,,, | Performed by: RADIOLOGY

## 2022-06-24 PROCEDURE — 76700 US EXAM ABDOM COMPLETE: CPT | Mod: 26,,, | Performed by: RADIOLOGY

## 2022-06-24 PROCEDURE — 76700 US EXAM ABDOM COMPLETE: CPT | Mod: TC

## 2022-06-28 LAB — O+P STL MICRO: NORMAL

## 2022-08-02 ENCOUNTER — PATIENT MESSAGE (OUTPATIENT)
Dept: OPTOMETRY | Facility: CLINIC | Age: 47
End: 2022-08-02
Payer: COMMERCIAL

## 2022-09-07 ENCOUNTER — OFFICE VISIT (OUTPATIENT)
Dept: FAMILY MEDICINE | Facility: CLINIC | Age: 47
End: 2022-09-07
Attending: FAMILY MEDICINE
Payer: COMMERCIAL

## 2022-09-07 VITALS
DIASTOLIC BLOOD PRESSURE: 72 MMHG | SYSTOLIC BLOOD PRESSURE: 131 MMHG | OXYGEN SATURATION: 95 % | WEIGHT: 200.81 LBS | HEIGHT: 68 IN | HEART RATE: 89 BPM | BODY MASS INDEX: 30.44 KG/M2

## 2022-09-07 DIAGNOSIS — Z00.00 ANNUAL PHYSICAL EXAM: Primary | ICD-10-CM

## 2022-09-07 DIAGNOSIS — Z12.11 SCREEN FOR COLON CANCER: ICD-10-CM

## 2022-09-07 DIAGNOSIS — Z12.5 SCREENING FOR PROSTATE CANCER: ICD-10-CM

## 2022-09-07 PROBLEM — R11.2 NAUSEA AND VOMITING IN ADULT: Status: RESOLVED | Noted: 2022-06-22 | Resolved: 2022-09-07

## 2022-09-07 LAB
BILIRUB UR QL STRIP: NEGATIVE
CLARITY UR REFRACT.AUTO: CLEAR
COLOR UR AUTO: YELLOW
GLUCOSE UR QL STRIP: NEGATIVE
HGB UR QL STRIP: NEGATIVE
KETONES UR QL STRIP: NEGATIVE
LEUKOCYTE ESTERASE UR QL STRIP: NEGATIVE
NITRITE UR QL STRIP: NEGATIVE
PH UR STRIP: 6 [PH] (ref 5–8)
PROT UR QL STRIP: NEGATIVE
SP GR UR STRIP: 1.01 (ref 1–1.03)
URN SPEC COLLECT METH UR: NORMAL

## 2022-09-07 PROCEDURE — 99396 PREV VISIT EST AGE 40-64: CPT | Mod: S$GLB,,, | Performed by: FAMILY MEDICINE

## 2022-09-07 PROCEDURE — 3075F PR MOST RECENT SYSTOLIC BLOOD PRESS GE 130-139MM HG: ICD-10-PCS | Mod: CPTII,S$GLB,, | Performed by: FAMILY MEDICINE

## 2022-09-07 PROCEDURE — 1159F MED LIST DOCD IN RCRD: CPT | Mod: CPTII,S$GLB,, | Performed by: FAMILY MEDICINE

## 2022-09-07 PROCEDURE — 99999 PR PBB SHADOW E&M-EST. PATIENT-LVL IV: CPT | Mod: PBBFAC,,, | Performed by: FAMILY MEDICINE

## 2022-09-07 PROCEDURE — 81003 URINALYSIS AUTO W/O SCOPE: CPT | Performed by: FAMILY MEDICINE

## 2022-09-07 PROCEDURE — 3008F BODY MASS INDEX DOCD: CPT | Mod: CPTII,S$GLB,, | Performed by: FAMILY MEDICINE

## 2022-09-07 PROCEDURE — 99396 PR PREVENTIVE VISIT,EST,40-64: ICD-10-PCS | Mod: S$GLB,,, | Performed by: FAMILY MEDICINE

## 2022-09-07 PROCEDURE — 3075F SYST BP GE 130 - 139MM HG: CPT | Mod: CPTII,S$GLB,, | Performed by: FAMILY MEDICINE

## 2022-09-07 PROCEDURE — 1159F PR MEDICATION LIST DOCUMENTED IN MEDICAL RECORD: ICD-10-PCS | Mod: CPTII,S$GLB,, | Performed by: FAMILY MEDICINE

## 2022-09-07 PROCEDURE — 3078F DIAST BP <80 MM HG: CPT | Mod: CPTII,S$GLB,, | Performed by: FAMILY MEDICINE

## 2022-09-07 PROCEDURE — 3008F PR BODY MASS INDEX (BMI) DOCUMENTED: ICD-10-PCS | Mod: CPTII,S$GLB,, | Performed by: FAMILY MEDICINE

## 2022-09-07 PROCEDURE — 99999 PR PBB SHADOW E&M-EST. PATIENT-LVL IV: ICD-10-PCS | Mod: PBBFAC,,, | Performed by: FAMILY MEDICINE

## 2022-09-07 PROCEDURE — 3078F PR MOST RECENT DIASTOLIC BLOOD PRESSURE < 80 MM HG: ICD-10-PCS | Mod: CPTII,S$GLB,, | Performed by: FAMILY MEDICINE

## 2022-09-07 NOTE — PROGRESS NOTES
"Subjective:       Patient ID: Gautam Ross is a 47 y.o. male.    Chief Complaint: Annual Exam    HPI  Pt is here for annual exam pt is well no sob /cp no change in bowel habits no brbpr  Pt denies n/v/f/c/d/c no cough chest congestion no sore throat  Pt denies dysuria hematuria no acute urinary complaints    Review of Systems   Constitutional:  Negative for activity change, chills, fatigue and fever.   HENT:  Negative for congestion, ear pain, hearing loss, postnasal drip, rhinorrhea, sinus pressure and sore throat.    Eyes:  Negative for photophobia, pain, redness and visual disturbance.   Respiratory:  Negative for cough, chest tightness and shortness of breath.    Cardiovascular:  Negative for chest pain, palpitations and leg swelling.   Gastrointestinal:  Negative for abdominal pain, blood in stool, constipation, diarrhea, nausea and vomiting.   Genitourinary:  Negative for decreased urine volume, difficulty urinating, dysuria, frequency, penile discharge and urgency.   Musculoskeletal:  Negative for back pain, joint swelling and neck pain.   Skin:  Negative for color change, pallor and rash.   Neurological:  Negative for dizziness, seizures, speech difficulty and numbness.   Hematological:  Does not bruise/bleed easily.   Psychiatric/Behavioral:  Negative for behavioral problems, confusion, decreased concentration and suicidal ideas.      Objective:    /72   Pulse 89   Ht 5' 8" (1.727 m)   Wt 91.1 kg (200 lb 12.8 oz)   SpO2 95%   BMI 30.53 kg/m²     Physical Exam  Constitutional:       General: He is not in acute distress.     Appearance: Normal appearance. He is well-developed. He is not ill-appearing.   HENT:      Head: Normocephalic and atraumatic.      Right Ear: Tympanic membrane normal.      Left Ear: Tympanic membrane normal.      Nose: Nose normal.   Eyes:      Extraocular Movements: Extraocular movements intact.      Pupils: Pupils are equal, round, and reactive to light.   Neck: " "     Thyroid: No thyromegaly.   Cardiovascular:      Rate and Rhythm: Normal rate and regular rhythm.      Heart sounds: Normal heart sounds. No murmur heard.    No friction rub. No gallop.   Pulmonary:      Effort: Pulmonary effort is normal. No respiratory distress.      Breath sounds: Normal breath sounds.   Abdominal:      General: Bowel sounds are normal. There is no distension.      Palpations: Abdomen is soft.      Tenderness: There is no abdominal tenderness. There is no guarding or rebound.   Genitourinary:     Comments: declined  Musculoskeletal:         General: No tenderness. Normal range of motion.      Cervical back: Normal range of motion and neck supple.   Skin:     General: Skin is warm and dry.      Findings: No erythema.   Neurological:      General: No focal deficit present.      Mental Status: He is alert and oriented to person, place, and time.      Cranial Nerves: No cranial nerve deficit.      Coordination: Coordination normal.   Psychiatric:         Mood and Affect: Mood normal.         Behavior: Behavior normal.         Thought Content: Thought content normal.         Judgment: Judgment normal.       Assessment:       1. Annual physical exam    2. Screening for prostate cancer    3. Screen for colon cancer          Plan:     Orders cmp cbc lipid tsh urine psa  Cont meds  Low fat diet  Graded exercise  Rtc annually    Health maintenance  Lipid ordered  Flu in fall  Tetanus q 10 years  Psa discussed       "This note will not be shared with the patient."       "

## 2022-09-08 ENCOUNTER — LAB VISIT (OUTPATIENT)
Dept: LAB | Facility: HOSPITAL | Age: 47
End: 2022-09-08
Attending: FAMILY MEDICINE
Payer: COMMERCIAL

## 2022-09-08 ENCOUNTER — PATIENT MESSAGE (OUTPATIENT)
Dept: ADMINISTRATIVE | Facility: HOSPITAL | Age: 47
End: 2022-09-08
Payer: COMMERCIAL

## 2022-09-08 DIAGNOSIS — Z12.11 SCREENING FOR COLON CANCER: ICD-10-CM

## 2022-09-08 DIAGNOSIS — Z00.00 ANNUAL PHYSICAL EXAM: ICD-10-CM

## 2022-09-08 DIAGNOSIS — Z12.5 SCREENING FOR PROSTATE CANCER: ICD-10-CM

## 2022-09-08 LAB
ALBUMIN SERPL BCP-MCNC: 4 G/DL (ref 3.5–5.2)
ALP SERPL-CCNC: 79 U/L (ref 55–135)
ALT SERPL W/O P-5'-P-CCNC: 40 U/L (ref 10–44)
ANION GAP SERPL CALC-SCNC: 9 MMOL/L (ref 8–16)
AST SERPL-CCNC: 22 U/L (ref 10–40)
BASOPHILS # BLD AUTO: 0.04 K/UL (ref 0–0.2)
BASOPHILS NFR BLD: 0.8 % (ref 0–1.9)
BILIRUB SERPL-MCNC: 0.7 MG/DL (ref 0.1–1)
BUN SERPL-MCNC: 14 MG/DL (ref 6–20)
CALCIUM SERPL-MCNC: 8.9 MG/DL (ref 8.7–10.5)
CHLORIDE SERPL-SCNC: 107 MMOL/L (ref 95–110)
CHOLEST SERPL-MCNC: 197 MG/DL (ref 120–199)
CHOLEST/HDLC SERPL: 5.3 {RATIO} (ref 2–5)
CO2 SERPL-SCNC: 24 MMOL/L (ref 23–29)
COMPLEXED PSA SERPL-MCNC: 0.89 NG/ML (ref 0–4)
CREAT SERPL-MCNC: 1.1 MG/DL (ref 0.5–1.4)
DIFFERENTIAL METHOD: NORMAL
EOSINOPHIL # BLD AUTO: 0.2 K/UL (ref 0–0.5)
EOSINOPHIL NFR BLD: 3.7 % (ref 0–8)
ERYTHROCYTE [DISTWIDTH] IN BLOOD BY AUTOMATED COUNT: 13.3 % (ref 11.5–14.5)
EST. GFR  (NO RACE VARIABLE): >60 ML/MIN/1.73 M^2
GLUCOSE SERPL-MCNC: 96 MG/DL (ref 70–110)
HCT VFR BLD AUTO: 45 % (ref 40–54)
HCV AB SERPL QL IA: NORMAL
HDLC SERPL-MCNC: 37 MG/DL (ref 40–75)
HDLC SERPL: 18.8 % (ref 20–50)
HGB BLD-MCNC: 15.2 G/DL (ref 14–18)
HIV 1+2 AB+HIV1 P24 AG SERPL QL IA: NORMAL
IMM GRANULOCYTES # BLD AUTO: 0.01 K/UL (ref 0–0.04)
IMM GRANULOCYTES NFR BLD AUTO: 0.2 % (ref 0–0.5)
LDLC SERPL CALC-MCNC: 123.6 MG/DL (ref 63–159)
LYMPHOCYTES # BLD AUTO: 2.3 K/UL (ref 1–4.8)
LYMPHOCYTES NFR BLD: 43.7 % (ref 18–48)
MCH RBC QN AUTO: 27.9 PG (ref 27–31)
MCHC RBC AUTO-ENTMCNC: 33.8 G/DL (ref 32–36)
MCV RBC AUTO: 83 FL (ref 82–98)
MONOCYTES # BLD AUTO: 0.5 K/UL (ref 0.3–1)
MONOCYTES NFR BLD: 9.4 % (ref 4–15)
NEUTROPHILS # BLD AUTO: 2.2 K/UL (ref 1.8–7.7)
NEUTROPHILS NFR BLD: 42.2 % (ref 38–73)
NONHDLC SERPL-MCNC: 160 MG/DL
NRBC BLD-RTO: 0 /100 WBC
PLATELET # BLD AUTO: 205 K/UL (ref 150–450)
PMV BLD AUTO: 11.2 FL (ref 9.2–12.9)
POTASSIUM SERPL-SCNC: 4.3 MMOL/L (ref 3.5–5.1)
PROT SERPL-MCNC: 7.2 G/DL (ref 6–8.4)
RBC # BLD AUTO: 5.45 M/UL (ref 4.6–6.2)
SODIUM SERPL-SCNC: 140 MMOL/L (ref 136–145)
TRIGL SERPL-MCNC: 182 MG/DL (ref 30–150)
TSH SERPL DL<=0.005 MIU/L-ACNC: 2.42 UIU/ML (ref 0.4–4)
WBC # BLD AUTO: 5.2 K/UL (ref 3.9–12.7)

## 2022-09-08 PROCEDURE — 87389 HIV-1 AG W/HIV-1&-2 AB AG IA: CPT | Performed by: FAMILY MEDICINE

## 2022-09-08 PROCEDURE — 86803 HEPATITIS C AB TEST: CPT | Performed by: FAMILY MEDICINE

## 2022-09-08 PROCEDURE — 85025 COMPLETE CBC W/AUTO DIFF WBC: CPT | Performed by: FAMILY MEDICINE

## 2022-09-08 PROCEDURE — 36415 COLL VENOUS BLD VENIPUNCTURE: CPT | Mod: PO | Performed by: FAMILY MEDICINE

## 2022-09-08 PROCEDURE — 84443 ASSAY THYROID STIM HORMONE: CPT | Performed by: FAMILY MEDICINE

## 2022-09-08 PROCEDURE — 80061 LIPID PANEL: CPT | Performed by: FAMILY MEDICINE

## 2022-09-08 PROCEDURE — 84153 ASSAY OF PSA TOTAL: CPT | Performed by: FAMILY MEDICINE

## 2022-09-08 PROCEDURE — 80053 COMPREHEN METABOLIC PANEL: CPT | Performed by: FAMILY MEDICINE

## 2022-09-14 DIAGNOSIS — Z12.11 COLON CANCER SCREENING: ICD-10-CM

## 2022-09-25 LAB — HEMOCCULT STL QL IA: NEGATIVE

## 2022-10-05 ENCOUNTER — HOSPITAL ENCOUNTER (EMERGENCY)
Facility: HOSPITAL | Age: 47
Discharge: HOME | End: 2022-10-05
Attending: EMERGENCY MEDICINE
Payer: COMMERCIAL

## 2022-10-05 VITALS
OXYGEN SATURATION: 99 % | HEART RATE: 69 BPM | TEMPERATURE: 98 F | SYSTOLIC BLOOD PRESSURE: 136 MMHG | BODY MASS INDEX: 28.79 KG/M2 | HEIGHT: 68 IN | DIASTOLIC BLOOD PRESSURE: 88 MMHG | WEIGHT: 190 LBS | RESPIRATION RATE: 18 BRPM

## 2022-10-05 DIAGNOSIS — N20.0 NEPHROLITHIASIS: ICD-10-CM

## 2022-10-05 DIAGNOSIS — R10.9 FLANK PAIN: Primary | ICD-10-CM

## 2022-10-05 LAB
ALBUMIN SERPL BCP-MCNC: 4.3 G/DL (ref 3.5–5.2)
ALP SERPL-CCNC: 90 U/L (ref 55–135)
ALT SERPL W/O P-5'-P-CCNC: 43 U/L (ref 10–44)
ANION GAP SERPL CALC-SCNC: 11 MMOL/L (ref 8–16)
AST SERPL-CCNC: 32 U/L (ref 10–40)
BACTERIA #/AREA URNS AUTO: ABNORMAL /HPF
BASOPHILS # BLD AUTO: 0.04 K/UL (ref 0–0.2)
BASOPHILS NFR BLD: 0.4 % (ref 0–1.9)
BILIRUB SERPL-MCNC: 0.6 MG/DL (ref 0.1–1)
BILIRUB UR QL STRIP: NEGATIVE
BUN SERPL-MCNC: 17 MG/DL (ref 6–20)
CALCIUM SERPL-MCNC: 9.5 MG/DL (ref 8.7–10.5)
CHLORIDE SERPL-SCNC: 105 MMOL/L (ref 95–110)
CLARITY UR REFRACT.AUTO: CLEAR
CO2 SERPL-SCNC: 23 MMOL/L (ref 23–29)
COLOR UR AUTO: YELLOW
CREAT SERPL-MCNC: 1.1 MG/DL (ref 0.5–1.4)
DIFFERENTIAL METHOD: ABNORMAL
EOSINOPHIL # BLD AUTO: 0.2 K/UL (ref 0–0.5)
EOSINOPHIL NFR BLD: 1.7 % (ref 0–8)
ERYTHROCYTE [DISTWIDTH] IN BLOOD BY AUTOMATED COUNT: 13.2 % (ref 11.5–14.5)
EST. GFR  (NO RACE VARIABLE): >60 ML/MIN/1.73 M^2
GLUCOSE SERPL-MCNC: 94 MG/DL (ref 70–110)
GLUCOSE UR QL STRIP: NEGATIVE
HCT VFR BLD AUTO: 46.2 % (ref 40–54)
HGB BLD-MCNC: 15.3 G/DL (ref 14–18)
HGB UR QL STRIP: ABNORMAL
IMM GRANULOCYTES # BLD AUTO: 0.03 K/UL (ref 0–0.04)
IMM GRANULOCYTES NFR BLD AUTO: 0.3 % (ref 0–0.5)
KETONES UR QL STRIP: NEGATIVE
LEUKOCYTE ESTERASE UR QL STRIP: NEGATIVE
LYMPHOCYTES # BLD AUTO: 1.6 K/UL (ref 1–4.8)
LYMPHOCYTES NFR BLD: 17.5 % (ref 18–48)
MCH RBC QN AUTO: 27.5 PG (ref 27–31)
MCHC RBC AUTO-ENTMCNC: 33.1 G/DL (ref 32–36)
MCV RBC AUTO: 83 FL (ref 82–98)
MICROSCOPIC COMMENT: ABNORMAL
MONOCYTES # BLD AUTO: 0.7 K/UL (ref 0.3–1)
MONOCYTES NFR BLD: 8 % (ref 4–15)
NEUTROPHILS # BLD AUTO: 6.4 K/UL (ref 1.8–7.7)
NEUTROPHILS NFR BLD: 72.1 % (ref 38–73)
NITRITE UR QL STRIP: NEGATIVE
NRBC BLD-RTO: 0 /100 WBC
PH UR STRIP: 6 [PH] (ref 5–8)
PLATELET # BLD AUTO: 218 K/UL (ref 150–450)
PMV BLD AUTO: 10.9 FL (ref 9.2–12.9)
POTASSIUM SERPL-SCNC: 4.6 MMOL/L (ref 3.5–5.1)
PROT SERPL-MCNC: 8.3 G/DL (ref 6–8.4)
PROT UR QL STRIP: NEGATIVE
RBC # BLD AUTO: 5.56 M/UL (ref 4.6–6.2)
RBC #/AREA URNS AUTO: 43 /HPF (ref 0–4)
SODIUM SERPL-SCNC: 139 MMOL/L (ref 136–145)
SP GR UR STRIP: 1.02 (ref 1–1.03)
URN SPEC COLLECT METH UR: ABNORMAL
WBC # BLD AUTO: 8.95 K/UL (ref 3.9–12.7)
WBC #/AREA URNS AUTO: 2 /HPF (ref 0–5)

## 2022-10-05 PROCEDURE — 99284 PR EMERGENCY DEPT VISIT,LEVEL IV: ICD-10-PCS | Mod: ,,, | Performed by: EMERGENCY MEDICINE

## 2022-10-05 PROCEDURE — 96360 HYDRATION IV INFUSION INIT: CPT

## 2022-10-05 PROCEDURE — 99284 EMERGENCY DEPT VISIT MOD MDM: CPT | Mod: 25

## 2022-10-05 PROCEDURE — 85025 COMPLETE CBC W/AUTO DIFF WBC: CPT | Performed by: EMERGENCY MEDICINE

## 2022-10-05 PROCEDURE — 25000003 PHARM REV CODE 250: Performed by: EMERGENCY MEDICINE

## 2022-10-05 PROCEDURE — 80053 COMPREHEN METABOLIC PANEL: CPT | Performed by: EMERGENCY MEDICINE

## 2022-10-05 PROCEDURE — 81001 URINALYSIS AUTO W/SCOPE: CPT | Performed by: EMERGENCY MEDICINE

## 2022-10-05 PROCEDURE — 99284 EMERGENCY DEPT VISIT MOD MDM: CPT | Mod: ,,, | Performed by: EMERGENCY MEDICINE

## 2022-10-05 RX ORDER — ONDANSETRON 4 MG/1
4 TABLET, FILM COATED ORAL EVERY 6 HOURS
Qty: 12 TABLET | Refills: 0 | Status: SHIPPED | OUTPATIENT
Start: 2022-10-05 | End: 2023-07-31 | Stop reason: CLARIF

## 2022-10-05 RX ORDER — TAMSULOSIN HYDROCHLORIDE 0.4 MG/1
0.4 CAPSULE ORAL DAILY
Qty: 7 CAPSULE | Refills: 0 | Status: SHIPPED | OUTPATIENT
Start: 2022-10-05 | End: 2023-07-31 | Stop reason: CLARIF

## 2022-10-05 RX ORDER — IBUPROFEN 400 MG/1
400 TABLET ORAL EVERY 6 HOURS PRN
Qty: 20 TABLET | Refills: 0 | Status: SHIPPED | OUTPATIENT
Start: 2022-10-05 | End: 2023-07-31 | Stop reason: CLARIF

## 2022-10-05 RX ADMIN — SODIUM CHLORIDE 1000 ML: 0.9 INJECTION, SOLUTION INTRAVENOUS at 07:10

## 2022-10-05 NOTE — ED PROVIDER NOTES
Encounter Date: 10/5/2022       History     Chief Complaint   Patient presents with    Flank Pain     C/o left flank pain x30 minutes, hx of kidney stones     47-year-old male presenting with left flank pain.    PMH:  Status post appendectomy, status post cholecystectomy, kidney stone status post lithotripsy    Context:  The patient reports earlier today he had left flank pain that felt like previous kidney stone pain.  At the time he was very nauseated, did not vomit.  The pain and nausea have improved since his arrival.  Onset:  Gradual  Location:  Left flank  Duration:  Episode lasted approximately 30 minutes  Associated Symptoms:  Denies fevers or chills, radiation of pain to testicles, hematuria       The history is provided by the patient and medical records. No  was used.   Review of patient's allergies indicates:  No Known Allergies  Past Medical History:   Diagnosis Date    Acute appendicitis 5/13/2018    Gallbladder attack 11/15/2018    Pain in right shoulder 9/16/2019    Postprocedural intraabdominal abscess 5/24/2018     Past Surgical History:   Procedure Laterality Date    APPENDECTOMY      EXTRACORPOREAL SHOCK WAVE LITHOTRIPSY Right 10/23/2020    Procedure: LITHOTRIPSY, ESWL;  Surgeon: Kirill Graves MD;  Location: [address];  Service: Urology;  Laterality: Right;  1.5 hours     INTRAVENOUS PYELOGRAM N/A 10/23/2020    Procedure: PYELOGRAM, INTRAVENOUS;  Surgeon: Kirill Graves MD;  Location: [address];  Service: Urology;  Laterality: N/A;    LAPAROSCOPIC CHOLECYSTECTOMY N/A 11/15/2018    Procedure: CHOLECYSTECTOMY, LAPAROSCOPIC;  Surgeon: Korey Keller Jr., MD;  Location: [facility];  Service: General;  Laterality: N/A;    LASIK Bilateral     UMBILICAL HERNIA REPAIR  10/16/13    open repair without mesh for 1cm defect; Dr. Rosales, [facility]     Family History   Problem Relation Age of Onset    Arthritis Mother     Cancer Father     Diabetes Father     Cataracts Father      Allergies Son         food allergies    Amblyopia Neg Hx     Blindness Neg Hx     Glaucoma Neg Hx     Macular degeneration Neg Hx     Retinal detachment Neg Hx     Strabismus Neg Hx      Social History     Tobacco Use    Smoking status: Former     Types: Cigarettes     Quit date: 2002     Years since quittin.1    Smokeless tobacco: Never   Substance Use Topics    Alcohol use: No    Drug use: No     Review of Systems   Constitutional:  Negative for fever.   HENT:  Negative for trouble swallowing.    Respiratory:  Negative for shortness of breath.    Cardiovascular:  Negative for chest pain.   Gastrointestinal:  Positive for nausea. Negative for abdominal pain.   Genitourinary:  Positive for flank pain. Negative for dysuria, hematuria and testicular pain.   Skin:  Negative for rash.   Allergic/Immunologic: Negative for immunocompromised state.   Hematological:  Does not bruise/bleed easily.     Physical Exam     Initial Vitals [10/05/22 1645]   BP Pulse Resp Temp SpO2   (!) 140/92 63 18 97.5 °F (36.4 °C) 98 %      MAP       --         Physical Exam    Nursing note and vitals reviewed.  Constitutional: He is not diaphoretic. No distress.   HENT:   Head: Normocephalic and atraumatic.   Eyes: Right eye exhibits no discharge. Left eye exhibits no discharge.   Neck: Neck supple. No tracheal deviation present.   Cardiovascular:  Normal rate and regular rhythm.           Pulmonary/Chest: Breath sounds normal. No respiratory distress.   Abdominal: Abdomen is soft. There is no abdominal tenderness.   No right CVA tenderness.  No left CVA tenderness.   Musculoskeletal:      Cervical back: Neck supple.      Comments: No lumbar spinal or paraspinal tenderness     Neurological: He is alert and oriented to person, place, and time.   Skin: Skin is warm. No rash noted.   Psychiatric: He has a normal mood and affect. His behavior is normal.       ED Course   Procedures  Labs Reviewed   URINALYSIS, REFLEX TO URINE CULTURE -  Abnormal; Notable for the following components:       Result Value    Occult Blood UA 2+ (*)     All other components within normal limits    Narrative:     Specimen Source->Urine   CBC W/ AUTO DIFFERENTIAL - Abnormal; Notable for the following components:    Lymph % 17.5 (*)     All other components within normal limits   URINALYSIS MICROSCOPIC - Abnormal; Notable for the following components:    RBC, UA 43 (*)     All other components within normal limits    Narrative:     Specimen Source->Urine   COMPREHENSIVE METABOLIC PANEL          Imaging Results              CT Renal Stone Study ABD Pelvis WO (Edited Result - FINAL)  Result time 10/05/22 20:00:38      Addendum (preliminary) 1 of 1 by Kolby Kingston MD (10/05/22 20:00:38)      Correction to impression #2: Remote cholecystectomy and APPENDECTOMY.      Electronically signed by: Kolby Kingston MD  Date:    10/05/2022  Time:    20:00                 Final result by Kolby Kingston MD (10/05/22 19:32:31)                   Impression:      1. Left renal lower pole new 3 mm nonobstructing calculus.  2. Remote cholecystectomy and hysterectomy.  3. Suspected hepatic steatosis.  4. Minimal colonic diverticulosis.  5. Few additional findings as above.      Electronically signed by: Kolby Kingston MD  Date:    10/05/2022  Time:    19:32               Narrative:    EXAMINATION:  CT RENAL STONE STUDY ABD PELVIS WO    CLINICAL HISTORY:  Flank pain, kidney stone suspected;    TECHNIQUE:  Low dose axial images, sagittal and coronal reformations were obtained from the lung bases to the pubic symphysis.  Contrast was not administered.    COMPARISON:  Abdominal ultrasound 06/24/2022, CT renal stone study 11/20/2020    FINDINGS:  Imaged lung bases show platelike scarring versus atelectasis on the left without consolidation or pleural effusion, similar to prior.  Base of the heart is within normal limits.    Remote cholecystectomy.  Liver is normal in size noting diffuse parenchymal  hypoattenuation consistent with fatty infiltration.  Spleen is mildly enlarged without focal process seen.  Pancreas and bilateral adrenal glands are within normal limits.  Stomach is mildly distended with intraluminal recently ingested contents without wall thickening or adjacent inflammation.  Duodenum is within normal limits.    Bilateral kidneys are normal in size, shape and location.  Bilateral extrarenal pelvises, left larger than right, stable.  No hydronephrosis.  Grossly similar bilateral minimal nonspecific perinephric stranding.  There is a new 3 mm calculus at the left renal lower pole.  No radiodense calculus seen within the right collecting system, left ureter or urinary bladder.  Ureters are nondilated.  Urinary bladder is within normal limits.  Prostate and seminal vesicles are within normal limits.  Pelvic phleboliths noted.    No ascites or free air.  Scattered prominent but nonenlarged mesenteric lymph nodes with stranding of the midline and left central mesentery similar to prior.  No significant calcific atherosclerosis.  Aorta tapers normally.    Remote appendectomy.  Terminal ileum is within normal limits.  No evidence of bowel obstruction or acute inflammation.  A few scattered colonic diverticula without acute diverticulitis.    Osseous structures appear stable without acute process seen.                                       Medications   sodium chloride 0.9% bolus 1,000 mL (0 mLs Intravenous Stopped 10/5/22 1955)     Medical Decision Making:   History:   Old Medical Records: I decided to obtain old medical records.  Initial Assessment:   47-year-old male presenting with left flank pain, improved on arrival.  Afebrile, no acute distress.  Differential Diagnosis:   Including, but not limited to:    UTI  Pyelonephritis  Nephrolithiasis  Musculoskeletal pain  Clinical Tests:   Lab Tests: Ordered and Reviewed  Radiological Study: Reviewed and Ordered  ED Management:  UA without evidence of  infection.  CT with 3 mm nonobstructing stone - explains presentation.  Patient's pain and nausea remain controlled in the emergency department.  He was provided with a strainer and a prescription for Flomax.  Advised outpatient follow-up with Urology.  Strict return precautions given, including fever, inability to control pain at home if it returns.  Patient understands and agrees with the plan.  All questions answered prior to discharge.           ED Course as of 10/05/22 2345   Wed Oct 05, 2022   1933 WBC: 8.95  No leukocytosis  [AB]      ED Course User Index  [AB] Darrian Henderson MD                 Clinical Impression:   Final diagnoses:  [R10.9] Flank pain (Primary)  [N20.0] Nephrolithiasis      ED Disposition Condition    Discharge Stable          ED Prescriptions       Medication Sig Dispense Start Date End Date Auth. Provider    tamsulosin (FLOMAX) 0.4 mg Cap Take 1 capsule (0.4 mg total) by mouth once daily. for 7 days 7 capsule 10/5/2022 10/12/2022 Darrian Henderson MD    ondansetron (ZOFRAN) 4 MG tablet Take 1 tablet (4 mg total) by mouth every 6 (six) hours. 12 tablet 10/5/2022 -- Darrian Henderson MD    ibuprofen (ADVIL,MOTRIN) 400 MG tablet Take 1 tablet (400 mg total) by mouth every 6 (six) hours as needed for Other (pain). 20 tablet 10/5/2022 -- Darrian Henderson MD          Follow-up Information       Follow up With Specialties Details Why Contact Info Additional Information    Nan Esquivel MD Family Medicine In 3 days  [address]  [address]  [facility] [address]  [phone]       [facility] - Emergency Dept Emergency Medicine  As needed, If symptoms worsen [address] [facility] [address] [facility] [id number] [phone]     [facility] - Urology [address] 4th Fl Urology In 1 week  [address] [id number] [phone] Main Building, 4th Floor Please park in [facility] and take At[address] elevator             Darrian Henderson MD  10/05/22 0659

## 2022-10-06 NOTE — ED TRIAGE NOTES
47-year-old male presenting with left flank pain Status post appendectomy, status post cholecystectomy, kidney stone status post lithotripsy. The patient reports earlier today he had left flank pain that felt like previous kidney stone pain.  At the time he was very nauseated, did not vomit.  The pain and nausea have improved since his arrival.  Onset:  Gradual  Location:  Left flank  Duration:  Episode lasted approximately 30 minutes  Associated Symptoms:  Denies fevers or chills, radiation of pain to testicles, hematuria

## 2022-10-07 ENCOUNTER — PATIENT MESSAGE (OUTPATIENT)
Dept: UROLOGY | Facility: CLINIC | Age: 47
End: 2022-10-07
Payer: COMMERCIAL

## 2022-10-10 ENCOUNTER — TELEPHONE (OUTPATIENT)
Dept: UROLOGY | Facility: CLINIC | Age: 47
End: 2022-10-10
Payer: COMMERCIAL

## 2022-10-10 ENCOUNTER — OFFICE VISIT (OUTPATIENT)
Dept: UROLOGY | Facility: CLINIC | Age: 47
End: 2022-10-10
Payer: COMMERCIAL

## 2022-10-10 VITALS
DIASTOLIC BLOOD PRESSURE: 69 MMHG | HEIGHT: 68 IN | HEART RATE: 66 BPM | BODY MASS INDEX: 30.58 KG/M2 | SYSTOLIC BLOOD PRESSURE: 111 MMHG | WEIGHT: 201.75 LBS

## 2022-10-10 DIAGNOSIS — N20.0 RIGHT KIDNEY STONE: Primary | ICD-10-CM

## 2022-10-10 DIAGNOSIS — N20.0 KIDNEY STONE: ICD-10-CM

## 2022-10-10 DIAGNOSIS — N13.5 UPJ (URETEROPELVIC JUNCTION) OBSTRUCTION: ICD-10-CM

## 2022-10-10 DIAGNOSIS — N20.0 KIDNEY STONE ON LEFT SIDE: Primary | ICD-10-CM

## 2022-10-10 PROCEDURE — 99999 PR PBB SHADOW E&M-EST. PATIENT-LVL III: ICD-10-PCS | Mod: PBBFAC,,, | Performed by: UROLOGY

## 2022-10-10 PROCEDURE — 3074F PR MOST RECENT SYSTOLIC BLOOD PRESSURE < 130 MM HG: ICD-10-PCS | Mod: CPTII,S$GLB,, | Performed by: UROLOGY

## 2022-10-10 PROCEDURE — 99999 PR PBB SHADOW E&M-EST. PATIENT-LVL III: CPT | Mod: PBBFAC,,, | Performed by: UROLOGY

## 2022-10-10 PROCEDURE — 1159F PR MEDICATION LIST DOCUMENTED IN MEDICAL RECORD: ICD-10-PCS | Mod: CPTII,S$GLB,, | Performed by: UROLOGY

## 2022-10-10 PROCEDURE — 1159F MED LIST DOCD IN RCRD: CPT | Mod: CPTII,S$GLB,, | Performed by: UROLOGY

## 2022-10-10 PROCEDURE — 3078F PR MOST RECENT DIASTOLIC BLOOD PRESSURE < 80 MM HG: ICD-10-PCS | Mod: CPTII,S$GLB,, | Performed by: UROLOGY

## 2022-10-10 PROCEDURE — 3008F BODY MASS INDEX DOCD: CPT | Mod: CPTII,S$GLB,, | Performed by: UROLOGY

## 2022-10-10 PROCEDURE — 3008F PR BODY MASS INDEX (BMI) DOCUMENTED: ICD-10-PCS | Mod: CPTII,S$GLB,, | Performed by: UROLOGY

## 2022-10-10 PROCEDURE — 99214 OFFICE O/P EST MOD 30 MIN: CPT | Mod: S$GLB,,, | Performed by: UROLOGY

## 2022-10-10 PROCEDURE — 3074F SYST BP LT 130 MM HG: CPT | Mod: CPTII,S$GLB,, | Performed by: UROLOGY

## 2022-10-10 PROCEDURE — 99214 PR OFFICE/OUTPT VISIT, EST, LEVL IV, 30-39 MIN: ICD-10-PCS | Mod: S$GLB,,, | Performed by: UROLOGY

## 2022-10-10 PROCEDURE — 3078F DIAST BP <80 MM HG: CPT | Mod: CPTII,S$GLB,, | Performed by: UROLOGY

## 2022-10-10 NOTE — PROGRESS NOTES
CC: recurrent stone disease    Gautam Ross is a 47 y.o. man who is here for the evaluation of recurrent kidney stone.    Went to the ER on 10/5/22  The patient reports earlier today he had left flank pain that felt like previous kidney stone pain.  At the time he was very nauseated, did not vomit.  The pain and nausea have improved since his arrival.  Onset:  Gradual  Location:  Left flank  Duration:  Episode lasted approximately 30 minutes  Associated Symptoms:  Denies fevers or chills, radiation of pain to testicles, hematuria  Since his visit to the ER, he did not have any symptoms.      He previoulsy underwent right ESWL.  Because the stone was rather difficult to visualize on his previous KUB, we decided to do a CT RSS.    Date: 10/23/2020  Pre-Op Diagnosis: Right renal stone  Post-Op Diagnosis: same  Procedure(s) Performed:   1.  Right ESWL  Findings:   Stone difficult to see on x-ray, so IVP was used to help locate the stone which was found in the lower pole of the right kidney      His family members had many episodes of recurrent kidney stones.  His father and older brother had at least more than 20 stones in the past.  His older brother started to take Calcium supplement and stopped having recurrent stones.    He is a .      Past Medical History:   Diagnosis Date    Acute appendicitis 5/13/2018    Gallbladder attack 11/15/2018    Pain in right shoulder 9/16/2019    Postprocedural intraabdominal abscess 5/24/2018     Past Surgical History:   Procedure Laterality Date    APPENDECTOMY      EXTRACORPOREAL SHOCK WAVE LITHOTRIPSY Right 10/23/2020    Procedure: LITHOTRIPSY, ESWL;  Surgeon: Kirill Graves MD;  Location: [facility] OR [address] [place] [address];  Service: Urology;  Laterality: Right;  1.5 hours     INTRAVENOUS PYELOGRAM N/A 10/23/2020    Procedure: PYELOGRAM, INTRAVENOUS;  Surgeon: Kirill Graves MD;  Location: [facility] OR [address] [place] [address];  Service: Urology;  Laterality: N/A;    LAPAROSCOPIC CHOLECYSTECTOMY N/A  11/15/2018    Procedure: CHOLECYSTECTOMY, LAPAROSCOPIC;  Surgeon: Korey Keller Jr., MD;  Location: [facility];  Service: General;  Laterality: N/A;    LASIK Bilateral     UMBILICAL HERNIA REPAIR  10/16/13    open repair without mesh for 1cm defect; Dr. Rosales, [facility] [place]     Social History     Tobacco Use    Smoking status: Former     Types: Cigarettes     Quit date: 2002     Years since quittin.1    Smokeless tobacco: Never   Substance Use Topics    Alcohol use: No    Drug use: No     Family History   Problem Relation Age of Onset    Arthritis Mother     Cancer Father     Diabetes Father     Cataracts Father     Allergies Son         food allergies    Amblyopia Neg Hx     Blindness Neg Hx     Glaucoma Neg Hx     Macular degeneration Neg Hx     Retinal detachment Neg Hx     Strabismus Neg Hx      Allergy:  Review of patient's allergies indicates:  No Known Allergies  Outpatient Encounter Medications as of 10/10/2022   Medication Sig Dispense Refill    fluticasone (FLONASE) 50 mcg/actuation nasal spray 1 spray by Each Nostril route once daily. Each nostril daily      ibuprofen (ADVIL,MOTRIN) 400 MG tablet Take 1 tablet (400 mg total) by mouth every 6 (six) hours as needed for Other (pain). 20 tablet 0    ondansetron (ZOFRAN) 4 MG tablet Take 1 tablet (4 mg total) by mouth every 6 (six) hours. 12 tablet 0    tamsulosin (FLOMAX) 0.4 mg Cap Take 1 capsule (0.4 mg total) by mouth once daily. for 7 days 7 capsule 0     No facility-administered encounter medications on file as of 10/10/2022.     Review of Systems   ROS  Physical Exam     Vitals:    10/10/22 1010   BP: 111/69   Pulse: 66     Physical Exam  Constitutional:       General: He is not in acute distress.     Appearance: He is well-developed. He is not diaphoretic.   HENT:      Head: Normocephalic and atraumatic.      Right Ear: External ear normal.      Left Ear: External ear normal.      Nose: Nose normal.   Eyes:      Conjunctiva/sclera: Conjunctivae  normal.      Pupils: Pupils are equal, round, and reactive to light.   Neck:      Thyroid: No thyromegaly.      Vascular: No JVD.      Trachea: No tracheal deviation.   Cardiovascular:      Rate and Rhythm: Normal rate and regular rhythm.      Heart sounds: Normal heart sounds. No murmur heard.    No friction rub. No gallop.   Pulmonary:      Effort: Pulmonary effort is normal. No respiratory distress.      Breath sounds: Normal breath sounds. No wheezing.   Chest:      Chest wall: No tenderness.   Abdominal:      General: Bowel sounds are normal. There is no distension.      Palpations: Abdomen is soft. There is no mass.      Tenderness: There is no abdominal tenderness. There is no guarding or rebound.   Genitourinary:     Penis: Normal. No tenderness.    Musculoskeletal:         General: No tenderness or deformity. Normal range of motion.      Cervical back: Normal range of motion and neck supple.   Lymphadenopathy:      Cervical: No cervical adenopathy.   Skin:     General: Skin is warm and dry.   Neurological:      Mental Status: He is alert and oriented to person, place, and time.   Psychiatric:         Behavior: Behavior normal.         Thought Content: Thought content normal.     Genitalia:  Scrotum: no rash or lesion  Normal symmetric epididymis without masses  Normal vas palpated  Normal size, symmetric testicles with no masses   Normal urethral meatus with no discharge  Normal circumcised penis with no lesion   Rectal:  Normal perineum and anus upon inspection.  Normal tone, no masses or tenderness;     LABS:  Lab Results   Component Value Date    PSA 0.89 09/08/2022     No results found for this or any previous visit.  Lab Results   Component Value Date    CREATININE 1.1 10/05/2022    CREATININE 1.1 09/08/2022    CREATININE 1.6 (H) 06/23/2022     No results found for this or any previous visit.  Urine Culture, Routine   Date Value Ref Range Status   10/15/2020 No growth  Final     No results found for:  HGBA1C    Radiology:    Assessment and Plan:  Diagnoses and all orders for this visit:    Kidney stone on left side    UPJ (ureteropelvic junction) obstruction    I reviewed his recent CT.  Small non-obstructive left lower pole stone noted.  On the left, he appears to have UPJ obstruction like dilated renal pelvis.    Because of his strong family hx as well as new onset recurrent stone disease in 2 years, will proceed with metabolic stone work up.  L[facility] ordered.      Follow-up:  Follow up litholink metabolic stone work up.

## 2022-11-08 ENCOUNTER — OFFICE VISIT (OUTPATIENT)
Dept: UROLOGY | Facility: CLINIC | Age: 47
End: 2022-11-08
Payer: COMMERCIAL

## 2022-11-08 DIAGNOSIS — R82.994 HYPERCALCIURIA: ICD-10-CM

## 2022-11-08 DIAGNOSIS — N20.0 KIDNEY STONE ON LEFT SIDE: Primary | ICD-10-CM

## 2022-11-08 PROCEDURE — 99214 OFFICE O/P EST MOD 30 MIN: CPT | Mod: 95,,, | Performed by: UROLOGY

## 2022-11-08 PROCEDURE — 99214 PR OFFICE/OUTPT VISIT, EST, LEVL IV, 30-39 MIN: ICD-10-PCS | Mod: 95,,, | Performed by: UROLOGY

## 2022-11-08 NOTE — PROGRESS NOTES
Established Patient - Audio Only Telehealth Visit     The patient location is: work  The chief complaint leading to consultation is: recurrent kidney stone, metabolic work up  Visit type: Virtual visit with audio only (telephone)  Total time spent with patient: 25 mins       The reason for the audio only service rather than synchronous audio and video virtual visit was related to technical difficulties or patient preference/necessity.     Each patient to whom I provide medical services by telemedicine is:  (1) informed of the relationship between the physician and patient and the respective role of any other health care provider with respect to management of the patient; and (2) notified that they may decline to receive medical services by telemedicine and may withdraw from such care at any time. Patient verbally consented to receive this service via voice-only telephone call.       HPI: doing a virtual visit to discuss his metabolic stone work up ([facility])    Gautam Ross is a 47 y.o. man who is here for the evaluation of recurrent kidney stone.    Went to the ER on 10/5/22  The patient reports earlier today he had left flank pain that felt like previous kidney stone pain.  At the time he was very nauseated, did not vomit.  The pain and nausea have improved since his arrival.  Onset:  Gradual  Location:  Left flank  Duration:  Episode lasted approximately 30 minutes  Associated Symptoms:  Denies fevers or chills, radiation of pain to testicles, hematuria  Since his visit to the ER, he did not have any symptoms.      He previoulsy underwent right ESWL.  Because the stone was rather difficult to visualize on his previous KUB, we decided to do a CT RSS.    Date: 10/23/2020  Pre-Op Diagnosis: Right renal stone  Post-Op Diagnosis: same  Procedure(s) Performed:   1.  Right ESWL  Findings:   Stone difficult to see on x-ray, so IVP was used to help locate the stone which was found in the lower pole of the right  kidney      His family members had many episodes of recurrent kidney stones.  His father and older brother had at least more than 20 stones in the past.  His older brother started to take Calcium supplement and stopped having recurrent stones.    He is a .      Past Medical History:   Diagnosis Date    Acute appendicitis 2018    Gallbladder attack 11/15/2018    Pain in right shoulder 2019    Postprocedural intraabdominal abscess 2018     Past Surgical History:   Procedure Laterality Date    APPENDECTOMY      EXTRACORPOREAL SHOCK WAVE LITHOTRIPSY Right 10/23/2020    Procedure: LITHOTRIPSY, ESWL;  Surgeon: Kirill Graves MD;  Location: [facility] OR [address] [place] [address];  Service: Urology;  Laterality: Right;  1.5 hours     INTRAVENOUS PYELOGRAM N/A 10/23/2020    Procedure: PYELOGRAM, INTRAVENOUS;  Surgeon: Kirill Graves MD;  Location: [facility] OR [address] [place] [address];  Service: Urology;  Laterality: N/A;    LAPAROSCOPIC CHOLECYSTECTOMY N/A 11/15/2018    Procedure: CHOLECYSTECTOMY, LAPAROSCOPIC;  Surgeon: Korey Keller Jr., MD;  Location: [facility];  Service: General;  Laterality: N/A;    LASIK Bilateral     UMBILICAL HERNIA REPAIR  10/16/13    open repair without mesh for 1cm defect; Dr. Rosales, [facility] [place]     Social History     Tobacco Use    Smoking status: Former     Types: Cigarettes     Quit date: 2002     Years since quittin.2    Smokeless tobacco: Never   Substance Use Topics    Alcohol use: No    Drug use: No     Family History   Problem Relation Age of Onset    Arthritis Mother     Cancer Father     Diabetes Father     Cataracts Father     Allergies Son         food allergies    Amblyopia Neg Hx     Blindness Neg Hx     Glaucoma Neg Hx     Macular degeneration Neg Hx     Retinal detachment Neg Hx     Strabismus Neg Hx      Allergy:  Review of patient's allergies indicates:  No Known Allergies  Outpatient Encounter Medications as of 2022   Medication Sig Dispense Refill    fluticasone (FLONASE) 50 mcg/actuation  nasal spray 1 spray by Each Nostril route once daily. Each nostril daily      ibuprofen (ADVIL,MOTRIN) 400 MG tablet Take 1 tablet (400 mg total) by mouth every 6 (six) hours as needed for Other (pain). 20 tablet 0    ondansetron (ZOFRAN) 4 MG tablet Take 1 tablet (4 mg total) by mouth every 6 (six) hours. 12 tablet 0    tamsulosin (FLOMAX) 0.4 mg Cap Take 1 capsule (0.4 mg total) by mouth once daily. for 7 days 7 capsule 0     No facility-administered encounter medications on file as of 11/8/2022.     Review of Systems   ROS  Physical Exam     There were no vitals filed for this visit.    Physical Exam  Constitutional:       General: He is not in acute distress.     Appearance: He is well-developed. He is not diaphoretic.   HENT:      Head: Normocephalic and atraumatic.      Right Ear: External ear normal.      Left Ear: External ear normal.      Nose: Nose normal.   Eyes:      Conjunctiva/sclera: Conjunctivae normal.      Pupils: Pupils are equal, round, and reactive to light.   Neck:      Thyroid: No thyromegaly.      Vascular: No JVD.      Trachea: No tracheal deviation.   Cardiovascular:      Rate and Rhythm: Normal rate and regular rhythm.      Heart sounds: Normal heart sounds. No murmur heard.    No friction rub. No gallop.   Pulmonary:      Effort: Pulmonary effort is normal. No respiratory distress.      Breath sounds: Normal breath sounds. No wheezing.   Chest:      Chest wall: No tenderness.   Abdominal:      General: Bowel sounds are normal. There is no distension.      Palpations: Abdomen is soft. There is no mass.      Tenderness: There is no abdominal tenderness. There is no guarding or rebound.   Genitourinary:     Penis: Normal. No tenderness.    Musculoskeletal:         General: No tenderness or deformity. Normal range of motion.      Cervical back: Normal range of motion and neck supple.   Lymphadenopathy:      Cervical: No cervical adenopathy.   Skin:     General: Skin is warm and dry.    Neurological:      Mental Status: He is alert and oriented to person, place, and time.   Psychiatric:         Behavior: Behavior normal.         Thought Content: Thought content normal.     Genitalia:  Scrotum: no rash or lesion  Normal symmetric epididymis without masses  Normal vas palpated  Normal size, symmetric testicles with no masses   Normal urethral meatus with no discharge  Normal circumcised penis with no lesion   Rectal:  Normal perineum and anus upon inspection.  Normal tone, no masses or tenderness;     LABS:  Lab Results   Component Value Date    PSA 0.89 09/08/2022     No results found for this or any previous visit.  Lab Results   Component Value Date    CREATININE 1.1 10/05/2022    CREATININE 1.1 09/08/2022    CREATININE 1.6 (H) 06/23/2022     No results found for this or any previous visit.  Urine Culture, Routine   Date Value Ref Range Status   10/15/2020 No growth  Final     No results found for: HGBA1C    Radiology:  CT RSS 10/5/22  1. Left renal lower pole new 3 mm nonobstructing calculus.    Assessment and Plan:  Diagnoses and all orders for this visit:    Kidney stone on left side  -     X-Ray Abdomen AP 1 View; Future  -     US Kidney; Future    Hypercalciuria  -     Basic Metabolic Panel; Future    I reviewed his [facility] study obtained on 10/24/22.  High calciumn 349 ( normal less than 250) and high Na 2888 ( normal 50 to 150) noted.  Normal volume ( greater than 2 liters/ day) and normal citric acid 883 ( normal greater than 450) noted.  Recommend HTCZ 25 mg once a day and will titrate up to 25 mg BID or 50 mg once a day.  Low salt diet recommended to keep his urine NA less than 150 if he can.  Take banana every day in order to avoid potassium loss.  Will hold off potassium citrate given he has adequate amount of potassium.    I reviewed his recent CT.  Small non-obstructive left lower pole stone noted.  On the left, he appears to have UPJ obstruction like dilated renal pelvis.  I  spent 25 minutes with the patient of which more than half was spent in direct consultation with the patient in regards to our treatment and plan.       Follow-up:  Follow up in about 3 months (around 2/8/2023), or renal US, for FABRIZIO COOMBS.      This service was not originating from a related E/M service provided within the previous 7 days nor will  to an E/M service or procedure within the next 24 hours or my soonest available appointment.  Prevailing standard of care was able to be met in this audio-only visit.

## 2023-04-20 ENCOUNTER — OFFICE VISIT (OUTPATIENT)
Dept: OPTOMETRY | Facility: CLINIC | Age: 48
End: 2023-04-20
Payer: COMMERCIAL

## 2023-04-20 DIAGNOSIS — H52.13 MYOPIA OF BOTH EYES WITH ASTIGMATISM AND PRESBYOPIA: Primary | ICD-10-CM

## 2023-04-20 DIAGNOSIS — H52.203 MYOPIA OF BOTH EYES WITH ASTIGMATISM AND PRESBYOPIA: Primary | ICD-10-CM

## 2023-04-20 DIAGNOSIS — H52.4 MYOPIA OF BOTH EYES WITH ASTIGMATISM AND PRESBYOPIA: Primary | ICD-10-CM

## 2023-04-20 DIAGNOSIS — Z46.0 FITTING AND ADJUSTMENT OF SPECTACLES AND CONTACT LENSES: Primary | ICD-10-CM

## 2023-04-20 DIAGNOSIS — Z98.890 HX OF LASIK: ICD-10-CM

## 2023-04-20 DIAGNOSIS — Z97.3 WEARS CONTACT LENSES: ICD-10-CM

## 2023-04-20 PROCEDURE — 92310 CONTACT LENS FITTING OU: CPT | Mod: CSM,S$GLB,, | Performed by: OPTOMETRIST

## 2023-04-20 PROCEDURE — 92310 PR CONTACT LENS FITTING (NO CHANGE): ICD-10-PCS | Mod: CSM,S$GLB,, | Performed by: OPTOMETRIST

## 2023-04-20 PROCEDURE — 1159F PR MEDICATION LIST DOCUMENTED IN MEDICAL RECORD: ICD-10-PCS | Mod: CPTII,S$GLB,, | Performed by: OPTOMETRIST

## 2023-04-20 PROCEDURE — 92015 DETERMINE REFRACTIVE STATE: CPT | Mod: S$GLB,,, | Performed by: OPTOMETRIST

## 2023-04-20 PROCEDURE — 99999 PR PBB SHADOW E&M-EST. PATIENT-LVL III: CPT | Mod: PBBFAC,,, | Performed by: OPTOMETRIST

## 2023-04-20 PROCEDURE — 92015 PR REFRACTION: ICD-10-PCS | Mod: S$GLB,,, | Performed by: OPTOMETRIST

## 2023-04-20 PROCEDURE — 1159F MED LIST DOCD IN RCRD: CPT | Mod: CPTII,S$GLB,, | Performed by: OPTOMETRIST

## 2023-04-20 PROCEDURE — 1160F PR REVIEW ALL MEDS BY PRESCRIBER/CLIN PHARMACIST DOCUMENTED: ICD-10-PCS | Mod: CPTII,S$GLB,, | Performed by: OPTOMETRIST

## 2023-04-20 PROCEDURE — 92012 PR EYE EXAM, EST PATIENT,INTERMED: ICD-10-PCS | Mod: S$GLB,,, | Performed by: OPTOMETRIST

## 2023-04-20 PROCEDURE — 99999 PR PBB SHADOW E&M-EST. PATIENT-LVL III: ICD-10-PCS | Mod: PBBFAC,,, | Performed by: OPTOMETRIST

## 2023-04-20 PROCEDURE — 92012 INTRM OPH EXAM EST PATIENT: CPT | Mod: S$GLB,,, | Performed by: OPTOMETRIST

## 2023-04-20 PROCEDURE — 1160F RVW MEDS BY RX/DR IN RCRD: CPT | Mod: CPTII,S$GLB,, | Performed by: OPTOMETRIST

## 2023-04-20 NOTE — PROGRESS NOTES
HPI     Contact Lens Fit            Comments: MEME: 1/18/2022          Comments    Presents today for routine/CLFU. Pt reports slight vision changes. Need   updates rx for glasses and contacts. Pt denies eye pain, floaters and   flashes. No gtts.    S/P LASIK OU          Last edited by Marge Graham MA on 4/20/2023  8:27 AM.            Assessment /Plan     For exam results, see Encounter Report.    Myopia of both eyes with astigmatism and presbyopia    Hx of LASIK    Wears contact lenses      1. Residual myopia/astig/presby sp LASIK 15 years ago.  Pt wearing ONE DAY AV MOIST ASTIG Cls w good fit, but wishes to try OASYS 1-Day.  Wears otc readers when needed  2. Pt wishes to defer DFE today--has project due today    PLAN:    1. Sent pt to optical to get TRIALS in new brand.  I do NOT need to see at disp  2. Cont DW sched--exchange nightly  3.  Wrote spex Rx with optional bifocal  4. If no problems will call for CLRx, rtc 1 yr

## 2023-04-27 ENCOUNTER — PATIENT MESSAGE (OUTPATIENT)
Dept: OPTOMETRY | Facility: CLINIC | Age: 48
End: 2023-04-27
Payer: COMMERCIAL

## 2023-06-13 ENCOUNTER — TELEPHONE (OUTPATIENT)
Dept: UROLOGY | Facility: CLINIC | Age: 48
End: 2023-06-13
Payer: COMMERCIAL

## 2023-06-13 ENCOUNTER — HOSPITAL ENCOUNTER (OUTPATIENT)
Dept: RADIOLOGY | Facility: HOSPITAL | Age: 48
Discharge: HOME | End: 2023-06-13
Attending: UROLOGY
Payer: COMMERCIAL

## 2023-06-13 ENCOUNTER — PATIENT MESSAGE (OUTPATIENT)
Dept: UROLOGY | Facility: CLINIC | Age: 48
End: 2023-06-13
Payer: COMMERCIAL

## 2023-06-13 DIAGNOSIS — N20.0 KIDNEY STONE ON LEFT SIDE: ICD-10-CM

## 2023-06-13 PROCEDURE — 74018 RADEX ABDOMEN 1 VIEW: CPT | Mod: TC,PO

## 2023-06-13 PROCEDURE — 74018 RADEX ABDOMEN 1 VIEW: CPT | Mod: 26,,, | Performed by: RADIOLOGY

## 2023-06-13 PROCEDURE — 74018 XR ABDOMEN AP 1 VIEW: ICD-10-PCS | Mod: 26,,, | Performed by: RADIOLOGY

## 2023-06-13 NOTE — TELEPHONE ENCOUNTER
Last seen by me on 11/8/22 for kidney stone disease.    KUB 6/13/23  Postop cholecystectomy.  1 cm size vague calcific density right lateral flank stable.  5 mm size stone nonobstructive left inferior renal pole, also seen on CT.  Otherwise no urinary tract stones confirmed.    Please have him follow up with me in clinic after his renal US 6/14/23.

## 2023-06-14 ENCOUNTER — HOSPITAL ENCOUNTER (OUTPATIENT)
Dept: RADIOLOGY | Facility: HOSPITAL | Age: 48
Discharge: HOME | End: 2023-06-14
Attending: UROLOGY
Payer: COMMERCIAL

## 2023-06-14 DIAGNOSIS — N20.0 KIDNEY STONE ON LEFT SIDE: ICD-10-CM

## 2023-06-14 PROCEDURE — 76770 US KIDNEY: ICD-10-PCS | Mod: 26,,, | Performed by: RADIOLOGY

## 2023-06-14 PROCEDURE — 76770 US EXAM ABDO BACK WALL COMP: CPT | Mod: 26,,, | Performed by: RADIOLOGY

## 2023-06-14 PROCEDURE — 76770 US EXAM ABDO BACK WALL COMP: CPT | Mod: TC

## 2023-07-17 ENCOUNTER — OFFICE VISIT (OUTPATIENT)
Dept: UROLOGY | Facility: CLINIC | Age: 48
End: 2023-07-17
Payer: COMMERCIAL

## 2023-07-17 VITALS
HEIGHT: 68 IN | HEART RATE: 86 BPM | WEIGHT: 190.69 LBS | DIASTOLIC BLOOD PRESSURE: 80 MMHG | BODY MASS INDEX: 28.9 KG/M2 | SYSTOLIC BLOOD PRESSURE: 118 MMHG

## 2023-07-17 DIAGNOSIS — N20.0 KIDNEY STONE ON LEFT SIDE: Primary | ICD-10-CM

## 2023-07-17 PROCEDURE — 3008F PR BODY MASS INDEX (BMI) DOCUMENTED: ICD-10-PCS | Mod: CPTII,S$GLB,, | Performed by: UROLOGY

## 2023-07-17 PROCEDURE — 3079F DIAST BP 80-89 MM HG: CPT | Mod: CPTII,S$GLB,, | Performed by: UROLOGY

## 2023-07-17 PROCEDURE — 3074F SYST BP LT 130 MM HG: CPT | Mod: CPTII,S$GLB,, | Performed by: UROLOGY

## 2023-07-17 PROCEDURE — 99214 PR OFFICE/OUTPT VISIT, EST, LEVL IV, 30-39 MIN: ICD-10-PCS | Mod: 57,S$GLB,, | Performed by: UROLOGY

## 2023-07-17 PROCEDURE — 1159F PR MEDICATION LIST DOCUMENTED IN MEDICAL RECORD: ICD-10-PCS | Mod: CPTII,S$GLB,, | Performed by: UROLOGY

## 2023-07-17 PROCEDURE — 99999 PR PBB SHADOW E&M-EST. PATIENT-LVL III: ICD-10-PCS | Mod: PBBFAC,,, | Performed by: UROLOGY

## 2023-07-17 PROCEDURE — 3079F PR MOST RECENT DIASTOLIC BLOOD PRESSURE 80-89 MM HG: ICD-10-PCS | Mod: CPTII,S$GLB,, | Performed by: UROLOGY

## 2023-07-17 PROCEDURE — 99999 PR PBB SHADOW E&M-EST. PATIENT-LVL III: CPT | Mod: PBBFAC,,, | Performed by: UROLOGY

## 2023-07-17 PROCEDURE — 1159F MED LIST DOCD IN RCRD: CPT | Mod: CPTII,S$GLB,, | Performed by: UROLOGY

## 2023-07-17 PROCEDURE — 99214 OFFICE O/P EST MOD 30 MIN: CPT | Mod: 57,S$GLB,, | Performed by: UROLOGY

## 2023-07-17 PROCEDURE — 3074F PR MOST RECENT SYSTOLIC BLOOD PRESSURE < 130 MM HG: ICD-10-PCS | Mod: CPTII,S$GLB,, | Performed by: UROLOGY

## 2023-07-17 PROCEDURE — 3008F BODY MASS INDEX DOCD: CPT | Mod: CPTII,S$GLB,, | Performed by: UROLOGY

## 2023-07-17 NOTE — H&P (VIEW-ONLY)
CC: kidney stone follow up discuss his metabolic stone work up ([facility])    Gautam Ross is a 48 y.o. man who is here for the evaluation of recurrent kidney stone.  Had a metabolic stone work up ([facility]) and we had a discussion on his last visit.    He previoulsy underwent right ESWL.  Date: 10/23/2020  Pre-Op Diagnosis: Right renal stone  Post-Op Diagnosis: same  Procedure(s) Performed:   1.  Right ESWL  Findings:   Stone difficult to see on x-ray, so IVP was used to help locate the stone which was found in the lower pole of the right kidney    He is here today for kidney stone follow up with renal US and KUB.  Previously he had a CT RSS in 10/2022 revealing a new left lower pole stone.    His family members had many episodes of recurrent kidney stones.  His father and older brother had at least more than 20 stones in the past.  His older brother started to take Calcium supplement and stopped having recurrent stones.    He is a .      Past Medical History:   Diagnosis Date    Acute appendicitis 5/13/2018    Gallbladder attack 11/15/2018    Pain in right shoulder 9/16/2019    Postprocedural intraabdominal abscess 5/24/2018     Past Surgical History:   Procedure Laterality Date    APPENDECTOMY      EXTRACORPOREAL SHOCK WAVE LITHOTRIPSY Right 10/23/2020    Procedure: LITHOTRIPSY, ESWL;  Surgeon: Kirill Graves MD;  Location: [address];  Service: Urology;  Laterality: Right;  1.5 hours     INTRAVENOUS PYELOGRAM N/A 10/23/2020    Procedure: PYELOGRAM, INTRAVENOUS;  Surgeon: Kirill Graves MD;  Location: [address];  Service: Urology;  Laterality: N/A;    LAPAROSCOPIC CHOLECYSTECTOMY N/A 11/15/2018    Procedure: CHOLECYSTECTOMY, LAPAROSCOPIC;  Surgeon: Korey Keller Jr., MD;  [facility];  Service: General;  Laterality: N/A;    LASIK Bilateral     UMBILICAL HERNIA REPAIR  10/16/13    open repair without mesh for 1cm defect; Dr. Rosales, [facility] [place]     Social History     Tobacco Use     Smoking status: Former     Types: Cigarettes     Quit date: 2002     Years since quittin.9    Smokeless tobacco: Never   Substance Use Topics    Alcohol use: No    Drug use: No     Family History   Problem Relation Age of Onset    Arthritis Mother     Cancer Father     Diabetes Father     Cataracts Father     Allergies Son         food allergies    Amblyopia Neg Hx     Blindness Neg Hx     Glaucoma Neg Hx     Macular degeneration Neg Hx     Retinal detachment Neg Hx     Strabismus Neg Hx      Allergy:  Review of patient's allergies indicates:  No Known Allergies  Outpatient Encounter Medications as of 2023   Medication Sig Dispense Refill    amoxicillin (AMOXIL) 500 MG capsule Take 1 capsule (500 mg total) by mouth every 12 (twelve) hours. 14 capsule 1    fluticasone (FLONASE) 50 mcg/actuation nasal spray 1 spray by Each Nostril route once daily. Each nostril daily      ibuprofen (ADVIL,MOTRIN) 400 MG tablet Take 1 tablet (400 mg total) by mouth every 6 (six) hours as needed for Other (pain). 20 tablet 0    ondansetron (ZOFRAN) 4 MG tablet Take 1 tablet (4 mg total) by mouth every 6 (six) hours. 12 tablet 0    hydrocortisone 2.5 % cream Apply topically 2 (two) times daily. for 10 days 20 g 3    tamsulosin (FLOMAX) 0.4 mg Cap Take 1 capsule (0.4 mg total) by mouth once daily. for 7 days 7 capsule 0     No facility-administered encounter medications on file as of 2023.     Review of Systems   ROS  Physical Exam     Vitals:    23 0835   BP: 118/80   Pulse: 86       Physical Exam  Constitutional:       General: He is not in acute distress.     Appearance: He is well-developed. He is not diaphoretic.   HENT:      Head: Normocephalic and atraumatic.      Right Ear: External ear normal.      Left Ear: External ear normal.      Nose: Nose normal.   Eyes:      Conjunctiva/sclera: Conjunctivae normal.      Pupils: Pupils are equal, round, and reactive to light.   Neck:      Thyroid: No  thyromegaly.      Vascular: No JVD.      Trachea: No tracheal deviation.   Cardiovascular:      Rate and Rhythm: Normal rate and regular rhythm.      Heart sounds: Normal heart sounds. No murmur heard.    No friction rub. No gallop.   Pulmonary:      Effort: Pulmonary effort is normal. No respiratory distress.      Breath sounds: Normal breath sounds. No wheezing.   Chest:      Chest wall: No tenderness.   Abdominal:      General: Bowel sounds are normal. There is no distension.      Palpations: Abdomen is soft. There is no mass.      Tenderness: There is no abdominal tenderness. There is no guarding or rebound.   Genitourinary:     Penis: Normal. No tenderness.    Musculoskeletal:         General: No tenderness or deformity. Normal range of motion.      Cervical back: Normal range of motion and neck supple.   Lymphadenopathy:      Cervical: No cervical adenopathy.   Skin:     General: Skin is warm and dry.   Neurological:      Mental Status: He is alert and oriented to person, place, and time.   Psychiatric:         Behavior: Behavior normal.         Thought Content: Thought content normal.       LABS:  Lab Results   Component Value Date    PSA 0.89 09/08/2022     No results found for this or any previous visit.  Lab Results   Component Value Date    CREATININE 1.1 10/05/2022    CREATININE 1.1 09/08/2022    CREATININE 1.6 (H) 06/23/2022     No results found for this or any previous visit.  Urine Culture, Routine   Date Value Ref Range Status   10/15/2020 No growth  Final     No results found for: HGBA1C    Radiology:  US 6/14/23  10 mm nonobstructive left renal calculus new since the abdominal ultrasound of June 24, 2022.  KUB 6//14/23  Postop cholecystectomy.  1 cm size vague calcific density right lateral flank stable.  Otherwise no urinary tract stones confirmed.    CT RSS 10/5/22  1. Left renal lower pole new 3 mm nonobstructing calculus.    Assessment and Plan:  Gautam was seen today for  follow-up.    Diagnoses and all orders for this visit:    Kidney stone on left side      I reviewed his recent renal US and KUB and compared to CT RSS from 10/2022.  I think that his left lower pole might have gotten bigger but it does not appear to be 1 cm in size on his KUB.  However, given the nature of stone progression, we agreed that he should undergo another ESWL.    Will plan left ESWL.  Nature and risks of operation explained.    I reviewed his [facility] study obtained on 10/24/22.  High calciumn 349 ( normal less than 250) and high Na 2888 ( normal 50 to 150) noted.  Normal volume ( greater than 2 liters/ day) and normal citric acid 883 ( normal greater than 450) noted.  Recommend HTCZ 25 mg once a day and will titrate up to 25 mg BID or 50 mg once a day.  Low salt diet recommended to keep his urine NA less than 150 if he can.  Take banana every day in order to avoid potassium loss.  Will hold off potassium citrate given he has adequate amount of potassium.    I spent 25 minutes with the patient of which more than half was spent in direct consultation with the patient in regards to our treatment and plan.       Follow-up:  Follow up in about 16 days (around 8/2/2023), or left ESWL.

## 2023-07-17 NOTE — PROGRESS NOTES
CC: kidney stone follow up discuss his metabolic stone work up ([facility])    Gautam Ross is a 48 y.o. man who is here for the evaluation of recurrent kidney stone.  Had a metabolic stone work up ([facility]) and we had a discussion on his last visit.    He previoulsy underwent right ESWL.  Date: 10/23/2020  Pre-Op Diagnosis: Right renal stone  Post-Op Diagnosis: same  Procedure(s) Performed:   1.  Right ESWL  Findings:   Stone difficult to see on x-ray, so IVP was used to help locate the stone which was found in the lower pole of the right kidney    He is here today for kidney stone follow up with renal US and KUB.  Previously he had a CT RSS in 10/2022 revealing a new left lower pole stone.    His family members had many episodes of recurrent kidney stones.  His father and older brother had at least more than 20 stones in the past.  His older brother started to take Calcium supplement and stopped having recurrent stones.    He is a .      Past Medical History:   Diagnosis Date    Acute appendicitis 5/13/2018    Gallbladder attack 11/15/2018    Pain in right shoulder 9/16/2019    Postprocedural intraabdominal abscess 5/24/2018     Past Surgical History:   Procedure Laterality Date    APPENDECTOMY      EXTRACORPOREAL SHOCK WAVE LITHOTRIPSY Right 10/23/2020    Procedure: LITHOTRIPSY, ESWL;  Surgeon: Kirill Graves MD;  Location: [address];  Service: Urology;  Laterality: Right;  1.5 hours     INTRAVENOUS PYELOGRAM N/A 10/23/2020    Procedure: PYELOGRAM, INTRAVENOUS;  Surgeon: Kirill Graves MD;  Location: [address];  Service: Urology;  Laterality: N/A;    LAPAROSCOPIC CHOLECYSTECTOMY N/A 11/15/2018    Procedure: CHOLECYSTECTOMY, LAPAROSCOPIC;  Surgeon: Korey Keller Jr., MD;  Location: [facility];  Service: General;  Laterality: N/A;    LASIK Bilateral     UMBILICAL HERNIA REPAIR  10/16/13    open repair without mesh for 1cm defect; Dr. Rosales, [facility] [place]     Social History     Tobacco Use     Smoking status: Former     Types: Cigarettes     Quit date: 2002     Years since quittin.9    Smokeless tobacco: Never   Substance Use Topics    Alcohol use: No    Drug use: No     Family History   Problem Relation Age of Onset    Arthritis Mother     Cancer Father     Diabetes Father     Cataracts Father     Allergies Son         food allergies    Amblyopia Neg Hx     Blindness Neg Hx     Glaucoma Neg Hx     Macular degeneration Neg Hx     Retinal detachment Neg Hx     Strabismus Neg Hx      Allergy:  Review of patient's allergies indicates:  No Known Allergies  Outpatient Encounter Medications as of 2023   Medication Sig Dispense Refill    amoxicillin (AMOXIL) 500 MG capsule Take 1 capsule (500 mg total) by mouth every 12 (twelve) hours. 14 capsule 1    fluticasone (FLONASE) 50 mcg/actuation nasal spray 1 spray by Each Nostril route once daily. Each nostril daily      ibuprofen (ADVIL,MOTRIN) 400 MG tablet Take 1 tablet (400 mg total) by mouth every 6 (six) hours as needed for Other (pain). 20 tablet 0    ondansetron (ZOFRAN) 4 MG tablet Take 1 tablet (4 mg total) by mouth every 6 (six) hours. 12 tablet 0    hydrocortisone 2.5 % cream Apply topically 2 (two) times daily. for 10 days 20 g 3    tamsulosin (FLOMAX) 0.4 mg Cap Take 1 capsule (0.4 mg total) by mouth once daily. for 7 days 7 capsule 0     No facility-administered encounter medications on file as of 2023.     Review of Systems   ROS  Physical Exam     Vitals:    23 0835   BP: 118/80   Pulse: 86       Physical Exam  Constitutional:       General: He is not in acute distress.     Appearance: He is well-developed. He is not diaphoretic.   HENT:      Head: Normocephalic and atraumatic.      Right Ear: External ear normal.      Left Ear: External ear normal.      Nose: Nose normal.   Eyes:      Conjunctiva/sclera: Conjunctivae normal.      Pupils: Pupils are equal, round, and reactive to light.   Neck:      Thyroid: No  thyromegaly.      Vascular: No JVD.      Trachea: No tracheal deviation.   Cardiovascular:      Rate and Rhythm: Normal rate and regular rhythm.      Heart sounds: Normal heart sounds. No murmur heard.    No friction rub. No gallop.   Pulmonary:      Effort: Pulmonary effort is normal. No respiratory distress.      Breath sounds: Normal breath sounds. No wheezing.   Chest:      Chest wall: No tenderness.   Abdominal:      General: Bowel sounds are normal. There is no distension.      Palpations: Abdomen is soft. There is no mass.      Tenderness: There is no abdominal tenderness. There is no guarding or rebound.   Genitourinary:     Penis: Normal. No tenderness.    Musculoskeletal:         General: No tenderness or deformity. Normal range of motion.      Cervical back: Normal range of motion and neck supple.   Lymphadenopathy:      Cervical: No cervical adenopathy.   Skin:     General: Skin is warm and dry.   Neurological:      Mental Status: He is alert and oriented to person, place, and time.   Psychiatric:         Behavior: Behavior normal.         Thought Content: Thought content normal.       LABS:  Lab Results   Component Value Date    PSA 0.89 09/08/2022     No results found for this or any previous visit.  Lab Results   Component Value Date    CREATININE 1.1 10/05/2022    CREATININE 1.1 09/08/2022    CREATININE 1.6 (H) 06/23/2022     No results found for this or any previous visit.  Urine Culture, Routine   Date Value Ref Range Status   10/15/2020 No growth  Final     No results found for: HGBA1C    Radiology:  US 6/14/23  10 mm nonobstructive left renal calculus new since the abdominal ultrasound of June 24, 2022.  KUB 6//14/23  Postop cholecystectomy.  1 cm size vague calcific density right lateral flank stable.  Otherwise no urinary tract stones confirmed.    CT RSS 10/5/22  1. Left renal lower pole new 3 mm nonobstructing calculus.    Assessment and Plan:  Gautam was seen today for  follow-up.    Diagnoses and all orders for this visit:    Kidney stone on left side      I reviewed his recent renal US and KUB and compared to CT RSS from 10/2022.  I think that his left lower pole might have gotten bigger but it does not appear to be 1 cm in size on his KUB.  However, given the nature of stone progression, we agreed that he should undergo another ESWL.    Will plan left ESWL.  Nature and risks of operation explained.    I reviewed his [facility] study obtained on 10/24/22.  High calciumn 349 ( normal less than 250) and high Na 2888 ( normal 50 to 150) noted.  Normal volume ( greater than 2 liters/ day) and normal citric acid 883 ( normal greater than 450) noted.  Recommend HTCZ 25 mg once a day and will titrate up to 25 mg BID or 50 mg once a day.  Low salt diet recommended to keep his urine NA less than 150 if he can.  Take banana every day in order to avoid potassium loss.  Will hold off potassium citrate given he has adequate amount of potassium.    I spent 25 minutes with the patient of which more than half was spent in direct consultation with the patient in regards to our treatment and plan.       Follow-up:  Follow up in about 16 days (around 8/2/2023), or left ESWL.

## 2023-07-18 ENCOUNTER — TELEPHONE (OUTPATIENT)
Dept: UROLOGY | Facility: CLINIC | Age: 48
End: 2023-07-18
Payer: COMMERCIAL

## 2023-07-18 DIAGNOSIS — N20.0 KIDNEY STONE ON LEFT SIDE: Primary | ICD-10-CM

## 2023-07-19 ENCOUNTER — TELEPHONE (OUTPATIENT)
Dept: UROLOGY | Facility: CLINIC | Age: 48
End: 2023-07-19
Payer: COMMERCIAL

## 2023-07-19 NOTE — TELEPHONE ENCOUNTER
----- Message from Kirill Graves MD sent at 7/19/2023  3:36 PM CDT -----  Regarding: RE: pt advice  Contact: [phone]  Could you let him know that it will be afternoon but  will call you for a time of arrival, Tues before his Weds surgery?  Thx  Dr. Graves  ----- Message -----  From: Ella Patel LPN  Sent: 7/19/2023   1:47 PM CDT  To: Kirill Graves MD  Subject: FW: pt advice                                      ----- Message -----  From: Maya Wayne  Sent: 7/19/2023  12:57 PM CDT  To: [facility]  Subject: pt advice                                        Pt needing time and  instructions for procedure 8/2/23. Sherly moser

## 2023-07-19 NOTE — TELEPHONE ENCOUNTER
Called and spoke to the patient to insure that arrival times for surgery are given out the day before surgery due to the surgery schedule changing constantly until the day before surgery. Informed the patient I will call him the day before surgery with arrival time and pre-op instructions. Patient verbalized understanding.

## 2023-07-31 NOTE — PRE-PROCEDURE INSTRUCTIONS

## 2023-08-01 ENCOUNTER — TELEPHONE (OUTPATIENT)
Dept: UROLOGY | Facility: CLINIC | Age: 48
End: 2023-08-01
Payer: COMMERCIAL

## 2023-08-01 ENCOUNTER — ANESTHESIA EVENT (OUTPATIENT)
Dept: SURGERY | Facility: HOSPITAL | Age: 48
End: 2023-08-01
Payer: COMMERCIAL

## 2023-08-01 NOTE — TELEPHONE ENCOUNTER
Called pt to confirm arrival time of 10:45 am for procedure on 8/2/23. Gave pt NPO instructions and gave pt opportunity to ask questions. Pt verbalized understanding.

## 2023-08-02 ENCOUNTER — ANESTHESIA (OUTPATIENT)
Dept: SURGERY | Facility: HOSPITAL | Age: 48
End: 2023-08-02
Payer: COMMERCIAL

## 2023-08-02 ENCOUNTER — PATIENT MESSAGE (OUTPATIENT)
Dept: SURGERY | Facility: HOSPITAL | Age: 48
End: 2023-08-02

## 2023-08-02 ENCOUNTER — HOSPITAL ENCOUNTER (OUTPATIENT)
Facility: HOSPITAL | Age: 48
Discharge: HOME | End: 2023-08-02
Attending: UROLOGY | Admitting: UROLOGY
Payer: COMMERCIAL

## 2023-08-02 VITALS
TEMPERATURE: 98 F | SYSTOLIC BLOOD PRESSURE: 112 MMHG | BODY MASS INDEX: 29 KG/M2 | HEART RATE: 58 BPM | WEIGHT: 190.69 LBS | RESPIRATION RATE: 18 BRPM | OXYGEN SATURATION: 99 % | DIASTOLIC BLOOD PRESSURE: 76 MMHG

## 2023-08-02 DIAGNOSIS — N20.0 NEPHROLITHIASIS: Primary | ICD-10-CM

## 2023-08-02 PROCEDURE — 63600175 PHARM REV CODE 636 W HCPCS: Performed by: NURSE ANESTHETIST, CERTIFIED REGISTERED

## 2023-08-02 PROCEDURE — 25000003 PHARM REV CODE 250: Performed by: NURSE ANESTHETIST, CERTIFIED REGISTERED

## 2023-08-02 PROCEDURE — D9220A PRA ANESTHESIA: Mod: CRNA,,, | Performed by: NURSE ANESTHETIST, CERTIFIED REGISTERED

## 2023-08-02 PROCEDURE — 50590 PR FRAGMENT KIDNEY STONE/ ESWL: ICD-10-PCS | Mod: LT,,, | Performed by: UROLOGY

## 2023-08-02 PROCEDURE — 37000009 HC ANESTHESIA EA ADD 15 MINS: Performed by: UROLOGY

## 2023-08-02 PROCEDURE — 71000015 HC POSTOP RECOV 1ST HR: Performed by: UROLOGY

## 2023-08-02 PROCEDURE — 63600175 PHARM REV CODE 636 W HCPCS: Performed by: STUDENT IN AN ORGANIZED HEALTH CARE EDUCATION/TRAINING PROGRAM

## 2023-08-02 PROCEDURE — 37000008 HC ANESTHESIA 1ST 15 MINUTES: Performed by: UROLOGY

## 2023-08-02 PROCEDURE — 36000705 HC OR TIME LEV I EA ADD 15 MIN: Performed by: UROLOGY

## 2023-08-02 PROCEDURE — 50590 FRAGMENTING OF KIDNEY STONE: CPT | Mod: LT,,, | Performed by: UROLOGY

## 2023-08-02 PROCEDURE — D9220A PRA ANESTHESIA: ICD-10-PCS | Mod: CRNA,,, | Performed by: NURSE ANESTHETIST, CERTIFIED REGISTERED

## 2023-08-02 PROCEDURE — 71000044 HC DOSC ROUTINE RECOVERY FIRST HOUR: Performed by: UROLOGY

## 2023-08-02 PROCEDURE — 36000704 HC OR TIME LEV I 1ST 15 MIN: Performed by: UROLOGY

## 2023-08-02 PROCEDURE — 25000003 PHARM REV CODE 250: Performed by: STUDENT IN AN ORGANIZED HEALTH CARE EDUCATION/TRAINING PROGRAM

## 2023-08-02 PROCEDURE — D9220A PRA ANESTHESIA: Mod: ANES,,, | Performed by: ANESTHESIOLOGY

## 2023-08-02 PROCEDURE — D9220A PRA ANESTHESIA: ICD-10-PCS | Mod: ANES,,, | Performed by: ANESTHESIOLOGY

## 2023-08-02 RX ORDER — PROPOFOL 10 MG/ML
VIAL (ML) INTRAVENOUS
Status: DISCONTINUED | OUTPATIENT
Start: 2023-08-02 | End: 2023-08-02

## 2023-08-02 RX ORDER — FENTANYL CITRATE 50 UG/ML
INJECTION, SOLUTION INTRAMUSCULAR; INTRAVENOUS
Status: DISCONTINUED | OUTPATIENT
Start: 2023-08-02 | End: 2023-08-02

## 2023-08-02 RX ORDER — TAMSULOSIN HYDROCHLORIDE 0.4 MG/1
0.4 CAPSULE ORAL DAILY
Qty: 7 CAPSULE | Refills: 0 | Status: SHIPPED | OUTPATIENT
Start: 2023-08-02 | End: 2023-08-09

## 2023-08-02 RX ORDER — LIDOCAINE HYDROCHLORIDE 20 MG/ML
INJECTION INTRAVENOUS
Status: DISCONTINUED | OUTPATIENT
Start: 2023-08-02 | End: 2023-08-02

## 2023-08-02 RX ORDER — DROPERIDOL 2.5 MG/ML
0.62 INJECTION, SOLUTION INTRAMUSCULAR; INTRAVENOUS ONCE AS NEEDED
Status: DISCONTINUED | OUTPATIENT
Start: 2023-08-02 | End: 2023-08-02 | Stop reason: HOSPADM

## 2023-08-02 RX ORDER — ONDANSETRON 2 MG/ML
4 INJECTION INTRAMUSCULAR; INTRAVENOUS ONCE AS NEEDED
Status: DISCONTINUED | OUTPATIENT
Start: 2023-08-02 | End: 2023-08-02 | Stop reason: HOSPADM

## 2023-08-02 RX ORDER — PHENYLEPHRINE HYDROCHLORIDE 10 MG/ML
INJECTION INTRAVENOUS
Status: DISCONTINUED | OUTPATIENT
Start: 2023-08-02 | End: 2023-08-02

## 2023-08-02 RX ORDER — HYDROMORPHONE HYDROCHLORIDE 1 MG/ML
0.2 INJECTION, SOLUTION INTRAMUSCULAR; INTRAVENOUS; SUBCUTANEOUS EVERY 5 MIN PRN
Status: DISCONTINUED | OUTPATIENT
Start: 2023-08-02 | End: 2023-08-02 | Stop reason: HOSPADM

## 2023-08-02 RX ORDER — OXYCODONE HYDROCHLORIDE 5 MG/1
5 TABLET ORAL EVERY 6 HOURS PRN
Qty: 6 TABLET | Refills: 0 | Status: SHIPPED | OUTPATIENT
Start: 2023-08-02

## 2023-08-02 RX ORDER — MIDAZOLAM HYDROCHLORIDE 1 MG/ML
INJECTION, SOLUTION INTRAMUSCULAR; INTRAVENOUS
Status: DISCONTINUED | OUTPATIENT
Start: 2023-08-02 | End: 2023-08-02

## 2023-08-02 RX ORDER — ONDANSETRON 2 MG/ML
INJECTION INTRAMUSCULAR; INTRAVENOUS
Status: DISCONTINUED | OUTPATIENT
Start: 2023-08-02 | End: 2023-08-02

## 2023-08-02 RX ADMIN — PHENYLEPHRINE HYDROCHLORIDE 50 MCG: 10 INJECTION INTRAVENOUS at 01:08

## 2023-08-02 RX ADMIN — MIDAZOLAM HYDROCHLORIDE 2 MG: 1 INJECTION, SOLUTION INTRAMUSCULAR; INTRAVENOUS at 01:08

## 2023-08-02 RX ADMIN — PHENYLEPHRINE HYDROCHLORIDE 100 MCG: 10 INJECTION INTRAVENOUS at 01:08

## 2023-08-02 RX ADMIN — ONDANSETRON 4 MG: 2 INJECTION INTRAMUSCULAR; INTRAVENOUS at 01:08

## 2023-08-02 RX ADMIN — LIDOCAINE HYDROCHLORIDE 100 MG: 20 INJECTION INTRAVENOUS at 01:08

## 2023-08-02 RX ADMIN — PROPOFOL 200 MG: 10 INJECTION, EMULSION INTRAVENOUS at 01:08

## 2023-08-02 RX ADMIN — CEFAZOLIN 2 G: 2 INJECTION, POWDER, FOR SOLUTION INTRAMUSCULAR; INTRAVENOUS at 01:08

## 2023-08-02 RX ADMIN — FENTANYL CITRATE 100 MCG: 50 INJECTION, SOLUTION INTRAMUSCULAR; INTRAVENOUS at 01:08

## 2023-08-02 RX ADMIN — PROPOFOL 100 MG: 10 INJECTION, EMULSION INTRAVENOUS at 01:08

## 2023-08-02 RX ADMIN — SODIUM CHLORIDE: 0.9 INJECTION, SOLUTION INTRAVENOUS at 01:08

## 2023-08-02 NOTE — BRIEF OP NOTE
[facility] (1st Fl)  Brief Operative Note    Surgery Date: 8/2/2023     Surgeon(s) and Role:     * Kirill Graves MD - Primary     * Feliciano Vargas MD - Resident - Assisting        Pre-op Diagnosis:  Kidney stone on left side [N20.0]    Post-op Diagnosis:  Post-Op Diagnosis Codes:     * Kidney stone on left side [N20.0]    Procedure(s) (LRB):  LITHOTRIPSY, ESWL (Left)    Anesthesia: General    Operative Findings:   -Left lower pole stone completely fragmented and not visible at the end of the case    Estimated Blood Loss: * No values recorded between 8/2/2023  1:26 PM and 8/2/2023  2:05 PM *         Specimens:   Specimen (24h ago, onward)      None              Discharge Note    OUTCOME: Patient tolerated treatment/procedure well without complication and is now ready for discharge.    DISPOSITION: Home or Self Care    FINAL DIAGNOSIS:  nephrolithiasis    FOLLOWUP: In clinic    DISCHARGE INSTRUCTIONS:  No discharge procedures on file.

## 2023-08-02 NOTE — ANESTHESIA PREPROCEDURE EVALUATION
08/02/2023  Gautam Ross is a 48 y.o., male.      Pre-op Assessment          Review of Systems  Anesthesia Hx:  No problems with previous Anesthesia    Cardiovascular:   Denies Hypertension.  Denies CAD.     Functional Capacity Can you climb two flights of stairs? ==> Yes    Pulmonary:   Denies Asthma.  Denies Sleep Apnea.    Renal/:   Denies Chronic Renal Disease.     Hepatic/GI:   Denies PUD. Denies GERD. Denies Liver Disease.    Neurological:   Denies CVA. Denies Seizures.    Endocrine:   Denies Diabetes. Denies Hypothyroidism.        Physical Exam  General: Alert    Airway:  Mallampati: I   Mouth Opening: Normal  TM Distance: Normal  Tongue: Normal  Neck ROM: Normal ROM    Dental:  Intact        Anesthesia Plan  Type of Anesthesia, risks & benefits discussed:    Anesthesia Type: Gen ETT, Gen Natural Airway, MAC  Intra-op Monitoring Plan: Standard ASA Monitors  Post Op Pain Control Plan: multimodal analgesia and IV/PO Opioids PRN  Induction:  IV  Airway Plan: Direct  Informed Consent: Informed consent signed with the Patient and all parties understand the risks and agree with anesthesia plan.  All questions answered.   ASA Score: 1    Ready For Surgery From Anesthesia Perspective.     .

## 2023-08-02 NOTE — INTERVAL H&P NOTE
The patient has been examined and the H&P has been reviewed:    I concur with the findings and no changes have occurred since H&P was written.    Surgery risks, benefits and alternative options discussed and understood by patient/family.    Urine dipstick - negative for all components.      There are no hospital problems to display for this patient.

## 2023-08-02 NOTE — PROGRESS NOTES
Plan of care reviewed with pt & spouse, both verbalized understanding, pt progressing with plan of care, denies nausea & pain, tolerating PO, reviewed all DC instructions, home meds, scripts, when to call MD, when to follow-up, answered questions.

## 2023-08-02 NOTE — OP NOTE
[facility]  Operative Note    Date: 08/02/2023    Pre-Op Diagnosis: Left renal stone    Post-Op Diagnosis: same    Procedure(s) Performed:   1.  Left ESWL    Specimen(s): none    Staff Surgeon: Kirill Graves MD    Assistant Surgeon: Feliciano Vargas MD    Anesthesia: LMA    Indications: Gautam Ross is a 48 y.o. male with a  left renal stone presenting for definitive stone management. Potential UPJ obstruction/narrowing discussed, but patient wanted to proceed with the risks of the stone not passing.   The stone is radio-opaque on KUB.      Findings:   -Left lower pole stone completely fragmented and not visible at the end of the case    Estimated Blood Loss: none    Drains: none    Procedure in Detail:  After risk, benefits, and possible complications of ESWL were discussed, the patient elected to undergo the procedure and informed consent was obtained. All questions were answered in the pre-operative area and the patient was transferred to the lithotripsy suite and placed on the lithotriptor table. SCDs were applied and working.  Time out was preformed, danilo-procedural antibiotics were administered.    The patient's Left-sided stone was aligned on the X-Y- and Z axis.  MAC anesthesia was administered.  When the patient was adequately sedated, 3000 thousand shocks were administered at a rate of 60-90 shocks per second, beginning at 16 KV of power and advanced to 26 KV. Intermittent fluoroscopy was used to monitor fragmentation of the stone.    At the end of the procedure the stone was not visible on fluoro.      The patient tolerated the procedure well and was transferred to the PACU in stable condition.      Plan: The patient will follow up with Dr. Graves in 4 weeks with a kub prior.  The patient was given prescriptions for oxycodone and flomax.  The patient will strain all urine and bring any fragments to the follow up appt for stone analysis.      Feliciano Vargas MD

## 2023-08-02 NOTE — TRANSFER OF CARE
Anesthesia Transfer of Care Note    Patient: Gautam Ross    Procedure(s) Performed: Procedure(s) (LRB):  LITHOTRIPSY, ESWL (Left)    Patient location: PACU    Anesthesia Type: general    Transport from OR: Transported from OR on room air with adequate spontaneous ventilation    Post pain: adequate analgesia    Post assessment: no apparent anesthetic complications    Post vital signs: stable    Level of consciousness: awake and alert    Nausea/Vomiting: no nausea/vomiting    Complications: none    Transfer of care protocol was followed      Last vitals:   Visit Vitals  /59 (BP Location: Left arm, Patient Position: Lying)   Pulse 65   Temp 36.7 °C (98.1 °F) (Temporal)   Resp 18   Wt 86.5 kg (190 lb 11.2 oz)   SpO2 99%   BMI 29.00 kg/m²

## 2023-08-02 NOTE — ANESTHESIA PROCEDURE NOTES
Intubation    Date/Time: 8/2/2023 1:17 PM    Performed by: Salome Corrigan CRNA  Authorized by: Yovany Jimenez MD    Intubation:     Induction:  Intravenous    Mask Ventilation:  Easy mask    Attempts:  1    Attempted By:  CRNA    Difficult Airway Encountered?: No      Complications:  None    Airway Device:  Supraglottic airway/LMA    Airway Device Size:  4.0    Style/Cuff Inflation:  Cuffed (inflated to minimal occlusive pressure)    Placement Verified By:  Capnometry    Complicating Factors:  None    Findings Post-Intubation:  BS equal bilateral

## 2023-08-02 NOTE — PATIENT INSTRUCTIONS
ESWL post-op instructions:  You may see some blood in your urine while the stone fragments are passing and a few days afterward. Do not be alarmed, even if the urine was clear for a while. Push fluids and refrain from strenuous activity until clearing occurs. If you have difficulty passing clots or don't improve, call us. You can also try sitting in a warm tub of water to help to urinate if needed. Avoid medications such as Aspirin, Advil, Motrin, Plavix, or Coumadin, which thin the blood and cause bleeding.  If you have never had your stones analyzed, strain all urine and bring stone fragments with you to your next appointment.  Diet:  You may return to your normal diet immediately. Alcohol, spicy foods, acidy foods and drinks with caffeine may cause irritation or frequency and should be used in moderation. To keep your urine flowing freely and to avoid constipation, drink plenty of fluids during the day (8-10 glasses).  Activity:  While the kidney is healing do not engage in strenuous activity. If you are active, you may see more blood in the urine. We would suggest cutting down your activity under these circumstances until the bleeding has stopped, perhaps two weeks.  Bowels:  It is important to keep your bowels regular during the postoperative period. Straining with bowel movements can cause bleeding. A bowel movement every other day is reasonable. Use a mild over-the-counter laxative if needed, such as Milk of Magnesia 2-3 tablespoons, or 1-2 Dulcolax tablets. Call if you continue to have problems. Narcotics can worsen constipation; if you had been taking narcotics for pain, before, during or after your surgery, you may be constipated. Ditropan for bladder spasms may also cause constipation.  Problems you should report to us:  Fevers over 101.5 degrees Fahrenheit.   Inability to urinate.   Drug reactions (hives, rash, nausea, vomiting, diarrhea)   Severe burning or pain with urination that is not improving.    You will also have some burning with urination. This is normal after stone therapy and is also expected while the stent is in place. This burning should be mild or moderate and should improve over time. Severe burning, especially when it is not improving, can be a sign of infection.  Follow-up  After the stone has been fragmented you will likely need an x-ray prior to next clinic appointment    Bring stone fragments with you to your next appt.     In some select cases it is important to not leave the string on the stent.  The stent is usually removed 1-4 weeks after treatment.    Call Urology at [phone] with any problems

## 2023-08-03 NOTE — ANESTHESIA POSTPROCEDURE EVALUATION
Anesthesia Post Evaluation    Patient: Gautam Ross    Procedure(s) Performed: Procedure(s) (LRB):  LITHOTRIPSY, ESWL (Left)    Final Anesthesia Type: general      Patient location during evaluation: PACU  Patient participation: Yes- Able to Participate  Level of consciousness: awake  Post-procedure vital signs: reviewed and stable  Pain management: adequate  Airway patency: patent    PONV status at discharge: No PONV  Anesthetic complications: no      Cardiovascular status: blood pressure returned to baseline  Respiratory status: unassisted  Hydration status: euvolemic  Follow-up not needed.          Vitals Value Taken Time   /76 08/02/23 1500   Temp 36.7 °C (98 °F) 08/02/23 1500   Pulse 50 08/02/23 1502   Resp 17 08/02/23 1500   SpO2 100 % 08/02/23 1502   Vitals shown include unvalidated device data.      No case tracking events are documented in the log.      Pain/Marily Score: Marily Score: 9 (8/2/2023  2:20 PM)

## 2023-08-05 ENCOUNTER — OFFICE VISIT (OUTPATIENT)
Dept: URGENT CARE | Facility: CLINIC | Age: 48
End: 2023-08-05
Payer: COMMERCIAL

## 2023-08-05 VITALS
OXYGEN SATURATION: 96 % | RESPIRATION RATE: 18 BRPM | HEIGHT: 68 IN | HEART RATE: 76 BPM | TEMPERATURE: 98 F | WEIGHT: 190 LBS | SYSTOLIC BLOOD PRESSURE: 119 MMHG | DIASTOLIC BLOOD PRESSURE: 83 MMHG | BODY MASS INDEX: 28.79 KG/M2

## 2023-08-05 DIAGNOSIS — J39.1 PHARYNGEAL ABSCESS: Primary | ICD-10-CM

## 2023-08-05 DIAGNOSIS — T81.9XXA POSTOPERATIVE OR SURGICAL COMPLICATION, INITIAL ENCOUNTER: ICD-10-CM

## 2023-08-05 PROCEDURE — 99213 PR OFFICE/OUTPT VISIT, EST, LEVL III, 20-29 MIN: ICD-10-PCS | Mod: S$GLB,,, | Performed by: NURSE PRACTITIONER

## 2023-08-05 PROCEDURE — 99213 OFFICE O/P EST LOW 20 MIN: CPT | Mod: S$GLB,,, | Performed by: NURSE PRACTITIONER

## 2023-08-05 RX ORDER — AMOXICILLIN 875 MG/1
875 TABLET, FILM COATED ORAL EVERY 12 HOURS
Qty: 20 TABLET | Refills: 0 | Status: SHIPPED | OUTPATIENT
Start: 2023-08-05 | End: 2023-08-15

## 2023-08-05 NOTE — PATIENT INSTRUCTIONS
Postoperative or surgical complication, initial encounter    Pharyngeal abscess    -     amoxicillin (AMOXIL) 875 MG tablet; Take 1 tablet (875 mg total) by mouth every 12 (twelve) hours. for 10 days  Dispense: 20 tablet; Refill: 0        STRICT ED Precautions    Report to ED or call 911 if new or worsening symptoms.        - Rest at home.     - Drink plenty of fluids so you won't get dehydrated.    -Sore throat recommendations: Warm fluids, warm salt water gargles, throat lozenges, tea, honey, soup, or drinking something cold or frozen.  Throat lozenges or sprays help reduce pain. Gargling with warm saltwater (1/4 teaspoon of salt in 1/2 cup of warm water) or an OTC anesthetic gargle may be useful for irritation.    - Fever/Pain recommendations:      Alternate Tylenol or Ibuprofen as directed for fever/pain.   Take ibuprofen 600-800 mg every 6-8 hours for pain and inflammation. Do not take ibuprofen if you have a history of GI bleeding, kidney disease, or if you take blood thinners.    Tylenol/acetaminophen 650-1000 mg every 6-8 hours for added pain relief.  Avoid tylenol if you have a history of liver disease.     When to seek medical advice  Call your healthcare provider right away if any of these occur:  Fever that is poorly controlled with OTC fever reducing medication  New or worsening ear pain, sinus pain, or headache  Stiff neck  You can't swallow liquids or you can't open your mouth wide because of throat pain  Signs of dehydration. These include very dark urine or no urine, sunken eyes, and dizziness.  Trouble breathing or noisy breathing  Muffled voice  Rash     If your symptoms worsen or fail to improve you should go to Emergency Department.

## 2023-08-05 NOTE — PROGRESS NOTES
"Subjective:      Patient ID: Gautam Ross is a 48 y.o. male.    Vitals:  height is 5' 8" (1.727 m) and weight is 86.2 kg (190 lb). His oral temperature is 98.4 °F (36.9 °C). His blood pressure is 119/83 and his pulse is 76. His respiration is 18 and oxygen saturation is 96%.     Chief Complaint: Sore Throat      Pt is a 49 yo male presenting with left side sore throat r/t intubation complication from a procedure 3 days ago.  Sore throat occurred immediately after awakening from anesthesia but it has steadily worsened.      Sore Throat   This is a new problem. The problem has been unchanged. The pain is at a severity of 7/10. The pain is moderate. Associated symptoms include trouble swallowing ("hurts"). Pertinent negatives include no congestion, coughing, diarrhea, ear pain, headaches, shortness of breath or vomiting. He has tried acetaminophen for the symptoms. The treatment provided no relief.       Constitution: Negative for chills, fatigue and fever.   HENT:  Positive for sore throat and trouble swallowing ("hurts"). Negative for ear pain, congestion and sinus pain.    Cardiovascular:  Negative for chest pain.   Respiratory:  Negative for cough, sputum production and shortness of breath.    Gastrointestinal:  Negative for nausea, vomiting and diarrhea.   Neurological:  Negative for headaches.      Objective:     Physical Exam   Constitutional: He is oriented to person, place, and time.   HENT:   Head: Normocephalic.   Ears:   Right Ear: Hearing and external ear normal. No no drainage, swelling or tenderness. Tympanic membrane is not erythematous, not retracted and not bulging. No middle ear effusion.   Left Ear: Hearing and external ear normal. No no drainage, swelling or tenderness. Tympanic membrane is not erythematous, not retracted and not bulging.  No middle ear effusion.   Nose: Nose normal. No mucosal edema, rhinorrhea or purulent discharge. Right sinus exhibits no maxillary sinus tenderness and " no frontal sinus tenderness. Left sinus exhibits no maxillary sinus tenderness and no frontal sinus tenderness.   Mouth/Throat: Uvula is midline and mucous membranes are normal. No trismus in the jaw. No uvula swelling. Oropharyngeal exudate and posterior oropharyngeal erythema present. No posterior oropharyngeal edema.       Eyes: EOM are normal.   Cardiovascular: Normal rate and regular rhythm.   Pulmonary/Chest: Effort normal and breath sounds normal. No respiratory distress.   Musculoskeletal: Normal range of motion.         General: Normal range of motion.   Neurological: He is alert and oriented to person, place, and time.   Skin: Skin is warm and dry.   Nursing note and vitals reviewed.      Assessment:     1. Pharyngeal abscess    2. Postoperative or surgical complication, initial encounter      Discussed case and presentation with  to determine appropriate to d/c home with amoxicillin 875 mg and strict ED precautions if symptoms worsen or do not improve.    Plan:       Pharyngeal abscess  -     amoxicillin (AMOXIL) 875 MG tablet; Take 1 tablet (875 mg total) by mouth every 12 (twelve) hours. for 10 days  Dispense: 20 tablet; Refill: 0    Postoperative or surgical complication, initial encounter      Patient Instructions     Postoperative or surgical complication, initial encounter    Pharyngeal abscess    -     amoxicillin (AMOXIL) 875 MG tablet; Take 1 tablet (875 mg total) by mouth every 12 (twelve) hours. for 10 days  Dispense: 20 tablet; Refill: 0        STRICT ED Precautions    Report to ED or call 911 if new or worsening symptoms.        - Rest at home.     - Drink plenty of fluids so you won't get dehydrated.    -Sore throat recommendations: Warm fluids, warm salt water gargles, throat lozenges, tea, honey, soup, or drinking something cold or frozen.  Throat lozenges or sprays help reduce pain. Gargling with warm saltwater (1/4 teaspoon of salt in 1/2 cup of warm water) or an OTC anesthetic  gargle may be useful for irritation.    - Fever/Pain recommendations:      Alternate Tylenol or Ibuprofen as directed for fever/pain.   Take ibuprofen 600-800 mg every 6-8 hours for pain and inflammation. Do not take ibuprofen if you have a history of GI bleeding, kidney disease, or if you take blood thinners.    Tylenol/acetaminophen 650-1000 mg every 6-8 hours for added pain relief.  Avoid tylenol if you have a history of liver disease.     When to seek medical advice  Call your healthcare provider right away if any of these occur:  Fever that is poorly controlled with OTC fever reducing medication  New or worsening ear pain, sinus pain, or headache  Stiff neck  You can't swallow liquids or you can't open your mouth wide because of throat pain  Signs of dehydration. These include very dark urine or no urine, sunken eyes, and dizziness.  Trouble breathing or noisy breathing  Muffled voice  Rash     If your symptoms worsen or fail to improve you should go to Emergency Department.

## 2023-08-28 ENCOUNTER — OFFICE VISIT (OUTPATIENT)
Dept: UROLOGY | Facility: CLINIC | Age: 48
End: 2023-08-28
Payer: COMMERCIAL

## 2023-08-28 ENCOUNTER — HOSPITAL ENCOUNTER (OUTPATIENT)
Dept: RADIOLOGY | Facility: HOSPITAL | Age: 48
Discharge: HOME | End: 2023-08-28
Attending: STUDENT IN AN ORGANIZED HEALTH CARE EDUCATION/TRAINING PROGRAM
Payer: COMMERCIAL

## 2023-08-28 VITALS
SYSTOLIC BLOOD PRESSURE: 120 MMHG | WEIGHT: 187.38 LBS | DIASTOLIC BLOOD PRESSURE: 77 MMHG | HEART RATE: 65 BPM | BODY MASS INDEX: 28.4 KG/M2 | HEIGHT: 68 IN

## 2023-08-28 DIAGNOSIS — N20.0 KIDNEY STONE ON LEFT SIDE: Primary | ICD-10-CM

## 2023-08-28 DIAGNOSIS — N20.0 NEPHROLITHIASIS: ICD-10-CM

## 2023-08-28 DIAGNOSIS — N22 CALCULUS OF URINARY TRACT IN DISEASES CLASSIFIED ELSEWHERE: ICD-10-CM

## 2023-08-28 PROCEDURE — 99499 UNLISTED E&M SERVICE: CPT | Mod: S$GLB,,, | Performed by: UROLOGY

## 2023-08-28 PROCEDURE — 99999 PR PBB SHADOW E&M-EST. PATIENT-LVL III: ICD-10-PCS | Mod: PBBFAC,,, | Performed by: UROLOGY

## 2023-08-28 PROCEDURE — 99999 PR PBB SHADOW E&M-EST. PATIENT-LVL III: CPT | Mod: PBBFAC,,, | Performed by: UROLOGY

## 2023-08-28 PROCEDURE — 99499 NO LOS: ICD-10-PCS | Mod: S$GLB,,, | Performed by: UROLOGY

## 2023-08-28 PROCEDURE — 74018 RADEX ABDOMEN 1 VIEW: CPT | Mod: 26,,, | Performed by: RADIOLOGY

## 2023-08-28 PROCEDURE — 74018 RADEX ABDOMEN 1 VIEW: CPT | Mod: TC

## 2023-08-28 PROCEDURE — 74018 XR KUB: ICD-10-PCS | Mod: 26,,, | Performed by: RADIOLOGY

## 2023-08-28 RX ORDER — HYDROCHLOROTHIAZIDE 25 MG/1
25 TABLET ORAL DAILY
Qty: 30 TABLET | Refills: 11 | Status: SHIPPED | OUTPATIENT
Start: 2023-08-28 | End: 2024-08-27

## 2023-08-28 NOTE — PROGRESS NOTES
CC: s/p left ESWL     Gautam Ross is a 48 y.o. man who is here for the evaluation of recurrent kidney stone.    Hx of a  left renal stone presenting for definitive stone management. Potential UPJ obstruction/narrowing discussed, but patient wanted to proceed with the risks of the stone not passing.   The stone is radio-opaque on KUB.    Date: 08/02/2023  Pre-Op Diagnosis: Left renal stone  Post-Op Diagnosis: same  Procedure(s) Performed:   1.  Left ESWL  Findings:   -Left lower pole stone completely fragmented and not visible at the end of the case    Following his surgery, he thinks he passed some stone fragments.  However, he did not have any stone particle.    He previoulsy underwent right ESWL.  Date: 10/23/2020  Pre-Op Diagnosis: Right renal stone  Post-Op Diagnosis: same  Procedure(s) Performed:   1.  Right ESWL  Findings:   Stone difficult to see on x-ray, so IVP was used to help locate the stone which was found in the lower pole of the right kidney    His family members had many episodes of recurrent kidney stones.  His father and older brother had at least more than 20 stones in the past.  His older brother started to take Calcium supplement and stopped having recurrent stones.    He is a .      Past Medical History:   Diagnosis Date    Acute appendicitis 5/13/2018    Gallbladder attack 11/15/2018    Pain in right shoulder 9/16/2019    Postprocedural intraabdominal abscess 5/24/2018     Past Surgical History:   Procedure Laterality Date    APPENDECTOMY      EXTRACORPOREAL SHOCK WAVE LITHOTRIPSY Right 10/23/2020    Procedure: LITHOTRIPSY, ESWL;  Surgeon: Kirill Graves MD;  Location: [facility] OR [address] [place] [address];  Service: Urology;  Laterality: Right;  1.5 hours     EXTRACORPOREAL SHOCK WAVE LITHOTRIPSY Left 8/2/2023    Procedure: LITHOTRIPSY, ESWL;  Surgeon: Kirill Graves MD;  Location: [facility] OR [address] [place] [address];  Service: Urology;  Laterality: Left;    INTRAVENOUS PYELOGRAM N/A 10/23/2020    Procedure:  PYELOGRAM, INTRAVENOUS;  Surgeon: Kirill Graves MD;  Location: [address];  Service: Urology;  Laterality: N/A;    LAPAROSCOPIC CHOLECYSTECTOMY N/A 11/15/2018    Procedure: CHOLECYSTECTOMY, LAPAROSCOPIC;  Surgeon: Korey Keller Jr., MD;  Location: [facility];  Service: General;  Laterality: N/A;    LASIK Bilateral     UMBILICAL HERNIA REPAIR  10/16/13    open repair without mesh for 1cm defect; [place] [facility] [place]     Social History     Tobacco Use    Smoking status: Former     Current packs/day: 0.00     Types: Cigarettes     Quit date: 2002     Years since quittin.0    Smokeless tobacco: Never   Substance Use Topics    Alcohol use: No    Drug use: No     Family History   Problem Relation Age of Onset    Arthritis Mother     Cancer Father     Diabetes Father     Cataracts Father     Allergies Son         food allergies    Amblyopia Neg Hx     Blindness Neg Hx     Glaucoma Neg Hx     Macular degeneration Neg Hx     Retinal detachment Neg Hx     Strabismus Neg Hx      Allergy:  Review of patient's allergies indicates:  No Known Allergies  Outpatient Encounter Medications as of 2023   Medication Sig Dispense Refill    [] amoxicillin (AMOXIL) 875 MG tablet Take 1 tablet (875 mg total) by mouth every 12 (twelve) hours. for 10 days 20 tablet 0    hydroCHLOROthiazide (HYDRODIURIL) 25 MG tablet Take 1 tablet (25 mg total) by mouth once daily. 30 tablet 11    oxyCODONE (ROXICODONE) 5 MG immediate release tablet Take 1 tablet (5 mg total) by mouth every 6 (six) hours as needed. (Patient not taking: Reported on 2023) 6 tablet 0    tamsulosin (FLOMAX) 0.4 mg Cap Take 1 capsule (0.4 mg total) by mouth once daily. for 7 days 7 capsule 0    [DISCONTINUED] amoxicillin (AMOXIL) 500 MG capsule Take 1 capsule (500 mg total) by mouth every 12 (twelve) hours. (Patient not taking: Reported on 2023) 14 capsule 1    [DISCONTINUED] hydrocortisone 2.5 % cream Apply topically 2 (two) times daily. for 10  days 20 g 3     No facility-administered encounter medications on file as of 8/28/2023.     Review of Systems   ROS  Physical Exam     Vitals:    08/28/23 1334   BP: 120/77   Pulse: 65       Physical Exam  Constitutional:       General: He is not in acute distress.     Appearance: He is well-developed. He is not diaphoretic.   HENT:      Head: Normocephalic and atraumatic.      Right Ear: External ear normal.      Left Ear: External ear normal.      Nose: Nose normal.   Eyes:      Conjunctiva/sclera: Conjunctivae normal.      Pupils: Pupils are equal, round, and reactive to light.   Neck:      Thyroid: No thyromegaly.      Vascular: No JVD.      Trachea: No tracheal deviation.   Cardiovascular:      Rate and Rhythm: Normal rate and regular rhythm.      Heart sounds: Normal heart sounds. No murmur heard.     No friction rub. No gallop.   Pulmonary:      Effort: Pulmonary effort is normal. No respiratory distress.      Breath sounds: Normal breath sounds. No wheezing.   Chest:      Chest wall: No tenderness.   Abdominal:      General: Bowel sounds are normal. There is no distension.      Palpations: Abdomen is soft. There is no mass.      Tenderness: There is no abdominal tenderness. There is no guarding or rebound.   Genitourinary:     Penis: Normal. No tenderness.    Musculoskeletal:         General: No tenderness or deformity. Normal range of motion.      Cervical back: Normal range of motion and neck supple.   Lymphadenopathy:      Cervical: No cervical adenopathy.   Skin:     General: Skin is warm and dry.   Neurological:      Mental Status: He is alert and oriented to person, place, and time.   Psychiatric:         Behavior: Behavior normal.         Thought Content: Thought content normal.         LABS:  Lab Results   Component Value Date    PSA 0.89 09/08/2022     No results found for this or any previous visit.  Lab Results   Component Value Date    CREATININE 1.1 10/05/2022    CREATININE 1.1 09/08/2022     "CREATININE 1.6 (H) 06/23/2022     No results found for this or any previous visit.  Urine Culture, Routine   Date Value Ref Range Status   10/15/2020 No growth  Final     No results found for: "HGBA1C"    Radiology:  US 6/14/23  10 mm nonobstructive left renal calculus new since the abdominal ultrasound of June 24, 2022.  KUB 6//14/23  Postop cholecystectomy.  1 cm size vague calcific density right lateral flank stable.  Otherwise no urinary tract stones confirmed.    KUB today 8/28/23  No suspicious calcifications are seen.    CT RSS 10/5/22  1. Left renal lower pole new 3 mm nonobstructing calculus.    Assessment and Plan:  Gautam was seen today for follow-up.    Diagnoses and all orders for this visit:    Kidney stone on left side  -     Basic Metabolic Panel; Future  -     hydroCHLOROthiazide (HYDRODIURIL) 25 MG tablet; Take 1 tablet (25 mg total) by mouth once daily.    Calculus of urinary tract in diseases classified elsewhere  -     CT Renal Stone Study ABD Pelvis WO; Future      S/p left ESWL.  No stone seen on KUB.  Pt did not save any stone fragment.    His brother took Calcium supplement to prevent his stone disease.  I don't recommend calcium supplement but rather he can try Kidney  (over-the-counter supplement)  Drink plenty of water to maintain 2 liters of UO a day.  High amount of citric acid intake recommended.    I reviewed his Litholink study obtained on 10/24/22.  High calciumn 349 ( normal less than 250) and high Na 2888 ( normal 50 to 150) noted.  Normal volume ( greater than 2 liters/ day) and normal citric acid 883 ( normal greater than 450) noted.  Recommend HTCZ 25 mg once a day and will titrate up to 25 mg BID or 50 mg once a day.  Low salt diet recommended to keep his urine NA less than 150 if he can.  Take banana every day in order to avoid potassium loss.    I spent 25 minutes with the patient of which more than half was spent in direct consultation with the patient in regards to our " treatment and plan.       Follow-up:  Follow up BMP, for CT RSS.

## 2023-12-01 ENCOUNTER — OFFICE VISIT (OUTPATIENT)
Dept: URGENT CARE | Facility: CLINIC | Age: 48
End: 2023-12-01
Payer: COMMERCIAL

## 2023-12-01 VITALS
SYSTOLIC BLOOD PRESSURE: 127 MMHG | WEIGHT: 180 LBS | OXYGEN SATURATION: 96 % | HEART RATE: 88 BPM | DIASTOLIC BLOOD PRESSURE: 87 MMHG | BODY MASS INDEX: 25.77 KG/M2 | RESPIRATION RATE: 18 BRPM | TEMPERATURE: 99 F | HEIGHT: 70 IN

## 2023-12-01 DIAGNOSIS — L03.114 CELLULITIS OF LEFT UPPER EXTREMITY: Primary | ICD-10-CM

## 2023-12-01 PROCEDURE — 96372 THER/PROPH/DIAG INJ SC/IM: CPT | Mod: S$GLB,,,

## 2023-12-01 PROCEDURE — 99213 PR OFFICE/OUTPT VISIT, EST, LEVL III, 20-29 MIN: ICD-10-PCS | Mod: 25,S$GLB,,

## 2023-12-01 PROCEDURE — 99213 OFFICE O/P EST LOW 20 MIN: CPT | Mod: 25,S$GLB,,

## 2023-12-01 PROCEDURE — 96372 PR INJECTION,THERAP/PROPH/DIAG2ST, IM OR SUBCUT: ICD-10-PCS | Mod: S$GLB,,,

## 2023-12-01 RX ORDER — CEFTRIAXONE 1 G/1
1 INJECTION, POWDER, FOR SOLUTION INTRAMUSCULAR; INTRAVENOUS
Status: COMPLETED | OUTPATIENT
Start: 2023-12-01 | End: 2023-12-01

## 2023-12-01 RX ORDER — SULFAMETHOXAZOLE AND TRIMETHOPRIM 800; 160 MG/1; MG/1
1 TABLET ORAL 2 TIMES DAILY
Qty: 14 TABLET | Refills: 0 | Status: SHIPPED | OUTPATIENT
Start: 2023-12-01 | End: 2023-12-08

## 2023-12-01 RX ADMIN — CEFTRIAXONE 1 G: 1 INJECTION, POWDER, FOR SOLUTION INTRAMUSCULAR; INTRAVENOUS at 07:12

## 2023-12-02 NOTE — PATIENT INSTRUCTIONS
Take Bactrim twice a day for 7 days.  If your infection worsens or if you develop a fever please go to the emergency room.  Wash wound daily with mild soap and water.    What care is needed at home?   Call your regular doctor to let them know you were in the ED. Make a follow-up appointment if you were told to.  The doctor may want you to keep your wound covered as it heals. You may want to use a thin layer of antibiotic ointment to help keep the wound moist. This will also keep the dressing from sticking to the wound.  After 24 hours, you can gently wash the wound with soap and water. Pat dry and put on a clean dressing.  Change your dressing once a day or every other day.  Always wash your hands before and after touching the wound.  Each time you change the dressing, look closely at the wound to be sure it is healing the right way. The wound may have a yellowish discharge and this is normal.  Avoid picking the scab or scratching the site which may cause more irritation.  Do not soak in water or swim with an open wound.  When do I need to get emergency help?   Return to the ED if:   The pain in and around the area gets much worse.  There is a bad smell or pus (thick yellow, green, or gray fluid) coming from your wound.  You develop a fever of 102.2 F (39 C) or higher or chills.  You notice a crunchy feeling or blisters in the skin around the wound.  The redness around your wound gets bigger or is spreading up your arm or leg.  When do I need to call the doctor?   Fluid that is not pus drains from your wound.  Your swelling doesnt improve or gets worse.  You develop a fever of 100°F (37.8°C) or higher.  You have new or worsening symptoms.  - Rest.    - Drink plenty of fluids.    - Acetaminophen (tylenol) or Ibuprofen (advil,motrin) as directed as needed for fever/pain. Avoid tylenol if you have a history of liver disease. Do not take ibuprofen if you have a history of GI bleeding, kidney disease, or if you take  blood thinners.     - Follow up with your PCP or specialty clinic as directed in the next 1-2 weeks if not improved or as needed.  You can call [phone] to schedule an appointment with the appropriate provider.    - Go to the ER or seek medical attention immediately if you develop new or worsening symptoms.     - You must understand that you have received an Urgent Care treatment only and that you may be released before all of your medical problems are known or treated.   - You, the patient, will arrange for follow up care as instructed.   - If your condition worsens or fails to improve we recommend that you receive another evaluation at the ER immediately or contact your PCP to discuss your concerns or return here.

## 2023-12-02 NOTE — PROGRESS NOTES
"Subjective:      Patient ID: Gautam Ross is a 48 y.o. male.    Vitals:  height is 5' 9.6" (1.768 m) and weight is 81.6 kg (180 lb). His temperature is 99.4 °F (37.4 °C). His blood pressure is 127/87 and his pulse is 88. His respiration is 18 and oxygen saturation is 96%.     Chief Complaint: Rash    Patient presents with increased redness and swelling to the left forearm where he had tattoo placed 3 days ago.  Forearm is also warm to touch.  Denies any fever or discharge.  Patient states he has been cleaning it daily with mild soap and water.        Rash  This is a new problem. The current episode started in the past 7 days. The problem has been gradually worsening since onset. The affected locations include the left axilla. Pertinent negatives include no anorexia, congestion, cough, diarrhea, eye pain, facial edema, fatigue, fever, joint pain, nail changes, rhinorrhea, shortness of breath, sore throat or vomiting. There is no history of allergies, asthma, eczema or varicella.       Constitution: Negative for activity change, appetite change, chills, sweating, fatigue and fever.   HENT:  Negative for ear pain, ear discharge, foreign body in ear, tinnitus, hearing loss, congestion, foreign body in nose, sinus pressure and sore throat.    Neck: Negative for neck stiffness and painful lymph nodes.   Cardiovascular:  Negative for chest trauma, chest pain and leg swelling.   Eyes:  Negative for eye trauma, foreign body in eye, eye discharge, eye itching and eye pain.   Respiratory:  Negative for sleep apnea, chest tightness, cough, sputum production, bloody sputum, COPD, shortness of breath and asthma.    Gastrointestinal:  Negative for abdominal trauma, abdominal pain, abdominal bloating, history of abdominal surgery, vomiting and diarrhea.   Endocrine: hair loss, cold intolerance and heat intolerance.   Genitourinary:  Negative for dysuria, frequency, urgency, urine decreased, flank pain and bladder " incontinence.   Musculoskeletal:  Negative for pain, trauma, joint pain, joint swelling, abnormal ROM of joint, arthritis and gout.   Skin:  Positive for erythema. Negative for color change, pale, rash, wound, abrasion, laceration, lesion, skin thickening/induration, puncture wound and bruising.   Allergic/Immunologic: Negative for environmental allergies, seasonal allergies, food allergies, eczema and asthma.   Neurological:  Negative for dizziness, history of vertigo, light-headedness, passing out, facial drooping, disorientation and altered mental status.   Hematologic/Lymphatic: Negative for swollen lymph nodes and easy bruising/bleeding. Does not bruise/bleed easily.   Psychiatric/Behavioral:  Negative for altered mental status, disorientation, confusion, agitation and nervous/anxious. The patient is not nervous/anxious.       Objective:     Physical Exam   Constitutional: He is oriented to person, place, and time. He appears well-developed.   HENT:   Head: Normocephalic and atraumatic. Head is without abrasion, without contusion and without laceration.   Ears:   Right Ear: External ear normal.   Left Ear: External ear normal.   Nose: Nose normal.   Mouth/Throat: Oropharynx is clear and moist and mucous membranes are normal.   Eyes: Conjunctivae, EOM and lids are normal. Pupils are equal, round, and reactive to light.   Neck: Trachea normal and phonation normal. Neck supple.   Cardiovascular: Normal rate, regular rhythm and normal heart sounds.   Pulmonary/Chest: Effort normal and breath sounds normal. No stridor. No respiratory distress.   Musculoskeletal: Normal range of motion.         General: Swelling and tenderness present. No deformity or signs of injury. Normal range of motion.      Right lower leg: No edema.      Left lower leg: No edema.   Neurological: He is alert and oriented to person, place, and time.   Skin: Skin is warm, dry, intact, not pale and no rash. Capillary refill takes less than 2  seconds. erythema No abrasion, No burn, No bruising, No ecchymosis and No lesion jaundice  Psychiatric: His speech is normal and behavior is normal. Judgment and thought content normal.   Nursing note and vitals reviewed.      Assessment:     1. Cellulitis of left upper extremity        Plan:       Cellulitis of left upper extremity  -     cefTRIAXone injection 1 g  -     sulfamethoxazole-trimethoprim 800-160mg (BACTRIM DS) 800-160 mg Tab; Take 1 tablet by mouth 2 (two) times daily. for 7 days  Dispense: 14 tablet; Refill: 0          Medical Decision Making:   Urgent Care Management:  Patient given Rocephin injection here in clinic and Bactrim for 7 days.  Patient is instructed to continue to monitor cellulitis.  Patient is given strict ER precautions if symptoms do not improve or worsens or if patient develops fever.  Patient given signs and symptoms of sepsis.  Patient verbalized understanding.    Additional MDM:     Heart Failure Score:   COPD = No

## 2024-03-07 ENCOUNTER — PATIENT MESSAGE (OUTPATIENT)
Dept: OPTOMETRY | Facility: CLINIC | Age: 49
End: 2024-03-07
Payer: COMMERCIAL

## 2024-04-30 ENCOUNTER — PATIENT MESSAGE (OUTPATIENT)
Dept: FAMILY MEDICINE | Facility: CLINIC | Age: 49
End: 2024-04-30
Payer: COMMERCIAL

## 2024-04-30 DIAGNOSIS — Z12.5 SCREENING FOR PROSTATE CANCER: ICD-10-CM

## 2024-04-30 DIAGNOSIS — Z00.00 ANNUAL PHYSICAL EXAM: Primary | ICD-10-CM

## 2024-05-20 ENCOUNTER — OFFICE VISIT (OUTPATIENT)
Dept: FAMILY MEDICINE | Facility: CLINIC | Age: 49
End: 2024-05-20
Attending: FAMILY MEDICINE
Payer: COMMERCIAL

## 2024-05-20 VITALS
HEART RATE: 65 BPM | BODY MASS INDEX: 28.45 KG/M2 | OXYGEN SATURATION: 95 % | SYSTOLIC BLOOD PRESSURE: 127 MMHG | DIASTOLIC BLOOD PRESSURE: 81 MMHG | WEIGHT: 196 LBS

## 2024-05-20 DIAGNOSIS — Z00.00 ANNUAL PHYSICAL EXAM: Primary | ICD-10-CM

## 2024-05-20 PROCEDURE — 3074F SYST BP LT 130 MM HG: CPT | Mod: CPTII,S$GLB,, | Performed by: FAMILY MEDICINE

## 2024-05-20 PROCEDURE — 99999 PR PBB SHADOW E&M-EST. PATIENT-LVL III: CPT | Mod: PBBFAC,,, | Performed by: FAMILY MEDICINE

## 2024-05-20 PROCEDURE — 99396 PREV VISIT EST AGE 40-64: CPT | Mod: S$GLB,,, | Performed by: FAMILY MEDICINE

## 2024-05-20 PROCEDURE — 3008F BODY MASS INDEX DOCD: CPT | Mod: CPTII,S$GLB,, | Performed by: FAMILY MEDICINE

## 2024-05-20 PROCEDURE — 3079F DIAST BP 80-89 MM HG: CPT | Mod: CPTII,S$GLB,, | Performed by: FAMILY MEDICINE

## 2024-05-20 PROCEDURE — 1159F MED LIST DOCD IN RCRD: CPT | Mod: CPTII,S$GLB,, | Performed by: FAMILY MEDICINE

## 2024-05-20 NOTE — PROGRESS NOTES
Subjective:       Patient ID: Gautam Ross is a 49 y.o. male.    Chief Complaint: Annual Exam    HPI  Pt is here for annual exam pt is well no sob/cp no cough chest congestion no sore throat uri symptoms  Pt denies dysuria hematuria no acute urinary complaints  Pt denies n/v/f/c/d/c no change in bowel habits no brbpr    Review of Systems   Constitutional:  Negative for activity change, chills, fatigue and fever.   HENT:  Negative for congestion, ear pain, hearing loss, postnasal drip, rhinorrhea, sinus pressure and sore throat.    Eyes:  Negative for photophobia, pain, redness and visual disturbance.   Respiratory:  Negative for cough, chest tightness and shortness of breath.    Cardiovascular:  Negative for chest pain, palpitations and leg swelling.   Gastrointestinal:  Negative for abdominal pain, blood in stool, constipation, diarrhea, nausea and vomiting.   Genitourinary:  Negative for decreased urine volume, difficulty urinating, dysuria, frequency, penile discharge and urgency.   Musculoskeletal:  Negative for back pain, joint swelling and neck pain.   Skin:  Negative for color change, pallor and rash.   Neurological:  Negative for dizziness, seizures, speech difficulty and numbness.   Hematological:  Does not bruise/bleed easily.   Psychiatric/Behavioral:  Negative for behavioral problems, confusion, decreased concentration and suicidal ideas.        Objective:    /81   Pulse 65   Wt 88.9 kg (196 lb)   SpO2 95%   BMI 28.45 kg/m²     Physical Exam  Constitutional:       General: He is not in acute distress.     Appearance: Normal appearance. He is well-developed. He is not ill-appearing.   HENT:      Head: Normocephalic and atraumatic.      Right Ear: Tympanic membrane normal.      Left Ear: Tympanic membrane normal.      Nose: Nose normal.   Eyes:      Pupils: Pupils are equal, round, and reactive to light.   Neck:      Thyroid: No thyromegaly.   Cardiovascular:      Rate and Rhythm:  "Normal rate and regular rhythm.      Heart sounds: Normal heart sounds. No murmur heard.     No friction rub. No gallop.   Pulmonary:      Effort: Pulmonary effort is normal. No respiratory distress.      Breath sounds: Normal breath sounds.   Abdominal:      General: Bowel sounds are normal. There is no distension.      Palpations: Abdomen is soft.      Tenderness: There is no abdominal tenderness. There is no guarding or rebound.   Genitourinary:     Comments: declined  Musculoskeletal:         General: No tenderness. Normal range of motion.      Cervical back: Normal range of motion and neck supple.   Skin:     General: Skin is warm and dry.      Findings: No erythema.   Neurological:      General: No focal deficit present.      Mental Status: He is alert and oriented to person, place, and time.      Cranial Nerves: No cranial nerve deficit.      Coordination: Coordination normal.   Psychiatric:         Mood and Affect: Mood normal.         Behavior: Behavior normal.         Thought Content: Thought content normal.         Judgment: Judgment normal.         Assessment:       1. Annual physical exam        Plan:     Orders cbc cmp lipid tsh urine hgb A1c  Cont meds  Low fat diet  Graded exercise  Rtc annually    Health maintenance  Discussed with pt        "This note will not be shared with the patient."     "

## 2024-05-21 ENCOUNTER — LAB VISIT (OUTPATIENT)
Dept: LAB | Facility: HOSPITAL | Age: 49
End: 2024-05-21
Attending: FAMILY MEDICINE
Payer: COMMERCIAL

## 2024-05-21 ENCOUNTER — PATIENT MESSAGE (OUTPATIENT)
Dept: FAMILY MEDICINE | Facility: CLINIC | Age: 49
End: 2024-05-21
Payer: COMMERCIAL

## 2024-05-21 DIAGNOSIS — Z00.00 ANNUAL PHYSICAL EXAM: Primary | ICD-10-CM

## 2024-05-21 DIAGNOSIS — Z12.5 SCREENING FOR PROSTATE CANCER: ICD-10-CM

## 2024-05-21 DIAGNOSIS — Z00.00 ANNUAL PHYSICAL EXAM: ICD-10-CM

## 2024-05-21 LAB
ALBUMIN SERPL BCP-MCNC: 3.9 G/DL (ref 3.5–5.2)
ALP SERPL-CCNC: 99 U/L (ref 55–135)
ALT SERPL W/O P-5'-P-CCNC: 24 U/L (ref 10–44)
ANION GAP SERPL CALC-SCNC: 9 MMOL/L (ref 8–16)
AST SERPL-CCNC: 22 U/L (ref 10–40)
BASOPHILS # BLD AUTO: 0.03 K/UL (ref 0–0.2)
BASOPHILS NFR BLD: 0.6 % (ref 0–1.9)
BILIRUB SERPL-MCNC: 0.5 MG/DL (ref 0.1–1)
BUN SERPL-MCNC: 15 MG/DL (ref 6–20)
CALCIUM SERPL-MCNC: 9.4 MG/DL (ref 8.7–10.5)
CHLORIDE SERPL-SCNC: 106 MMOL/L (ref 95–110)
CHOLEST SERPL-MCNC: 173 MG/DL (ref 120–199)
CHOLEST/HDLC SERPL: 4.3 {RATIO} (ref 2–5)
CO2 SERPL-SCNC: 24 MMOL/L (ref 23–29)
COMPLEXED PSA SERPL-MCNC: 0.9 NG/ML (ref 0–4)
CREAT SERPL-MCNC: 1 MG/DL (ref 0.5–1.4)
DIFFERENTIAL METHOD BLD: NORMAL
EOSINOPHIL # BLD AUTO: 0.2 K/UL (ref 0–0.5)
EOSINOPHIL NFR BLD: 4 % (ref 0–8)
ERYTHROCYTE [DISTWIDTH] IN BLOOD BY AUTOMATED COUNT: 13.4 % (ref 11.5–14.5)
EST. GFR  (NO RACE VARIABLE): >60 ML/MIN/1.73 M^2
ESTIMATED AVG GLUCOSE: 97 MG/DL (ref 68–131)
GLUCOSE SERPL-MCNC: 96 MG/DL (ref 70–110)
HBA1C MFR BLD: 5 % (ref 4–5.6)
HCT VFR BLD AUTO: 44.1 % (ref 40–54)
HDLC SERPL-MCNC: 40 MG/DL (ref 40–75)
HDLC SERPL: 23.1 % (ref 20–50)
HGB BLD-MCNC: 14.7 G/DL (ref 14–18)
IMM GRANULOCYTES # BLD AUTO: 0.01 K/UL (ref 0–0.04)
IMM GRANULOCYTES NFR BLD AUTO: 0.2 % (ref 0–0.5)
LDLC SERPL CALC-MCNC: 101.6 MG/DL (ref 63–159)
LYMPHOCYTES # BLD AUTO: 2 K/UL (ref 1–4.8)
LYMPHOCYTES NFR BLD: 37 % (ref 18–48)
MCH RBC QN AUTO: 28.3 PG (ref 27–31)
MCHC RBC AUTO-ENTMCNC: 33.3 G/DL (ref 32–36)
MCV RBC AUTO: 85 FL (ref 82–98)
MONOCYTES # BLD AUTO: 0.6 K/UL (ref 0.3–1)
MONOCYTES NFR BLD: 10.4 % (ref 4–15)
NEUTROPHILS # BLD AUTO: 2.5 K/UL (ref 1.8–7.7)
NEUTROPHILS NFR BLD: 47.8 % (ref 38–73)
NONHDLC SERPL-MCNC: 133 MG/DL
NRBC BLD-RTO: 0 /100 WBC
PLATELET # BLD AUTO: 190 K/UL (ref 150–450)
PMV BLD AUTO: 11.2 FL (ref 9.2–12.9)
POTASSIUM SERPL-SCNC: 4.2 MMOL/L (ref 3.5–5.1)
PROT SERPL-MCNC: 7.1 G/DL (ref 6–8.4)
RBC # BLD AUTO: 5.2 M/UL (ref 4.6–6.2)
SODIUM SERPL-SCNC: 139 MMOL/L (ref 136–145)
TRIGL SERPL-MCNC: 157 MG/DL (ref 30–150)
TSH SERPL DL<=0.005 MIU/L-ACNC: 1.83 UIU/ML (ref 0.4–4)
WBC # BLD AUTO: 5.27 K/UL (ref 3.9–12.7)

## 2024-05-21 PROCEDURE — 36415 COLL VENOUS BLD VENIPUNCTURE: CPT | Mod: PO | Performed by: FAMILY MEDICINE

## 2024-05-21 PROCEDURE — 85025 COMPLETE CBC W/AUTO DIFF WBC: CPT | Performed by: FAMILY MEDICINE

## 2024-05-21 PROCEDURE — 84443 ASSAY THYROID STIM HORMONE: CPT | Performed by: FAMILY MEDICINE

## 2024-05-21 PROCEDURE — 84153 ASSAY OF PSA TOTAL: CPT | Performed by: FAMILY MEDICINE

## 2024-05-21 PROCEDURE — 83036 HEMOGLOBIN GLYCOSYLATED A1C: CPT | Performed by: FAMILY MEDICINE

## 2024-05-21 PROCEDURE — 80061 LIPID PANEL: CPT | Performed by: FAMILY MEDICINE

## 2024-05-21 PROCEDURE — 80053 COMPREHEN METABOLIC PANEL: CPT | Performed by: FAMILY MEDICINE

## 2024-05-22 ENCOUNTER — LAB VISIT (OUTPATIENT)
Dept: LAB | Facility: HOSPITAL | Age: 49
End: 2024-05-22
Attending: FAMILY MEDICINE
Payer: COMMERCIAL

## 2024-05-22 DIAGNOSIS — Z00.00 ANNUAL PHYSICAL EXAM: ICD-10-CM

## 2024-05-22 LAB
BILIRUB UR QL STRIP: NEGATIVE
CLARITY UR REFRACT.AUTO: CLEAR
COLOR UR AUTO: YELLOW
GLUCOSE UR QL STRIP: NEGATIVE
HGB UR QL STRIP: NEGATIVE
KETONES UR QL STRIP: NEGATIVE
LEUKOCYTE ESTERASE UR QL STRIP: NEGATIVE
NITRITE UR QL STRIP: NEGATIVE
PH UR STRIP: 6 [PH] (ref 5–8)
PROT UR QL STRIP: NEGATIVE
SP GR UR STRIP: 1.02 (ref 1–1.03)
URN SPEC COLLECT METH UR: NORMAL

## 2024-05-22 PROCEDURE — 81003 URINALYSIS AUTO W/O SCOPE: CPT | Performed by: FAMILY MEDICINE

## 2024-07-08 ENCOUNTER — OFFICE VISIT (OUTPATIENT)
Dept: OPTOMETRY | Facility: CLINIC | Age: 49
End: 2024-07-08
Payer: COMMERCIAL

## 2024-07-08 DIAGNOSIS — Z46.0 FITTING AND ADJUSTMENT OF SPECTACLES AND CONTACT LENSES: Primary | ICD-10-CM

## 2024-07-08 DIAGNOSIS — Z97.3 WEARS CONTACT LENSES: ICD-10-CM

## 2024-07-08 DIAGNOSIS — H52.4 MYOPIA OF BOTH EYES WITH ASTIGMATISM AND PRESBYOPIA: Primary | ICD-10-CM

## 2024-07-08 DIAGNOSIS — H52.203 MYOPIA OF BOTH EYES WITH ASTIGMATISM AND PRESBYOPIA: Primary | ICD-10-CM

## 2024-07-08 DIAGNOSIS — Z98.890 HX OF LASIK: ICD-10-CM

## 2024-07-08 DIAGNOSIS — H52.13 MYOPIA OF BOTH EYES WITH ASTIGMATISM AND PRESBYOPIA: Primary | ICD-10-CM

## 2024-07-08 PROCEDURE — 92014 COMPRE OPH EXAM EST PT 1/>: CPT | Mod: S$GLB,,, | Performed by: OPTOMETRIST

## 2024-07-08 PROCEDURE — 92015 DETERMINE REFRACTIVE STATE: CPT | Mod: S$GLB,,, | Performed by: OPTOMETRIST

## 2024-07-08 PROCEDURE — 99999 PR PBB SHADOW E&M-EST. PATIENT-LVL II: CPT | Mod: PBBFAC,,, | Performed by: OPTOMETRIST

## 2024-07-08 PROCEDURE — 92310 CONTACT LENS FITTING OU: CPT | Mod: CSM,,, | Performed by: OPTOMETRIST

## 2024-07-08 NOTE — PROGRESS NOTES
HPI    48 Y/o male is here for routine eye exam with no C/o about ocular health   Pt denies pain and discomfort   No f/f    Eye med: [facility] PRN   Last edited by Ezio Cabrales MA on 7/8/2024  3:10 PM.            Assessment /Plan     For exam results, see Encounter Report.    Myopia of both eyes with astigmatism and presbyopia    Hx of LASIK    Wears contact lenses      1. Residual myopia/astig/presby sp LASIK 15 years ago. Good fit w  OASYS 1-Day, but needs minor axis change OS.  Wears otc readers when needed    PLAN:    Wrote spex/CLRx  Continue Daily Wear schedule.  NO SLEEPING IN CONTACT LENSES. Exchange daily  Rtc 1 yr

## 2024-07-10 ENCOUNTER — PATIENT MESSAGE (OUTPATIENT)
Dept: OPTOMETRY | Facility: CLINIC | Age: 49
End: 2024-07-10
Payer: COMMERCIAL

## 2024-07-15 ENCOUNTER — CLINICAL SUPPORT (OUTPATIENT)
Dept: ENDOSCOPY | Facility: HOSPITAL | Age: 49
End: 2024-07-15
Attending: FAMILY MEDICINE
Payer: COMMERCIAL

## 2024-07-15 ENCOUNTER — TELEPHONE (OUTPATIENT)
Dept: ENDOSCOPY | Facility: HOSPITAL | Age: 49
End: 2024-07-15

## 2024-07-15 DIAGNOSIS — Z00.00 ANNUAL PHYSICAL EXAM: ICD-10-CM

## 2024-07-15 NOTE — TELEPHONE ENCOUNTER
Contacted patient to schedule colonoscopy. Spoke with his wife Anais. No slots available at this time. New PAT appt scheduled. Understanding verbalized.

## 2024-10-16 ENCOUNTER — CLINICAL SUPPORT (OUTPATIENT)
Dept: ENDOSCOPY | Facility: HOSPITAL | Age: 49
End: 2024-10-16
Attending: FAMILY MEDICINE
Payer: COMMERCIAL

## 2024-10-16 ENCOUNTER — TELEPHONE (OUTPATIENT)
Dept: ENDOSCOPY | Facility: HOSPITAL | Age: 49
End: 2024-10-16

## 2024-10-16 VITALS — BODY MASS INDEX: 28.06 KG/M2 | WEIGHT: 196 LBS | HEIGHT: 70 IN

## 2024-10-16 DIAGNOSIS — Z00.00 ANNUAL PHYSICAL EXAM: ICD-10-CM

## 2024-10-16 DIAGNOSIS — Z12.11 SPECIAL SCREENING FOR MALIGNANT NEOPLASMS, COLON: Primary | ICD-10-CM

## 2024-10-16 RX ORDER — SODIUM, POTASSIUM,MAG SULFATES 17.5-3.13G
1 SOLUTION, RECONSTITUTED, ORAL ORAL DAILY
Qty: 1 KIT | Refills: 0 | Status: SHIPPED | OUTPATIENT
Start: 2024-10-16 | End: 2024-10-18

## 2024-10-16 NOTE — TELEPHONE ENCOUNTER
Spoke to patient to schedule procedure(s) Colonoscopy       Physician to perform procedure(s) Dr. NARESH Hodge  Date of Procedure (s) 11/9/24  Arrival Time 8:30 AM  Time of Procedure(s) 9:30 AM   Location of Procedure(s) [facility]  Type of Rx Prep sent to patient: Suprep  Instructions provided to patient via [facility]    Patient was informed on the following information and verbalized understanding. Screening questionnaire reviewed with patient and complete. If procedure requires anesthesia, a responsible adult needs to be present to accompany the patient home, patient cannot drive after receiving anesthesia. Appointment details are tentative, especially check-in time. Patient will receive a prep-op call 7 days prior to confirm check-in time for procedure. If applicable the patient should contact their pharmacy to verify Rx for procedure prep is ready for pick-up. Patient was advised to call the scheduling department at [phone] if pharmacy states no Rx is available. Patient was advised to call the endoscopy scheduling department if any questions or concerns arise.      [facility] Endoscopy Scheduling Department

## 2024-11-04 ENCOUNTER — TELEPHONE (OUTPATIENT)
Dept: ENDOSCOPY | Facility: HOSPITAL | Age: 49
End: 2024-11-04
Payer: COMMERCIAL

## 2024-11-04 DIAGNOSIS — Z12.11 SCREEN FOR COLON CANCER: Primary | ICD-10-CM

## 2024-11-04 RX ORDER — SODIUM, POTASSIUM,MAG SULFATES 17.5-3.13G
1 SOLUTION, RECONSTITUTED, ORAL ORAL DAILY
Qty: 1 KIT | Refills: 0 | Status: SHIPPED | OUTPATIENT
Start: 2024-11-04 | End: 2024-11-06

## 2024-11-04 NOTE — TELEPHONE ENCOUNTER
Rescheduled, original referral for procedure from PAT appointment    Spoke to patient to reschedule Colonoscopy       Physician to perform procedure(s) Dr.P. Byrd  Date of Procedure (s) 12/27/24  Arrival Time 12:30 PM  Time of Procedure(s) 1:30 PM   Location of Procedure(s) [facility]  Type of Rx Prep sent to patient's pharmacy: Suprep  Instructions provided to patient via [facility]  Patient denies use of blood thinners, GLP-1 medications, and weight loss medications.  The following information was discussed with patient, and patient verbalized understanding:  Screening questionnaire reviewed with patient and complete. If procedure requires anesthesia, a responsible adult needs to be present to accompany the patient home. Appointment details are tentative, especially check-in time. Patient will receive a pre-op call 7 days prior to appointment to confirm check-in time for procedure. If applicable the patient should contact their pharmacy to verify Rx for procedure prep is ready for pick-up. Patient was instructed to call the scheduling department at [phone] if pharmacy states no Rx is available. Patient was also advised to call the endoscopy scheduling department if any questions or concerns arise.       Endoscopy Scheduling Department

## 2024-12-04 ENCOUNTER — TELEPHONE (OUTPATIENT)
Dept: ENDOSCOPY | Facility: HOSPITAL | Age: 49
End: 2024-12-04
Payer: COMMERCIAL

## 2024-12-04 NOTE — TELEPHONE ENCOUNTER
Luciana Esquivel Terri, RN  Caller: [phone] (Yesterday,  2:45 PM)         Previous Messages       ----- Message -----  From: Ernestina Marley MA  Sent: 12/3/2024   3:09 PM CST  To: [facility]  Subject: pt wants to r/s his upcoming proc on 12/27        ----- Message -----  From: Lynne Morejon  Sent: 12/3/2024   2:50 PM CST  To: [facility]  Subject: change date of procedure                        Pt's wife ( Anais )  called regarding changing his date of his procedure which is currently scheduled for 12/27/24       Please call back to speak with wife ( Anais ) at. [phone]    [facility] with wife with call back number

## 2024-12-10 ENCOUNTER — TELEPHONE (OUTPATIENT)
Dept: ENDOSCOPY | Facility: HOSPITAL | Age: 49
End: 2024-12-10
Payer: COMMERCIAL

## 2024-12-11 ENCOUNTER — TELEPHONE (OUTPATIENT)
Dept: ENDOSCOPY | Facility: HOSPITAL | Age: 49
End: 2024-12-11
Payer: COMMERCIAL

## 2024-12-11 NOTE — TELEPHONE ENCOUNTER
Luciana Esquivel Terri RN  Caller: [phone] (1 week ago)         Previous Messages       ----- Message -----  From: Ernestina Marley MA  Sent: 12/3/2024   3:09 PM CST  To: [facility] Patients  Subject: pt wants to r/s his upcoming proc on 12/27        ----- Message -----  From: Lynne Morejon  Sent: 12/3/2024   2:50 PM CST  To: [facility] Staff  Subject: change date of procedure                        Pt's wife ( Anais )  called regarding changing his date of his procedure which is currently scheduled for 12/27/24       Please call back to speak with wife ( Anais ) at. [phone]    Spoke with wife. She decided to keep pt procedure as is.central pricing number provided.

## 2024-12-18 ENCOUNTER — OFFICE VISIT (OUTPATIENT)
Dept: DERMATOLOGY | Facility: CLINIC | Age: 49
End: 2024-12-18
Payer: COMMERCIAL

## 2024-12-18 DIAGNOSIS — L73.9 FOLLICULITIS: Primary | ICD-10-CM

## 2024-12-18 PROCEDURE — 99999 PR PBB SHADOW E&M-EST. PATIENT-LVL III: CPT | Mod: PBBFAC,,, | Performed by: PHYSICIAN ASSISTANT

## 2024-12-18 PROCEDURE — 3044F HG A1C LEVEL LT 7.0%: CPT | Mod: CPTII,S$GLB,, | Performed by: PHYSICIAN ASSISTANT

## 2024-12-18 PROCEDURE — 87077 CULTURE AEROBIC IDENTIFY: CPT | Performed by: PHYSICIAN ASSISTANT

## 2024-12-18 PROCEDURE — 87186 SC STD MICRODIL/AGAR DIL: CPT | Performed by: PHYSICIAN ASSISTANT

## 2024-12-18 PROCEDURE — G2211 COMPLEX E/M VISIT ADD ON: HCPCS | Mod: S$GLB,,, | Performed by: PHYSICIAN ASSISTANT

## 2024-12-18 PROCEDURE — 1159F MED LIST DOCD IN RCRD: CPT | Mod: CPTII,S$GLB,, | Performed by: PHYSICIAN ASSISTANT

## 2024-12-18 PROCEDURE — 1160F RVW MEDS BY RX/DR IN RCRD: CPT | Mod: CPTII,S$GLB,, | Performed by: PHYSICIAN ASSISTANT

## 2024-12-18 PROCEDURE — 87070 CULTURE OTHR SPECIMN AEROBIC: CPT | Performed by: PHYSICIAN ASSISTANT

## 2024-12-18 PROCEDURE — 99204 OFFICE O/P NEW MOD 45 MIN: CPT | Mod: S$GLB,,, | Performed by: PHYSICIAN ASSISTANT

## 2024-12-18 RX ORDER — CLINDAMYCIN PHOSPHATE 10 MG/G
GEL TOPICAL 2 TIMES DAILY
Qty: 30 G | Refills: 2 | Status: SHIPPED | OUTPATIENT
Start: 2024-12-18

## 2024-12-18 NOTE — PATIENT INSTRUCTIONS
Folliculitis  -     Aerobic culture  -     clindamycin phosphate 1% (CLINDAGEL) 1 % gel; Apply topically 2 (two) times daily.  Dispense: 30 g; Refill: 2    Wash with BP wash sample nightly and apply clindagel BID for 7 days.    Await culture results.    Discussed going to ER if he experiences emergent symptoms.          Follow up in about 3 weeks (around 1/8/2025).

## 2024-12-18 NOTE — PROGRESS NOTES
Subjective:      Patient ID:  Gautam Ross is a 49 y.o. male who presents for   Chief Complaint   Patient presents with    Folliculitis     Right arm     Pt is present for folliculitis. He reports that he got a new tattoo and a week later he had bumps on his right arm and a few on his back. He reports that the appearance of the bumps happened a second time, and he reports noticing that it happened after he shaved his arms which he does about 1x a week. It did not occur on the contralateral arm.     Patient with new area of concern:   Location: right arm  Duration: couple weeks   S/S: none  Previous treatments: dial soap, hydrocortisone      Review of Systems   Constitutional:  Negative for fever and chills.   Skin:  Negative for itching and rash.       Objective:   Physical Exam   Constitutional: He appears well-developed and well-nourished. No distress.   Neurological: He is alert and oriented to person, place, and time. He is not disoriented.   Psychiatric: He has a normal mood and affect.   Skin:   Areas Examined (abnormalities noted in diagram):   Back Inspection Performed  RUE Inspected                      Diagram Legend     Erythematous scaling macule/papule c/w actinic keratosis       Vascular papule c/w angioma      Pigmented verrucoid papule/plaque c/w seborrheic keratosis      Yellow umbilicated papule c/w sebaceous hyperplasia      Irregularly shaped tan macule c/w lentigo     1-2 mm smooth white papules consistent with Milia      Movable subcutaneous cyst with punctum c/w epidermal inclusion cyst      Subcutaneous movable cyst c/w pilar cyst      Firm pink to brown papule c/w dermatofibroma      Pedunculated fleshy papule(s) c/w skin tag(s)      Evenly pigmented macule c/w junctional nevus     Mildly variegated pigmented, slightly irregular-bordered macule c/w mildly atypical nevus      Flesh colored to evenly pigmented papule c/w intradermal nevus       Pink pearly papule/plaque c/w basal  cell carcinoma      Erythematous hyperkeratotic cursted plaque c/w SCC      Surgical scar with no sign of skin cancer recurrence      Open and closed comedones      Inflammatory papules and pustules      Verrucoid papule consistent consistent with wart     Erythematous eczematous patches and plaques     Dystrophic onycholytic nail with subungual debris c/w onychomycosis     Umbilicated papule    Erythematous-base heme-crusted tan verrucoid plaque consistent with inflamed seborrheic keratosis     Erythematous Silvery Scaling Plaque c/w Psoriasis     See annotation      Assessment / Plan:        Folliculitis  -     Aerobic culture  -     clindamycin phosphate 1% (CLINDAGEL) 1 % gel; Apply topically 2 (two) times daily.  Dispense: 30 g; Refill: 2    Wash with BP wash sample nightly and apply clindagel BID for 7 days.    Await culture results.    Discussed going to ER if he experiences emergent symptoms.          Follow up in about 3 weeks (around 1/8/2025).

## 2024-12-21 ENCOUNTER — HOSPITAL ENCOUNTER (EMERGENCY)
Facility: HOSPITAL | Age: 49
Discharge: HOME | End: 2024-12-21
Attending: STUDENT IN AN ORGANIZED HEALTH CARE EDUCATION/TRAINING PROGRAM
Payer: COMMERCIAL

## 2024-12-21 VITALS
SYSTOLIC BLOOD PRESSURE: 105 MMHG | DIASTOLIC BLOOD PRESSURE: 70 MMHG | HEART RATE: 77 BPM | HEIGHT: 70 IN | OXYGEN SATURATION: 95 % | BODY MASS INDEX: 28.06 KG/M2 | RESPIRATION RATE: 20 BRPM | TEMPERATURE: 98 F | WEIGHT: 196 LBS

## 2024-12-21 DIAGNOSIS — R59.9 ENLARGED LYMPH NODE: ICD-10-CM

## 2024-12-21 DIAGNOSIS — N20.0 KIDNEY STONE: Primary | ICD-10-CM

## 2024-12-21 DIAGNOSIS — M79.89 SOFT TISSUE CALCIFICATION: ICD-10-CM

## 2024-12-21 LAB
ALBUMIN SERPL BCP-MCNC: 4.1 G/DL (ref 3.5–5.2)
ALP SERPL-CCNC: 102 U/L (ref 40–150)
ALT SERPL W/O P-5'-P-CCNC: 25 U/L (ref 10–44)
ANION GAP SERPL CALC-SCNC: 10 MMOL/L (ref 8–16)
AST SERPL-CCNC: 16 U/L (ref 10–40)
BACTERIA #/AREA URNS AUTO: ABNORMAL /HPF
BACTERIA SPEC AEROBE CULT: ABNORMAL
BASOPHILS # BLD AUTO: 0.06 K/UL (ref 0–0.2)
BASOPHILS NFR BLD: 0.6 % (ref 0–1.9)
BILIRUB SERPL-MCNC: 0.6 MG/DL (ref 0.1–1)
BILIRUB UR QL STRIP: NEGATIVE
BUN SERPL-MCNC: 17 MG/DL (ref 6–20)
CALCIUM SERPL-MCNC: 9.6 MG/DL (ref 8.7–10.5)
CHLORIDE SERPL-SCNC: 105 MMOL/L (ref 95–110)
CLARITY UR REFRACT.AUTO: ABNORMAL
CO2 SERPL-SCNC: 24 MMOL/L (ref 23–29)
COLOR UR AUTO: ABNORMAL
CREAT SERPL-MCNC: 1.1 MG/DL (ref 0.5–1.4)
DIFFERENTIAL METHOD BLD: ABNORMAL
EOSINOPHIL # BLD AUTO: 0.1 K/UL (ref 0–0.5)
EOSINOPHIL NFR BLD: 0.8 % (ref 0–8)
ERYTHROCYTE [DISTWIDTH] IN BLOOD BY AUTOMATED COUNT: 13.2 % (ref 11.5–14.5)
EST. GFR  (NO RACE VARIABLE): >60 ML/MIN/1.73 M^2
GLUCOSE SERPL-MCNC: 128 MG/DL (ref 70–110)
GLUCOSE UR QL STRIP: NEGATIVE
HCT VFR BLD AUTO: 45.8 % (ref 40–54)
HGB BLD-MCNC: 16.3 G/DL (ref 14–18)
HGB UR QL STRIP: ABNORMAL
HYALINE CASTS UR QL AUTO: 0 /LPF
IMM GRANULOCYTES # BLD AUTO: 0.03 K/UL (ref 0–0.04)
IMM GRANULOCYTES NFR BLD AUTO: 0.3 % (ref 0–0.5)
KETONES UR QL STRIP: NEGATIVE
LEUKOCYTE ESTERASE UR QL STRIP: NEGATIVE
LIPASE SERPL-CCNC: 13 U/L (ref 4–60)
LYMPHOCYTES # BLD AUTO: 1 K/UL (ref 1–4.8)
LYMPHOCYTES NFR BLD: 10.9 % (ref 18–48)
MCH RBC QN AUTO: 29 PG (ref 27–31)
MCHC RBC AUTO-ENTMCNC: 35.6 G/DL (ref 32–36)
MCV RBC AUTO: 82 FL (ref 82–98)
MICROSCOPIC COMMENT: ABNORMAL
MONOCYTES # BLD AUTO: 0.3 K/UL (ref 0.3–1)
MONOCYTES NFR BLD: 2.8 % (ref 4–15)
NEUTROPHILS # BLD AUTO: 8 K/UL (ref 1.8–7.7)
NEUTROPHILS NFR BLD: 84.6 % (ref 38–73)
NITRITE UR QL STRIP: NEGATIVE
NRBC BLD-RTO: 0 /100 WBC
PH UR STRIP: 6 [PH] (ref 5–8)
PLATELET # BLD AUTO: 205 K/UL (ref 150–450)
PMV BLD AUTO: 11.1 FL (ref 9.2–12.9)
POTASSIUM SERPL-SCNC: 4 MMOL/L (ref 3.5–5.1)
PROT SERPL-MCNC: 7.9 G/DL (ref 6–8.4)
PROT UR QL STRIP: ABNORMAL
RBC # BLD AUTO: 5.62 M/UL (ref 4.6–6.2)
RBC #/AREA URNS AUTO: >100 /HPF (ref 0–4)
SODIUM SERPL-SCNC: 139 MMOL/L (ref 136–145)
SP GR UR STRIP: 1.03 (ref 1–1.03)
URN SPEC COLLECT METH UR: ABNORMAL
WBC # BLD AUTO: 9.44 K/UL (ref 3.9–12.7)
WBC #/AREA URNS AUTO: 0 /HPF (ref 0–5)
YEAST UR QL AUTO: ABNORMAL

## 2024-12-21 PROCEDURE — 83690 ASSAY OF LIPASE: CPT | Performed by: STUDENT IN AN ORGANIZED HEALTH CARE EDUCATION/TRAINING PROGRAM

## 2024-12-21 PROCEDURE — 25000003 PHARM REV CODE 250: Performed by: STUDENT IN AN ORGANIZED HEALTH CARE EDUCATION/TRAINING PROGRAM

## 2024-12-21 PROCEDURE — 81001 URINALYSIS AUTO W/SCOPE: CPT | Performed by: STUDENT IN AN ORGANIZED HEALTH CARE EDUCATION/TRAINING PROGRAM

## 2024-12-21 PROCEDURE — 85025 COMPLETE CBC W/AUTO DIFF WBC: CPT | Performed by: STUDENT IN AN ORGANIZED HEALTH CARE EDUCATION/TRAINING PROGRAM

## 2024-12-21 PROCEDURE — 99285 EMERGENCY DEPT VISIT HI MDM: CPT | Mod: 25

## 2024-12-21 PROCEDURE — 63600175 PHARM REV CODE 636 W HCPCS: Performed by: STUDENT IN AN ORGANIZED HEALTH CARE EDUCATION/TRAINING PROGRAM

## 2024-12-21 PROCEDURE — 96361 HYDRATE IV INFUSION ADD-ON: CPT

## 2024-12-21 PROCEDURE — 80053 COMPREHEN METABOLIC PANEL: CPT | Performed by: STUDENT IN AN ORGANIZED HEALTH CARE EDUCATION/TRAINING PROGRAM

## 2024-12-21 PROCEDURE — 96374 THER/PROPH/DIAG INJ IV PUSH: CPT

## 2024-12-21 RX ORDER — HYDROCODONE BITARTRATE AND ACETAMINOPHEN 5; 325 MG/1; MG/1
1 TABLET ORAL EVERY 6 HOURS PRN
Qty: 12 TABLET | Refills: 0 | Status: SHIPPED | OUTPATIENT
Start: 2024-12-21

## 2024-12-21 RX ORDER — KETOROLAC TROMETHAMINE 30 MG/ML
15 INJECTION, SOLUTION INTRAMUSCULAR; INTRAVENOUS
Status: COMPLETED | OUTPATIENT
Start: 2024-12-21 | End: 2024-12-21

## 2024-12-21 RX ORDER — TAMSULOSIN HYDROCHLORIDE 0.4 MG/1
0.4 CAPSULE ORAL DAILY
Qty: 10 CAPSULE | Refills: 0 | Status: SHIPPED | OUTPATIENT
Start: 2024-12-21 | End: 2024-12-31

## 2024-12-21 RX ORDER — FLUCONAZOLE 150 MG/1
150 TABLET ORAL
Status: COMPLETED | OUTPATIENT
Start: 2024-12-21 | End: 2024-12-21

## 2024-12-21 RX ORDER — ONDANSETRON 4 MG/1
4 TABLET, ORALLY DISINTEGRATING ORAL EVERY 8 HOURS PRN
Qty: 12 TABLET | Refills: 0 | Status: SHIPPED | OUTPATIENT
Start: 2024-12-21

## 2024-12-21 RX ADMIN — FLUCONAZOLE 150 MG: 150 TABLET ORAL at 11:12

## 2024-12-21 RX ADMIN — SODIUM CHLORIDE 500 ML: 9 INJECTION, SOLUTION INTRAVENOUS at 08:12

## 2024-12-21 RX ADMIN — KETOROLAC TROMETHAMINE 15 MG: 30 INJECTION, SOLUTION INTRAMUSCULAR; INTRAVENOUS at 08:12

## 2024-12-22 ENCOUNTER — PATIENT MESSAGE (OUTPATIENT)
Dept: FAMILY MEDICINE | Facility: CLINIC | Age: 49
End: 2024-12-22
Payer: COMMERCIAL

## 2024-12-22 ENCOUNTER — PATIENT MESSAGE (OUTPATIENT)
Dept: UROLOGY | Facility: CLINIC | Age: 49
End: 2024-12-22
Payer: COMMERCIAL

## 2024-12-22 ENCOUNTER — HOSPITAL ENCOUNTER (EMERGENCY)
Facility: HOSPITAL | Age: 49
Discharge: HOME | End: 2024-12-22
Attending: STUDENT IN AN ORGANIZED HEALTH CARE EDUCATION/TRAINING PROGRAM
Payer: COMMERCIAL

## 2024-12-22 VITALS
OXYGEN SATURATION: 96 % | SYSTOLIC BLOOD PRESSURE: 108 MMHG | BODY MASS INDEX: 28.06 KG/M2 | WEIGHT: 196 LBS | HEIGHT: 70 IN | RESPIRATION RATE: 16 BRPM | DIASTOLIC BLOOD PRESSURE: 70 MMHG | HEART RATE: 75 BPM | TEMPERATURE: 98 F

## 2024-12-22 DIAGNOSIS — R10.9 FLANK PAIN: Primary | ICD-10-CM

## 2024-12-22 DIAGNOSIS — N20.0 KIDNEY STONE: ICD-10-CM

## 2024-12-22 PROCEDURE — 99284 EMERGENCY DEPT VISIT MOD MDM: CPT | Mod: 25

## 2024-12-22 PROCEDURE — 63600175 PHARM REV CODE 636 W HCPCS

## 2024-12-22 PROCEDURE — 96376 TX/PRO/DX INJ SAME DRUG ADON: CPT

## 2024-12-22 PROCEDURE — 96374 THER/PROPH/DIAG INJ IV PUSH: CPT

## 2024-12-22 PROCEDURE — 96361 HYDRATE IV INFUSION ADD-ON: CPT

## 2024-12-22 PROCEDURE — 96375 TX/PRO/DX INJ NEW DRUG ADDON: CPT

## 2024-12-22 RX ORDER — MORPHINE SULFATE 4 MG/ML
4 INJECTION, SOLUTION INTRAMUSCULAR; INTRAVENOUS
Status: COMPLETED | OUTPATIENT
Start: 2024-12-22 | End: 2024-12-22

## 2024-12-22 RX ORDER — ONDANSETRON HYDROCHLORIDE 2 MG/ML
8 INJECTION, SOLUTION INTRAVENOUS
Status: DISCONTINUED | OUTPATIENT
Start: 2024-12-22 | End: 2024-12-22

## 2024-12-22 RX ORDER — ONDANSETRON HYDROCHLORIDE 2 MG/ML
4 INJECTION, SOLUTION INTRAVENOUS
Status: COMPLETED | OUTPATIENT
Start: 2024-12-22 | End: 2024-12-22

## 2024-12-22 RX ORDER — KETOROLAC TROMETHAMINE 30 MG/ML
15 INJECTION, SOLUTION INTRAMUSCULAR; INTRAVENOUS
Status: COMPLETED | OUTPATIENT
Start: 2024-12-22 | End: 2024-12-22

## 2024-12-22 RX ORDER — KETOROLAC TROMETHAMINE 30 MG/ML
15 INJECTION, SOLUTION INTRAMUSCULAR; INTRAVENOUS
Status: DISCONTINUED | OUTPATIENT
Start: 2024-12-22 | End: 2024-12-22

## 2024-12-22 RX ADMIN — MORPHINE SULFATE 4 MG: 4 INJECTION INTRAVENOUS at 03:12

## 2024-12-22 RX ADMIN — MORPHINE SULFATE 4 MG: 4 INJECTION INTRAVENOUS at 06:12

## 2024-12-22 RX ADMIN — MORPHINE SULFATE 4 MG: 4 INJECTION INTRAVENOUS at 02:12

## 2024-12-22 RX ADMIN — ONDANSETRON 4 MG: 2 INJECTION INTRAMUSCULAR; INTRAVENOUS at 02:12

## 2024-12-22 RX ADMIN — SODIUM CHLORIDE, POTASSIUM CHLORIDE, SODIUM LACTATE AND CALCIUM CHLORIDE 1000 ML: 600; 310; 30; 20 INJECTION, SOLUTION INTRAVENOUS at 02:12

## 2024-12-22 RX ADMIN — KETOROLAC TROMETHAMINE 15 MG: 30 INJECTION, SOLUTION INTRAMUSCULAR; INTRAVENOUS at 03:12

## 2024-12-22 NOTE — DISCHARGE INSTRUCTIONS
You are being discharged from the emergency department at this time.  The cause of your pain is likely the kidney stone moving down your ureter there are reassured at this time through pain controlled we can safely discharge you.  We have given you several doses of pain medication and you can go  your prescriptions at 11:00 a.m. when your pharmacy opens to further manage her symptoms.

## 2024-12-22 NOTE — ED PROVIDER NOTES
Encounter Date: 12/22/2024       History     Chief Complaint   Patient presents with    Flank Pain     Right side;+vomiting/nausea; just d/c'd at midnight; has kidney stone; 10/10     Patient is a 49-year-old male who presents today with flank pain.  Past medical history of appendicitis, an intra-abdominal abscess, was just discharged at midnight or a renal stone diagnosed on CT.  He was discharged 2 hours prior for a diagnosed renal stone.  Since returning home he vomited 4 times and was not able to keep down either the Zofran which he took 1 hour ago and the oxycodone which he took 1 hour and 30 minutes to the emergency department with flank pain, nausea, and vomiting.  Denies any allergies to medications.        Review of patient's allergies indicates:  No Known Allergies  Past Medical History:   Diagnosis Date    Acute appendicitis 5/13/2018    Gallbladder attack 11/15/2018    Pain in right shoulder 9/16/2019    Postprocedural intraabdominal abscess 5/24/2018     Past Surgical History:   Procedure Laterality Date    APPENDECTOMY      EXTRACORPOREAL SHOCK WAVE LITHOTRIPSY Right 10/23/2020    Procedure: LITHOTRIPSY, ESWL;  Surgeon: Kirill Graves MD;  Location: [address];  Service: Urology;  Laterality: Right;  1.5 hours     EXTRACORPOREAL SHOCK WAVE LITHOTRIPSY Left 8/2/2023    Procedure: LITHOTRIPSY, ESWL;  Surgeon: Kirill Graves MD;  Location: [address];  Service: Urology;  Laterality: Left;    INTRAVENOUS PYELOGRAM N/A 10/23/2020    Procedure: PYELOGRAM, INTRAVENOUS;  Surgeon: Kirill Graves MD;  Location: [address];  Service: Urology;  Laterality: N/A;    LAPAROSCOPIC CHOLECYSTECTOMY N/A 11/15/2018    Procedure: CHOLECYSTECTOMY, LAPAROSCOPIC;  Surgeon: Korey Keller Jr., MD;  Location: [facility];  Service: General;  Laterality: N/A;    LASIK Bilateral     UMBILICAL HERNIA REPAIR  10/16/13    open repair without mesh for 1cm defect; Dr. Rosales, [facility]     Family History   Problem Relation  Name Age of Onset    Arthritis Mother      Cancer Father      Diabetes Father      Cataracts Father      Allergies Son          food allergies    Amblyopia Neg Hx      Blindness Neg Hx      Glaucoma Neg Hx      Macular degeneration Neg Hx      Retinal detachment Neg Hx      Strabismus Neg Hx       Social History     Tobacco Use    Smoking status: Former     Current packs/day: 0.00     Types: Cigarettes     Quit date: 2002     Years since quittin.3     Passive exposure: Past    Smokeless tobacco: Never   Substance Use Topics    Alcohol use: No    Drug use: No     Review of Systems    Physical Exam     Initial Vitals [24 0222]   BP Pulse Resp Temp SpO2   125/88 67 20 98.4 °F (36.9 °C) 100 %      MAP       --         Physical Exam    Constitutional: He appears well-developed and well-nourished. He appears distressed.   Cardiovascular:  Normal rate, regular rhythm and normal heart sounds.     Exam reveals no friction rub.       No murmur heard.  Pulmonary/Chest: Breath sounds normal. No respiratory distress. He has no wheezes.   Genitourinary:    Genitourinary Comments: + right CVA tenderness             ED Course   Procedures  Labs Reviewed   HEPATITIS C ANTIBODY   HIV 1 / 2 ANTIBODY          Imaging Results    None          Medications   morphine injection 4 mg (has no administration in time range)   ondansetron injection 4 mg (4 mg Intravenous Given 24 0250)   morphine injection 4 mg (4 mg Intravenous Given 24 0250)   lactated ringers bolus 1,000 mL (0 mLs Intravenous Stopped 24)   ketorolac injection 15 mg (15 mg Intravenous Given 24 0358)   morphine injection 4 mg (4 mg Intravenous Given 24 0359)     Medical Decision Making  Patient is a 49-year-old male who presents today with flank pain. He was discharged 2 hours prior for a diagnosed renal stone. Now has nausea and vomiting as well as increased 10/10 pain to the right flank. Was not able to hold down either  zofran or oxycodone. , 4 of I have the morphine, and IV Zofran at this time to control his symptoms.  The stone is 4.5 mm in width is likely moving throughout his ureter at this time. Managed with multiple rounds of morphine, ketorolac, and zofran. His pain is better controlled at this time, he has medications sent to his pharmacy which opens at 11 am. He is okay leaving and picking up his prescriptions then, at this time safe for discharge.      Risk  Prescription drug management.                                      Clinical Impression:  Final diagnoses:  [R10.9] Flank pain (Primary)  [N20.0] Kidney stone          ED Disposition Condition    Discharge Stable          ED Prescriptions    None       Follow-up Information       Follow up With Specialties Details Why Contact Info    Nan Esquivel MD Family Medicine  As needed [address] [facility] [facility]  [address]  [facility] [address]  [phone]      [facility] - Emergency Dept Emergency Medicine  If symptoms worsen [address] [address] [phone]             Joaquin Adkins MD  Resident  12/22/24 [id number]

## 2024-12-22 NOTE — DISCHARGE INSTRUCTIONS
Follow-up with your urologist for the kidney stone. Use the strainer. Take medications as directed. Return for any new or worsening symptoms.  For the enlarged lymph nodes and soft tissue calcification follow-up with your primary care doctor.

## 2024-12-22 NOTE — ED PROVIDER NOTES
Encounter Date: 12/21/2024       History     Chief Complaint   Patient presents with    Flank Pain     R flank pain onset today. Hx of kidney stones in the past. Denies dysuria but endorses hematuria.      HPI    50 y/o M PMH kidney stones who presents to the ED for evaluation of flank pain.  Patient notes acute onset of right sided flank pain earlier today. Associated with nausea, no vomiting. Similar pain in the past with prior renal stones.  He has required lithotripsy in the past as well.  Hx of appendectomy and cholecystectomy.  Denies any f/c, CP, dyspnea, vomiting, trauma, or dysuria.  He notes likely some blood in the urine as well.      Review of patient's allergies indicates:  No Known Allergies  Past Medical History:   Diagnosis Date    Acute appendicitis 5/13/2018    Gallbladder attack 11/15/2018    Pain in right shoulder 9/16/2019    Postprocedural intraabdominal abscess 5/24/2018     Past Surgical History:   Procedure Laterality Date    APPENDECTOMY      EXTRACORPOREAL SHOCK WAVE LITHOTRIPSY Right 10/23/2020    Procedure: LITHOTRIPSY, ESWL;  Surgeon: Kirill Graves MD;  Location: [address];  Service: Urology;  Laterality: Right;  1.5 hours     EXTRACORPOREAL SHOCK WAVE LITHOTRIPSY Left 8/2/2023    Procedure: LITHOTRIPSY, ESWL;  Surgeon: Kirill Graves MD;  Location: [address];  Service: Urology;  Laterality: Left;    INTRAVENOUS PYELOGRAM N/A 10/23/2020    Procedure: PYELOGRAM, INTRAVENOUS;  Surgeon: Kirill Graves MD;  Location: [address];  Service: Urology;  Laterality: N/A;    LAPAROSCOPIC CHOLECYSTECTOMY N/A 11/15/2018    Procedure: CHOLECYSTECTOMY, LAPAROSCOPIC;  Surgeon: Korey Keller Jr., MD;  Location: [facility];  Service: General;  Laterality: N/A;    LASIK Bilateral     UMBILICAL HERNIA REPAIR  10/16/13    open repair without mesh for 1cm defect; Dr. Rosales, [facility]     Family History   Problem Relation Name Age of Onset    Arthritis Mother      Cancer Father      Diabetes  Father      Cataracts Father      Allergies Son          food allergies    Amblyopia Neg Hx      Blindness Neg Hx      Glaucoma Neg Hx      Macular degeneration Neg Hx      Retinal detachment Neg Hx      Strabismus Neg Hx       Social History     Tobacco Use    Smoking status: Former     Current packs/day: 0.00     Types: Cigarettes     Quit date: 2002     Years since quittin.3     Passive exposure: Past    Smokeless tobacco: Never   Substance Use Topics    Alcohol use: No    Drug use: No     Review of Systems   Genitourinary:  Positive for flank pain.       Physical Exam     Initial Vitals [24 1806]   BP Pulse Resp Temp SpO2   133/76 68 20 98.8 °F (37.1 °C) 96 %      MAP       --         Physical Exam    Nursing note and vitals reviewed.  Constitutional: He appears well-nourished.   Eyes: EOM are normal.   Neck: Neck supple.   Cardiovascular:  Normal rate and regular rhythm.           Pulmonary/Chest: Breath sounds normal. No respiratory distress.   Abdominal:   Mild left flank TTP   Musculoskeletal:         General: No edema. Normal range of motion.      Cervical back: Neck supple.     Neurological: He is alert and oriented to person, place, and time.   Skin: Skin is warm and dry.   Psychiatric: He has a normal mood and affect. Thought content normal.         ED Course   Procedures  Labs Reviewed   CBC W/ AUTO DIFFERENTIAL - Abnormal       Result Value    WBC 9.44      RBC 5.62      Hemoglobin 16.3      Hematocrit 45.8      MCV 82      MCH 29.0      MCHC 35.6      RDW 13.2      Platelets 205      MPV 11.1      Immature Granulocytes 0.3      Gran # (ANC) 8.0 (*)     Immature Grans (Abs) 0.03      Lymph # 1.0      Mono # 0.3      Eos # 0.1      Baso # 0.06      nRBC 0      Gran % 84.6 (*)     Lymph % 10.9 (*)     Mono % 2.8 (*)     Eosinophil % 0.8      Basophil % 0.6      Differential Method Automated     COMPREHENSIVE METABOLIC PANEL - Abnormal    Sodium 139      Potassium 4.0      Chloride 105       CO2 24      Glucose 128 (*)     BUN 17      Creatinine 1.1      Calcium 9.6      Total Protein 7.9      Albumin 4.1      Total Bilirubin 0.6      Alkaline Phosphatase 102      AST 16      ALT 25      eGFR >60.0      Anion Gap 10     URINALYSIS, REFLEX TO URINE CULTURE - Abnormal    Specimen UA Urine, Clean Catch      Color, UA Orange (*)     Appearance, UA Hazy (*)     pH, UA 6.0      Specific Gravity, UA 1.030      Protein, UA 1+ (*)     Glucose, UA Negative      Ketones, UA Negative      Bilirubin (UA) Negative      Occult Blood UA 3+ (*)     Nitrite, UA Negative      Leukocytes, UA Negative      Narrative:     Specimen Source->Urine   URINALYSIS MICROSCOPIC - Abnormal    RBC, UA >100 (*)     WBC, UA 0      Bacteria None      Yeast, UA Many (*)     Hyaline Casts, UA 0      Microscopic Comment SEE COMMENT      Narrative:     Specimen Source->Urine   LIPASE    Lipase 13            Imaging Results               CT Renal Stone Study ABD Pelvis WO (Final result)  Result time 12/21/24 22:28:59      Final result by Garrison Reeves MD (12/21/24 22:28:59)                   Impression:      Mid right ureteral calculus with mild hydroureteronephrosis, as discussed above.    Haziness of the central abdominal mesentery with multiple small lymph nodes, nonspecific, may relate to a baseline appearance, may be reactive, can be seen with mesenteric panniculitis or a lymphoproliferative process.    Soft tissue finding of the right abdomen in a retrocecal position with associated calcification, present on the prior study although mildly more prominent at this time, short-term follow-up to document stability is recommended.    Diverticula of the colon are noted, there is no evidence for acute diverticulitis.    Additional findings as above.    This report was flagged in Epic as abnormal.      Electronically signed by: Garrison Reeves  Date:    12/21/2024  Time:    22:28               Narrative:    EXAMINATION:  CT RENAL STONE  STUDY ABD PELVIS WO    CLINICAL HISTORY:  Nephrolithiasis, uncomplicated;    TECHNIQUE:  Low dose axial images, sagittal and coronal reformations were obtained from the lung bases to the pubic symphysis.  Contrast was not administered.    COMPARISON:  CT examination abdomen and pelvis October 5, 2022    FINDINGS:  As best seen on axial image 106 at the mid right ureter there is a calculus measuring approximately 3 mm on axial imaging and measuring 4.7 mm on coronal reconstruction imaging.  There is associated mild right hydroureteronephrosis.  There is no evidence for left-sided ureteral calculus or obstructive uropathy.  The urinary bladder appears unremarkable for degree of distention.    There is bandlike opacity seen at the left lung base, likely representing atelectatic change, the lung bases demonstrate mild motion artifact and mild atelectatic change.  There is mild elevation of the left hemidiaphragm.    The gallbladder is surgically absent.  When accounting for limitations of the exam there is no evidence for acute process of the stomach, liver, pancreas, spleen or adrenal glands.  Small noninflamed duodenal diverticulum is noted.  The abdominal aorta appears normal in caliber, otherwise not optimally evaluated on this noncontrast examination.    Central abdominal mesenteric haziness with mildly prominent lymph nodes noted, this appears similar to the prior study and may relate to a baseline appearance, may be reactive, can be seen with mesenteric panniculitis or a lymphoproliferative process.    There is no evidence for small bowel obstructive process.  The patient is status post appendectomy.  There are diverticula of the colon noted, there is no evidence for acute diverticulitis.  There is no evidence for inflammatory or obstructive process of the colon.    As seen on axial image 96 there is an oval soft tissue finding within the right abdomen posterior to the colon measuring approximately 19.3 x 12.2 mm  in size, with an associated calcification.  Etiology is uncertain, this was present on the prior study although is mildly more prominent, given the interval change I would recommend short-term follow-up to document stability.    There is no evidence for free intraperitoneal air and no abnormal free fluid of the abdomen or pelvis.    The visualized osseous structures appear intact.  Chronic change noted.                                       Medications   ketorolac injection 15 mg (15 mg Intravenous Given 12/21/24 2016)   sodium chloride 0.9% bolus 500 mL 500 mL (0 mLs Intravenous Stopped 12/21/24 2117)   fluconazole tablet 150 mg (150 mg Oral Given 12/21/24 2349)     Medical Decision Making  Amount and/or Complexity of Data Reviewed  Labs: ordered. Decision-making details documented in ED Course.  Radiology: ordered and independent interpretation performed. Decision-making details documented in ED Course.    Risk  Prescription drug management.                                    49-year-old male here with flank pain.    Vital signs stable in emergency room, mild flank pain on exam.    History of kidney stones similar to prior.    No leukocytosis, electrolytes are normal, creatinine is stable.  UA with blood and yeast, no bacteria.    He was given IV Toradol and fluids, on re-evaluation he expressed feeling much better.    One time dose of fluconazole for yeast in urine.  CT abdomen and pelvis showing an acute 3 mm stone with mild hydro.  Incidental findings of enlarged lymph nodes, and soft tissue calcification in the abdomen.  I discussed these with the patient in detail, he will need outpatient follow up for this with his primary care doctor, he expressed understanding.  We will discharge home with close urology follow up with strainer, pain meds, Flomax, and Zofran.  He is comfortable with plan, follow up with Urology and primary as directed, strict return precautions were given, he expressed understanding,  discharged home.      Clinical Impression:  Final diagnoses:  [N20.0] Kidney stone (Primary)  [R59.9] Enlarged lymph node  [M79.89] Soft tissue calcification          ED Disposition Condition    Discharge Stable          ED Prescriptions       Medication Sig Dispense Start Date End Date Auth. Provider    HYDROcodone-acetaminophen (NORCO) 5-325 mg per tablet Take 1 tablet by mouth every 6 (six) hours as needed for Pain. 12 tablet 12/21/2024 -- aSdi De Dios MD    ondansetron (ZOFRAN-ODT) 4 MG TbDL Take 1 tablet (4 mg total) by mouth every 8 (eight) hours as needed (Nausea). 12 tablet 12/21/2024 -- Sadi De Dios MD    tamsulosin (FLOMAX) 0.4 mg Cap Take 1 capsule (0.4 mg total) by mouth once daily. for 10 days 10 capsule 12/21/2024 12/31/2024 Sadi De Dios MD          Follow-up Information       Follow up With Specialties Details Why Contact [facility] - Emergency Dept Emergency Medicine  As needed [facility] [facility] [id number] [phone]    Kirill Graves MD Urology Schedule an appointment as soon as possible for a visit   [facility]  [place] [address]  [phone]               Sadi De Dios MD  12/22/24 0041

## 2024-12-22 NOTE — ED TRIAGE NOTES
"Gautam Ross, a 49 y.o. male presents to the ED w/ complaint of "kidney stone." Pt has hx of kidney stones. Reports right flank pain started today. Pt describes the pain now as circumferential, worse on left than right, and intermittent. Pt endorses nausea. Pt states pain radiates around back and abdomen. Pt denies burning with urination, endorses blood in urine. Pt reports chills but denies fevers. Pt is AAOx4, GCS 15.     Triage note:  Chief Complaint   Patient presents with    Flank Pain     R flank pain onset today. Hx of kidney stones in the past. Denies dysuria but endorses hematuria.      Review of patient's allergies indicates:  No Known Allergies  Past Medical History:   Diagnosis Date    Acute appendicitis 5/13/2018    Gallbladder attack 11/15/2018    Pain in right shoulder 9/16/2019    Postprocedural intraabdominal abscess 5/24/2018       "

## 2024-12-22 NOTE — ED TRIAGE NOTES
Gautam Ross, a 49 y.o. male presents to the ED w/ complaint of right flank pain. Pt just discharged. Pt reports pain came back at 10/10 and he became nauseous and vomitted. No new complaints, unable to  prescription due to pharmacy being closed. Pt is AAOx4, GCS 15.     Triage note:  Chief Complaint   Patient presents with    Flank Pain     Right side;+vomiting/nausea; just d/c'd at midnight; has kidney stone; 10/10     Review of patient's allergies indicates:  No Known Allergies  Past Medical History:   Diagnosis Date    Acute appendicitis 5/13/2018    Gallbladder attack 11/15/2018    Pain in right shoulder 9/16/2019    Postprocedural intraabdominal abscess 5/24/2018

## 2024-12-23 ENCOUNTER — OFFICE VISIT (OUTPATIENT)
Dept: UROLOGY | Facility: CLINIC | Age: 49
End: 2024-12-23
Payer: COMMERCIAL

## 2024-12-23 ENCOUNTER — TELEPHONE (OUTPATIENT)
Dept: UROLOGY | Facility: CLINIC | Age: 49
End: 2024-12-23
Payer: COMMERCIAL

## 2024-12-23 ENCOUNTER — TELEPHONE (OUTPATIENT)
Dept: ENDOSCOPY | Facility: HOSPITAL | Age: 49
End: 2024-12-23
Payer: COMMERCIAL

## 2024-12-23 ENCOUNTER — TELEPHONE (OUTPATIENT)
Dept: DERMATOLOGY | Facility: CLINIC | Age: 49
End: 2024-12-23
Payer: COMMERCIAL

## 2024-12-23 ENCOUNTER — ANESTHESIA EVENT (OUTPATIENT)
Dept: SURGERY | Facility: HOSPITAL | Age: 49
End: 2024-12-23
Payer: COMMERCIAL

## 2024-12-23 ENCOUNTER — HOSPITAL ENCOUNTER (OUTPATIENT)
Dept: RADIOLOGY | Facility: HOSPITAL | Age: 49
Discharge: HOME | End: 2024-12-23
Attending: UROLOGY
Payer: COMMERCIAL

## 2024-12-23 VITALS
BODY MASS INDEX: 30.04 KG/M2 | WEIGHT: 198.19 LBS | SYSTOLIC BLOOD PRESSURE: 126 MMHG | DIASTOLIC BLOOD PRESSURE: 79 MMHG | HEIGHT: 68 IN | HEART RATE: 69 BPM

## 2024-12-23 DIAGNOSIS — N13.2 HYDRONEPHROSIS WITH URINARY OBSTRUCTION DUE TO URETERAL CALCULUS: ICD-10-CM

## 2024-12-23 DIAGNOSIS — N13.30 HYDRONEPHROSIS, RIGHT: ICD-10-CM

## 2024-12-23 DIAGNOSIS — N39.0 ACUTE UTI: Primary | ICD-10-CM

## 2024-12-23 DIAGNOSIS — N20.0 RECURRENT KIDNEY STONES: ICD-10-CM

## 2024-12-23 DIAGNOSIS — N23 RENAL COLIC ON RIGHT SIDE: ICD-10-CM

## 2024-12-23 DIAGNOSIS — N20.0 KIDNEY STONE ON LEFT SIDE: Primary | ICD-10-CM

## 2024-12-23 DIAGNOSIS — N20.1 URETERAL STONE: Primary | ICD-10-CM

## 2024-12-23 DIAGNOSIS — N20.1 URETERAL STONE: ICD-10-CM

## 2024-12-23 PROCEDURE — 3074F SYST BP LT 130 MM HG: CPT | Mod: CPTII,S$GLB,, | Performed by: UROLOGY

## 2024-12-23 PROCEDURE — 3008F BODY MASS INDEX DOCD: CPT | Mod: CPTII,S$GLB,, | Performed by: UROLOGY

## 2024-12-23 PROCEDURE — 99999 PR PBB SHADOW E&M-EST. PATIENT-LVL III: CPT | Mod: PBBFAC,,, | Performed by: UROLOGY

## 2024-12-23 PROCEDURE — 87086 URINE CULTURE/COLONY COUNT: CPT | Performed by: UROLOGY

## 2024-12-23 PROCEDURE — 74018 RADEX ABDOMEN 1 VIEW: CPT | Mod: TC

## 2024-12-23 PROCEDURE — 3078F DIAST BP <80 MM HG: CPT | Mod: CPTII,S$GLB,, | Performed by: UROLOGY

## 2024-12-23 PROCEDURE — 3044F HG A1C LEVEL LT 7.0%: CPT | Mod: CPTII,S$GLB,, | Performed by: UROLOGY

## 2024-12-23 PROCEDURE — 99215 OFFICE O/P EST HI 40 MIN: CPT | Mod: 57,S$GLB,, | Performed by: UROLOGY

## 2024-12-23 PROCEDURE — 74018 RADEX ABDOMEN 1 VIEW: CPT | Mod: 26,,, | Performed by: RADIOLOGY

## 2024-12-23 RX ORDER — SULFAMETHOXAZOLE AND TRIMETHOPRIM 800; 160 MG/1; MG/1
1 TABLET ORAL 2 TIMES DAILY
Qty: 14 TABLET | Refills: 0 | Status: SHIPPED | OUTPATIENT
Start: 2024-12-23 | End: 2024-12-30

## 2024-12-23 RX ORDER — KETOROLAC TROMETHAMINE 10 MG/1
10 TABLET, FILM COATED ORAL EVERY 6 HOURS
Qty: 12 TABLET | Refills: 0 | Status: SHIPPED | OUTPATIENT
Start: 2024-12-23 | End: 2024-12-26

## 2024-12-23 RX ORDER — TAMSULOSIN HYDROCHLORIDE 0.4 MG/1
0.4 CAPSULE ORAL NIGHTLY
Qty: 30 CAPSULE | Refills: 11 | Status: SHIPPED | OUTPATIENT
Start: 2024-12-23

## 2024-12-23 NOTE — TELEPHONE ENCOUNTER
garrett is a 49-year-old male who presents today with flank pain. He was discharged 2 hours prior for a diagnosed renal stone. Now has nausea and vomiting as well as increased 10/10 pain to the right flank. Was not able to hold down either zofran or oxycodone. , 4 of I have the morphine, and IV Zofran at this time to control his symptoms. The stone is 4.5 mm in width is likely moving throughout his ureter at this time. Managed with multiple rounds of morphine, ketorolac, and zofran. His pain is better controlled at this time, he has medications sent to his pharmacy which opens at 11 am. He is okay leaving and picking up his prescriptions then, at this time safe for discharge.     CT RSS 12/21/24  Mid right ureteral calculus with mild hydroureteronephrosis, as discussed above.     Haziness of the central abdominal mesentery with multiple small lymph nodes, nonspecific, may relate to a baseline appearance, may be reactive, can be seen with mesenteric panniculitis or a lymphoproliferative process.     Soft tissue finding of the right abdomen in a retrocecal position with associated calcification, present on the prior study although mildly more prominent at this time, short-term follow-up to document stability is recommended.     Diverticula of the colon are noted, there is no evidence for acute diverticulitis.    Ureteral stone  -     X-Ray Abdomen AP 1 View; Future; Expected date: 12/23/2024    Hydronephrosis, right  -     X-Ray Abdomen AP 1 View; Future; Expected date: 12/23/2024    Renal colic on right side  -     X-Ray Abdomen AP 1 View; Future; Expected date: 12/23/2024       I called the pt this morning.  Please have him come at 1 pm today with KUB.  I may have to add him on for surgery tomorrow.

## 2024-12-23 NOTE — PROGRESS NOTES
CC: right renal colic, ER follow up    Gautam Ross is a 49 y.o. man who is here for the evaluation of recurrent kidney stone.  Patient is a 49-year-old male who went tot the ER on 12/21/24 with flank pain. He was discharged 2 hours prior for a diagnosed renal stone. Now has nausea and vomiting as well as increased 10/10 pain to the right flank. Was not able to hold down either zofran or oxycodone. , 4 of I have the morphine, and IV Zofran at this time to control his symptoms. The stone is 4.5 mm in width is likely moving throughout his ureter at this time. Managed with multiple rounds of morphine, ketorolac, and zofran. His pain is better controlled at this time, he has medications sent to his pharmacy which opens at 11 am. He is okay leaving and picking up his prescriptions then, at this time safe for discharge.      CT RSS 12/21/24  Mid right ureteral calculus with mild hydroureteronephrosis, as discussed above.   Haziness of the central abdominal mesentery with multiple small lymph nodes, nonspecific, may relate to a baseline appearance, may be reactive, can be seen with mesenteric panniculitis or a lymphoproliferative process.   Soft tissue finding of the right abdomen in a retrocecal position with associated calcification, present on the prior study although mildly more prominent at this time, short-term follow-up to document stability is recommended.   Diverticula of the colon are noted, there is no evidence for acute diverticulitis.    Since his ER visit, he has done better.  His pain has been under control.  No fever or chills reported.    Has a Hx of a  left renal stone presenting for definitive stone management. Potential UPJ obstruction/narrowing discussed, but patient wanted to proceed with the risks of the stone not passing.   The stone is radio-opaque on KUB.    Date: 08/02/2023  Pre-Op Diagnosis: Left renal stone  Post-Op Diagnosis: same  Procedure(s) Performed:   1.  Left  ESWL  Findings:   -Left lower pole stone completely fragmented and not visible at the end of the case      His family members had many episodes of recurrent kidney stones.  His father and older brother had at least more than 20 stones in the past.  His older brother started to take Calcium supplement and stopped having recurrent stones.    He is a .      Past Medical History:   Diagnosis Date    Acute appendicitis 2018    Gallbladder attack 11/15/2018    Pain in right shoulder 2019    Postprocedural intraabdominal abscess 2018     Past Surgical History:   Procedure Laterality Date    APPENDECTOMY      EXTRACORPOREAL SHOCK WAVE LITHOTRIPSY Right 10/23/2020    Procedure: LITHOTRIPSY, ESWL;  Surgeon: Kirill Graves MD;  Location: [facility] OR [address] [place] [address];  Service: Urology;  Laterality: Right;  1.5 hours     EXTRACORPOREAL SHOCK WAVE LITHOTRIPSY Left 2023    Procedure: LITHOTRIPSY, ESWL;  Surgeon: Kirill Graves MD;  [facility];  Service: Urology;  Laterality: Left;    INTRAVENOUS PYELOGRAM N/A 10/23/2020    Procedure: PYELOGRAM, INTRAVENOUS;  Surgeon: Kirill Graves MD;  [facility];  Service: Urology;  Laterality: N/A;    LAPAROSCOPIC CHOLECYSTECTOMY N/A 11/15/2018    Procedure: CHOLECYSTECTOMY, LAPAROSCOPIC;  Surgeon: Korey Keller Jr., MD;  Location: [facility];  Service: General;  Laterality: N/A;    LASIK Bilateral     UMBILICAL HERNIA REPAIR  10/16/13    open repair without mesh for 1cm defect; [place]. [place]ales[place] [facility] [place]     Social History     Tobacco Use    Smoking status: Former     Current packs/day: 0.00     Types: Cigarettes     Quit date: 2002     Years since quittin.3     Passive exposure: Past    Smokeless tobacco: Never   Substance Use Topics    Alcohol use: No    Drug use: No     Family History   Problem Relation Name Age of Onset    Arthritis Mother      Cancer Father      Diabetes Father      Cataracts Father      Allergies Son          food allergies     Amblyopia Neg Hx      Blindness Neg Hx      Glaucoma Neg Hx      Macular degeneration Neg Hx      Retinal detachment Neg Hx      Strabismus Neg Hx       Allergy:  Review of patient's allergies indicates:  No Known Allergies  Outpatient Encounter Medications as of 12/23/2024   Medication Sig Dispense Refill    clindamycin phosphate 1% (CLINDAGEL) 1 % gel Apply topically 2 (two) times daily. 30 g 2    HYDROcodone-acetaminophen (NORCO) 5-325 mg per tablet Take 1 tablet by mouth every 6 (six) hours as needed for Pain. 12 tablet 0    ketorolac (TORADOL) 10 mg tablet Take 1 tablet (10 mg total) by mouth every 6 (six) hours. for 3 days 12 tablet 0    ondansetron (ZOFRAN-ODT) 4 MG TbDL Take 1 tablet (4 mg total) by mouth every 8 (eight) hours as needed (Nausea). 12 tablet 0    sulfamethoxazole-trimethoprim 800-160mg (BACTRIM DS) 800-160 mg Tab Take 1 tablet by mouth 2 (two) times daily. for 7 days 14 tablet 0    tamsulosin (FLOMAX) 0.4 mg Cap Take 1 capsule (0.4 mg total) by mouth once daily. for 10 days 10 capsule 0    tamsulosin (FLOMAX) 0.4 mg Cap Take 1 capsule (0.4 mg total) by mouth every evening. 30 capsule 11     No facility-administered encounter medications on file as of 12/23/2024.     Review of Systems   ROS  Physical Exam     Vitals:    12/23/24 1248   BP: 126/79   Pulse: 69         Physical Exam  Constitutional:       General: He is not in acute distress.     Appearance: He is well-developed. He is not diaphoretic.   HENT:      Head: Normocephalic and atraumatic.      Right Ear: External ear normal.      Left Ear: External ear normal.      Nose: Nose normal.   Eyes:      Conjunctiva/sclera: Conjunctivae normal.      Pupils: Pupils are equal, round, and reactive to light.   Neck:      Thyroid: No thyromegaly.      Vascular: No JVD.      Trachea: No tracheal deviation.   Cardiovascular:      Rate and Rhythm: Normal rate and regular rhythm.      Heart sounds: Normal heart sounds. No murmur heard.     No friction  rub. No gallop.   Pulmonary:      Effort: Pulmonary effort is normal. No respiratory distress.      Breath sounds: Normal breath sounds. No wheezing.   Chest:      Chest wall: No tenderness.   Abdominal:      General: Bowel sounds are normal. There is no distension.      Palpations: Abdomen is soft. There is no mass.      Tenderness: There is no abdominal tenderness. There is no guarding or rebound.   Genitourinary:     Penis: Normal. No tenderness.    Musculoskeletal:         General: No tenderness or deformity. Normal range of motion.      Cervical back: Normal range of motion and neck supple.   Lymphadenopathy:      Cervical: No cervical adenopathy.   Skin:     General: Skin is warm and dry.   Neurological:      Mental Status: He is alert and oriented to person, place, and time.   Psychiatric:         Behavior: Behavior normal.         Thought Content: Thought content normal.         LABS:  Lab Results   Component Value Date    PSA 0.90 05/21/2024    PSA 0.89 09/08/2022     No results found for this or any previous visit.  Lab Results   Component Value Date    CREATININE 1.1 12/21/2024    CREATININE 1.0 05/21/2024    CREATININE 1.1 10/05/2022     No results found for this or any previous visit.  Urine Culture, Routine   Date Value Ref Range Status   10/15/2020 No growth  Final     Hemoglobin A1C   Date Value Ref Range Status   05/21/2024 5.0 4.0 - 5.6 % Final     Comment:     ADA Screening Guidelines:  5.7-6.4%  Consistent with prediabetes  >or=6.5%  Consistent with diabetes    High levels of fetal hemoglobin interfere with the HbA1C  assay. Heterozygous hemoglobin variants (HbS, HgC, etc)do  not significantly interfere with this assay.   However, presence of multiple variants may affect accuracy.       Urine culture 12/18/24         Abnormal   STAPHYLOCOCCUS AUREUS  Few  Susceptibility pending           Radiology:  CT RSS 12/21/24  Mid right ureteral calculus with mild hydroureteronephrosis, as discussed above.    Haziness of the central abdominal mesentery with multiple small lymph nodes, nonspecific, may relate to a baseline appearance, may be reactive, can be seen with mesenteric panniculitis or a lymphoproliferative process.   Soft tissue finding of the right abdomen in a retrocecal position with associated calcification, present on the prior study although mildly more prominent at this time, short-term follow-up to document stability is recommended.   Diverticula of the colon are noted, there is no evidence for acute diverticulitis.    KUB today 12/23/24  Suggestion of faintly visualized calcification at the L5 level on the right side overlying the iliac wing. This could represent a previously described ureteral stone. No other definite a urinary tract calcifications. No bowel dilatation identified.       US 6/14/23  10 mm nonobstructive left renal calculus new since the abdominal ultrasound of June 24, 2022.  KUB 6//14/23  Postop cholecystectomy.  1 cm size vague calcific density right lateral flank stable.  Otherwise no urinary tract stones confirmed.    KUB 8/28/23  No suspicious calcifications are seen.    CT RSS 10/5/22  1. Left renal lower pole new 3 mm nonobstructing calculus.    Assessment and Plan:  Gautam was seen today for flank pain.    Diagnoses and all orders for this visit:    Acute UTI  -     sulfamethoxazole-trimethoprim 800-160mg (BACTRIM DS) 800-160 mg Tab; Take 1 tablet by mouth 2 (two) times daily. for 7 days  -     CULTURE, URINE    Renal colic on right side  -     ketorolac (TORADOL) 10 mg tablet; Take 1 tablet (10 mg total) by mouth every 6 (six) hours. for 3 days  -     tamsulosin (FLOMAX) 0.4 mg Cap; Take 1 capsule (0.4 mg total) by mouth every evening.    Hydronephrosis with urinary obstruction due to ureteral calculus  -     tamsulosin (FLOMAX) 0.4 mg Cap; Take 1 capsule (0.4 mg total) by mouth every evening.    Recurrent kidney stones  -     Kidney Stone Urine Panel One, 24-hour collection;  Future  -     Kidney Stone Urine Panel One, 24-hour collection        S/p left ESWL.  No stone seen on KUB.  Pt did not save any stone fragment.    His brother took Calcium supplement to prevent his stone disease (Raw Calcium Vitamin Code)  He tried Kidney  (over-the-counter supplement) and it did not help him for recurrent stone.  So will try Raw Calcium supplement and will repeat Litholink.    Drink plenty of water to maintain 2 liters of UO a day.  High amount of citric acid intake recommended.    I reviewed his Litholink study obtained on 10/24/22.  High calciumn 349 ( normal less than 250) and high Na 2888 ( normal 50 to 150) noted.  Normal volume ( greater than 2 liters/ day) and normal citric acid 883 ( normal greater than 450) noted.  Recommend HTCZ 25 mg once a day and will titrate up to 25 mg BID or 50 mg once a day.  Low salt diet recommended to keep his urine NA less than 150 if he can.  Take banana every day in order to avoid potassium loss.    I spent 40 minutes with the patient of which more than half was spent in direct consultation with the patient in regards to our treatment and plan.       Follow-up:  Follow up in about 1 day (around 12/24/2024), or cysto right ureteral stent placement, poss. right URS with stone removal.

## 2024-12-23 NOTE — PROGRESS NOTES
garrett is a 49-year-old male who presents today with flank pain. He was discharged 2 hours prior for a diagnosed renal stone. Now has nausea and vomiting as well as increased 10/10 pain to the right flank. Was not able to hold down either zofran or oxycodone. , 4 of I have the morphine, and IV Zofran at this time to control his symptoms. The stone is 4.5 mm in width is likely moving throughout his ureter at this time. Managed with multiple rounds of morphine, ketorolac, and zofran. His pain is better controlled at this time, he has medications sent to his pharmacy which opens at 11 am. He is okay leaving and picking up his prescriptions then, at this time safe for discharge.     CT RSS 12/21/24  Mid right ureteral calculus with mild hydroureteronephrosis, as discussed above.     Haziness of the central abdominal mesentery with multiple small lymph nodes, nonspecific, may relate to a baseline appearance, may be reactive, can be seen with mesenteric panniculitis or a lymphoproliferative process.     Soft tissue finding of the right abdomen in a retrocecal position with associated calcification, present on the prior study although mildly more prominent at this time, short-term follow-up to document stability is recommended.     Diverticula of the colon are noted, there is no evidence for acute diverticulitis.    Ureteral stone    Hydronephrosis with urinary obstruction due to ureteral calculus    Renal colic on right side         I called the pt this morning.  Please have him come at 1 pm today with KUB.  I may have to add him on for surgery tomorrow.    Randi,   Please add him on for cysto right ureteral stent placement, right ureteroscopy with laser lithotripsy for 1 hour under general anesthesia on 12/24/24 at [facility] please.

## 2024-12-23 NOTE — H&P (VIEW-ONLY)
CC: right renal colic, ER follow up    Gautam Ross is a 49 y.o. man who is here for the evaluation of recurrent kidney stone.  Patient is a 49-year-old male who went tot the ER on 12/21/24 with flank pain. He was discharged 2 hours prior for a diagnosed renal stone. Now has nausea and vomiting as well as increased 10/10 pain to the right flank. Was not able to hold down either zofran or oxycodone. , 4 of I have the morphine, and IV Zofran at this time to control his symptoms. The stone is 4.5 mm in width is likely moving throughout his ureter at this time. Managed with multiple rounds of morphine, ketorolac, and zofran. His pain is better controlled at this time, he has medications sent to his pharmacy which opens at 11 am. He is okay leaving and picking up his prescriptions then, at this time safe for discharge.      CT RSS 12/21/24  Mid right ureteral calculus with mild hydroureteronephrosis, as discussed above.   Haziness of the central abdominal mesentery with multiple small lymph nodes, nonspecific, may relate to a baseline appearance, may be reactive, can be seen with mesenteric panniculitis or a lymphoproliferative process.   Soft tissue finding of the right abdomen in a retrocecal position with associated calcification, present on the prior study although mildly more prominent at this time, short-term follow-up to document stability is recommended.   Diverticula of the colon are noted, there is no evidence for acute diverticulitis.    Since his ER visit, he has done better.  His pain has been under control.  No fever or chills reported.    Has a Hx of a  left renal stone presenting for definitive stone management. Potential UPJ obstruction/narrowing discussed, but patient wanted to proceed with the risks of the stone not passing.   The stone is radio-opaque on KUB.    Date: 08/02/2023  Pre-Op Diagnosis: Left renal stone  Post-Op Diagnosis: same  Procedure(s) Performed:   1.  Left  ESWL  Findings:   -Left lower pole stone completely fragmented and not visible at the end of the case      His family members had many episodes of recurrent kidney stones.  His father and older brother had at least more than 20 stones in the past.  His older brother started to take Calcium supplement and stopped having recurrent stones.    He is a .      Past Medical History:   Diagnosis Date    Acute appendicitis 2018    Gallbladder attack 11/15/2018    Pain in right shoulder 2019    Postprocedural intraabdominal abscess 2018     Past Surgical History:   Procedure Laterality Date    APPENDECTOMY      EXTRACORPOREAL SHOCK WAVE LITHOTRIPSY Right 10/23/2020    Procedure: LITHOTRIPSY, ESWL;  Surgeon: Kirill Graves MD;  Location: [facility] OR [address] [place] [address];  Service: Urology;  Laterality: Right;  1.5 hours     EXTRACORPOREAL SHOCK WAVE LITHOTRIPSY Left 2023    Procedure: LITHOTRIPSY, ESWL;  Surgeon: Kirill Graves MD;  [facility];  Service: Urology;  Laterality: Left;    INTRAVENOUS PYELOGRAM N/A 10/23/2020    Procedure: PYELOGRAM, INTRAVENOUS;  Surgeon: Kirill Graves MD;  [facility];  Service: Urology;  Laterality: N/A;    LAPAROSCOPIC CHOLECYSTECTOMY N/A 11/15/2018    Procedure: CHOLECYSTECTOMY, LAPAROSCOPIC;  Surgeon: Korey Keller Jr., MD;  Location: [facility];  Service: General;  Laterality: N/A;    LASIK Bilateral     UMBILICAL HERNIA REPAIR  10/16/13    open repair without mesh for 1cm defect; Dr. Rosales, [facility] [place]     Social History     Tobacco Use    Smoking status: Former     Current packs/day: 0.00     Types: Cigarettes     Quit date: 2002     Years since quittin.3     Passive exposure: Past    Smokeless tobacco: Never   Substance Use Topics    Alcohol use: No    Drug use: No     Family History   Problem Relation Name Age of Onset    Arthritis Mother      Cancer Father      Diabetes Father      Cataracts Father      Allergies Son          food allergies     Amblyopia Neg Hx      Blindness Neg Hx      Glaucoma Neg Hx      Macular degeneration Neg Hx      Retinal detachment Neg Hx      Strabismus Neg Hx       Allergy:  Review of patient's allergies indicates:  No Known Allergies  Outpatient Encounter Medications as of 12/23/2024   Medication Sig Dispense Refill    clindamycin phosphate 1% (CLINDAGEL) 1 % gel Apply topically 2 (two) times daily. 30 g 2    HYDROcodone-acetaminophen (NORCO) 5-325 mg per tablet Take 1 tablet by mouth every 6 (six) hours as needed for Pain. 12 tablet 0    ketorolac (TORADOL) 10 mg tablet Take 1 tablet (10 mg total) by mouth every 6 (six) hours. for 3 days 12 tablet 0    ondansetron (ZOFRAN-ODT) 4 MG TbDL Take 1 tablet (4 mg total) by mouth every 8 (eight) hours as needed (Nausea). 12 tablet 0    sulfamethoxazole-trimethoprim 800-160mg (BACTRIM DS) 800-160 mg Tab Take 1 tablet by mouth 2 (two) times daily. for 7 days 14 tablet 0    tamsulosin (FLOMAX) 0.4 mg Cap Take 1 capsule (0.4 mg total) by mouth once daily. for 10 days 10 capsule 0    tamsulosin (FLOMAX) 0.4 mg Cap Take 1 capsule (0.4 mg total) by mouth every evening. 30 capsule 11     No facility-administered encounter medications on file as of 12/23/2024.     Review of Systems   ROS  Physical Exam     Vitals:    12/23/24 1248   BP: 126/79   Pulse: 69         Physical Exam  Constitutional:       General: He is not in acute distress.     Appearance: He is well-developed. He is not diaphoretic.   HENT:      Head: Normocephalic and atraumatic.      Right Ear: External ear normal.      Left Ear: External ear normal.      Nose: Nose normal.   Eyes:      Conjunctiva/sclera: Conjunctivae normal.      Pupils: Pupils are equal, round, and reactive to light.   Neck:      Thyroid: No thyromegaly.      Vascular: No JVD.      Trachea: No tracheal deviation.   Cardiovascular:      Rate and Rhythm: Normal rate and regular rhythm.      Heart sounds: Normal heart sounds. No murmur heard.     No friction  rub. No gallop.   Pulmonary:      Effort: Pulmonary effort is normal. No respiratory distress.      Breath sounds: Normal breath sounds. No wheezing.   Chest:      Chest wall: No tenderness.   Abdominal:      General: Bowel sounds are normal. There is no distension.      Palpations: Abdomen is soft. There is no mass.      Tenderness: There is no abdominal tenderness. There is no guarding or rebound.   Genitourinary:     Penis: Normal. No tenderness.    Musculoskeletal:         General: No tenderness or deformity. Normal range of motion.      Cervical back: Normal range of motion and neck supple.   Lymphadenopathy:      Cervical: No cervical adenopathy.   Skin:     General: Skin is warm and dry.   Neurological:      Mental Status: He is alert and oriented to person, place, and time.   Psychiatric:         Behavior: Behavior normal.         Thought Content: Thought content normal.         LABS:  Lab Results   Component Value Date    PSA 0.90 05/21/2024    PSA 0.89 09/08/2022     No results found for this or any previous visit.  Lab Results   Component Value Date    CREATININE 1.1 12/21/2024    CREATININE 1.0 05/21/2024    CREATININE 1.1 10/05/2022     No results found for this or any previous visit.  Urine Culture, Routine   Date Value Ref Range Status   10/15/2020 No growth  Final     Hemoglobin A1C   Date Value Ref Range Status   05/21/2024 5.0 4.0 - 5.6 % Final     Comment:     ADA Screening Guidelines:  5.7-6.4%  Consistent with prediabetes  >or=6.5%  Consistent with diabetes    High levels of fetal hemoglobin interfere with the HbA1C  assay. Heterozygous hemoglobin variants (HbS, HgC, etc)do  not significantly interfere with this assay.   However, presence of multiple variants may affect accuracy.       Urine culture 12/18/24         Abnormal   STAPHYLOCOCCUS AUREUS  Few  Susceptibility pending           Radiology:  CT RSS 12/21/24  Mid right ureteral calculus with mild hydroureteronephrosis, as discussed above.    Haziness of the central abdominal mesentery with multiple small lymph nodes, nonspecific, may relate to a baseline appearance, may be reactive, can be seen with mesenteric panniculitis or a lymphoproliferative process.   Soft tissue finding of the right abdomen in a retrocecal position with associated calcification, present on the prior study although mildly more prominent at this time, short-term follow-up to document stability is recommended.   Diverticula of the colon are noted, there is no evidence for acute diverticulitis.    KUB today 12/23/24  Suggestion of faintly visualized calcification at the L5 level on the right side overlying the iliac wing. This could represent a previously described ureteral stone. No other definite a urinary tract calcifications. No bowel dilatation identified.       US 6/14/23  10 mm nonobstructive left renal calculus new since the abdominal ultrasound of June 24, 2022.  KUB 6//14/23  Postop cholecystectomy.  1 cm size vague calcific density right lateral flank stable.  Otherwise no urinary tract stones confirmed.    KUB 8/28/23  No suspicious calcifications are seen.    CT RSS 10/5/22  1. Left renal lower pole new 3 mm nonobstructing calculus.    Assessment and Plan:  Gautam was seen today for flank pain.    Diagnoses and all orders for this visit:    Acute UTI  -     sulfamethoxazole-trimethoprim 800-160mg (BACTRIM DS) 800-160 mg Tab; Take 1 tablet by mouth 2 (two) times daily. for 7 days  -     CULTURE, URINE    Renal colic on right side  -     ketorolac (TORADOL) 10 mg tablet; Take 1 tablet (10 mg total) by mouth every 6 (six) hours. for 3 days  -     tamsulosin (FLOMAX) 0.4 mg Cap; Take 1 capsule (0.4 mg total) by mouth every evening.    Hydronephrosis with urinary obstruction due to ureteral calculus  -     tamsulosin (FLOMAX) 0.4 mg Cap; Take 1 capsule (0.4 mg total) by mouth every evening.    Recurrent kidney stones  -     Kidney Stone Urine Panel One, 24-hour collection;  Future  -     Kidney Stone Urine Panel One, 24-hour collection        S/p left ESWL.  No stone seen on KUB.  Pt did not save any stone fragment.    His brother took Calcium supplement to prevent his stone disease (Raw Calcium Vitamin Code)  He tried Kidney  (over-the-counter supplement) and it did not help him for recurrent stone.  So will try Raw Calcium supplement and will repeat Litholink.    Drink plenty of water to maintain 2 liters of UO a day.  High amount of citric acid intake recommended.    I reviewed his Litholink study obtained on 10/24/22.  High calciumn 349 ( normal less than 250) and high Na 2888 ( normal 50 to 150) noted.  Normal volume ( greater than 2 liters/ day) and normal citric acid 883 ( normal greater than 450) noted.  Recommend HTCZ 25 mg once a day and will titrate up to 25 mg BID or 50 mg once a day.  Low salt diet recommended to keep his urine NA less than 150 if he can.  Take banana every day in order to avoid potassium loss.    I spent 40 minutes with the patient of which more than half was spent in direct consultation with the patient in regards to our treatment and plan.       Follow-up:  Follow up in about 1 day (around 12/24/2024), or cysto right ureteral stent placement, poss. right URS with stone removal.

## 2024-12-23 NOTE — TELEPHONE ENCOUNTER
Patient is currently on DS Bactrim BID for 7 days. His culture was sensitive to that. Discussed cleaning razor head or switching to disposable option. Refrain from shaving for another month. Pt confirmed understanding on phone.    -AALIYAH ALBERT

## 2024-12-23 NOTE — H&P (VIEW-ONLY)
CC: right renal colic, ER follow up    Gautam Ross is a 49 y.o. man who is here for the evaluation of recurrent kidney stone.  Patient is a 49-year-old male who went tot the ER on 12/21/24 with flank pain. He was discharged 2 hours prior for a diagnosed renal stone. Now has nausea and vomiting as well as increased 10/10 pain to the right flank. Was not able to hold down either zofran or oxycodone. , 4 of I have the morphine, and IV Zofran at this time to control his symptoms. The stone is 4.5 mm in width is likely moving throughout his ureter at this time. Managed with multiple rounds of morphine, ketorolac, and zofran. His pain is better controlled at this time, he has medications sent to his pharmacy which opens at 11 am. He is okay leaving and picking up his prescriptions then, at this time safe for discharge.      CT RSS 12/21/24  Mid right ureteral calculus with mild hydroureteronephrosis, as discussed above.   Haziness of the central abdominal mesentery with multiple small lymph nodes, nonspecific, may relate to a baseline appearance, may be reactive, can be seen with mesenteric panniculitis or a lymphoproliferative process.   Soft tissue finding of the right abdomen in a retrocecal position with associated calcification, present on the prior study although mildly more prominent at this time, short-term follow-up to document stability is recommended.   Diverticula of the colon are noted, there is no evidence for acute diverticulitis.    Since his ER visit, he has done better.  His pain has been under control.  No fever or chills reported.    Has a Hx of a  left renal stone presenting for definitive stone management. Potential UPJ obstruction/narrowing discussed, but patient wanted to proceed with the risks of the stone not passing.   The stone is radio-opaque on KUB.    Date: 08/02/2023  Pre-Op Diagnosis: Left renal stone  Post-Op Diagnosis: same  Procedure(s) Performed:   1.  Left  ESWL  Findings:   -Left lower pole stone completely fragmented and not visible at the end of the case      His family members had many episodes of recurrent kidney stones.  His father and older brother had at least more than 20 stones in the past.  His older brother started to take Calcium supplement and stopped having recurrent stones.    He is a .      Past Medical History:   Diagnosis Date    Acute appendicitis 2018    Gallbladder attack 11/15/2018    Pain in right shoulder 2019    Postprocedural intraabdominal abscess 2018     Past Surgical History:   Procedure Laterality Date    APPENDECTOMY      EXTRACORPOREAL SHOCK WAVE LITHOTRIPSY Right 10/23/2020    Procedure: LITHOTRIPSY, ESWL;  Surgeon: Kirill Graves MD;  Location: [facility] OR [address] [place] [address];  Service: Urology;  Laterality: Right;  1.5 hours     EXTRACORPOREAL SHOCK WAVE LITHOTRIPSY Left 2023    Procedure: LITHOTRIPSY, ESWL;  Surgeon: Kirill Graves MD;  [facility];  Service: Urology;  Laterality: Left;    INTRAVENOUS PYELOGRAM N/A 10/23/2020    Procedure: PYELOGRAM, INTRAVENOUS;  Surgeon: Kirill Graves MD;  [facility];  Service: Urology;  Laterality: N/A;    LAPAROSCOPIC CHOLECYSTECTOMY N/A 11/15/2018    Procedure: CHOLECYSTECTOMY, LAPAROSCOPIC;  Surgeon: Korey Keller Jr., MD;  Location: [facility];  Service: General;  Laterality: N/A;    LASIK Bilateral     UMBILICAL HERNIA REPAIR  10/16/13    open repair without mesh for 1cm defect; Dr. Rosales, [facility] [place]     Social History     Tobacco Use    Smoking status: Former     Current packs/day: 0.00     Types: Cigarettes     Quit date: 2002     Years since quittin.3     Passive exposure: Past    Smokeless tobacco: Never   Substance Use Topics    Alcohol use: No    Drug use: No     Family History   Problem Relation Name Age of Onset    Arthritis Mother      Cancer Father      Diabetes Father      Cataracts Father      Allergies Son          food allergies     Amblyopia Neg Hx      Blindness Neg Hx      Glaucoma Neg Hx      Macular degeneration Neg Hx      Retinal detachment Neg Hx      Strabismus Neg Hx       Allergy:  Review of patient's allergies indicates:  No Known Allergies  Outpatient Encounter Medications as of 12/23/2024   Medication Sig Dispense Refill    clindamycin phosphate 1% (CLINDAGEL) 1 % gel Apply topically 2 (two) times daily. 30 g 2    HYDROcodone-acetaminophen (NORCO) 5-325 mg per tablet Take 1 tablet by mouth every 6 (six) hours as needed for Pain. 12 tablet 0    ketorolac (TORADOL) 10 mg tablet Take 1 tablet (10 mg total) by mouth every 6 (six) hours. for 3 days 12 tablet 0    ondansetron (ZOFRAN-ODT) 4 MG TbDL Take 1 tablet (4 mg total) by mouth every 8 (eight) hours as needed (Nausea). 12 tablet 0    sulfamethoxazole-trimethoprim 800-160mg (BACTRIM DS) 800-160 mg Tab Take 1 tablet by mouth 2 (two) times daily. for 7 days 14 tablet 0    tamsulosin (FLOMAX) 0.4 mg Cap Take 1 capsule (0.4 mg total) by mouth once daily. for 10 days 10 capsule 0    tamsulosin (FLOMAX) 0.4 mg Cap Take 1 capsule (0.4 mg total) by mouth every evening. 30 capsule 11     No facility-administered encounter medications on file as of 12/23/2024.     Review of Systems   ROS  Physical Exam     Vitals:    12/23/24 1248   BP: 126/79   Pulse: 69         Physical Exam  Constitutional:       General: He is not in acute distress.     Appearance: He is well-developed. He is not diaphoretic.   HENT:      Head: Normocephalic and atraumatic.      Right Ear: External ear normal.      Left Ear: External ear normal.      Nose: Nose normal.   Eyes:      Conjunctiva/sclera: Conjunctivae normal.      Pupils: Pupils are equal, round, and reactive to light.   Neck:      Thyroid: No thyromegaly.      Vascular: No JVD.      Trachea: No tracheal deviation.   Cardiovascular:      Rate and Rhythm: Normal rate and regular rhythm.      Heart sounds: Normal heart sounds. No murmur heard.     No friction  rub. No gallop.   Pulmonary:      Effort: Pulmonary effort is normal. No respiratory distress.      Breath sounds: Normal breath sounds. No wheezing.   Chest:      Chest wall: No tenderness.   Abdominal:      General: Bowel sounds are normal. There is no distension.      Palpations: Abdomen is soft. There is no mass.      Tenderness: There is no abdominal tenderness. There is no guarding or rebound.   Genitourinary:     Penis: Normal. No tenderness.    Musculoskeletal:         General: No tenderness or deformity. Normal range of motion.      Cervical back: Normal range of motion and neck supple.   Lymphadenopathy:      Cervical: No cervical adenopathy.   Skin:     General: Skin is warm and dry.   Neurological:      Mental Status: He is alert and oriented to person, place, and time.   Psychiatric:         Behavior: Behavior normal.         Thought Content: Thought content normal.         LABS:  Lab Results   Component Value Date    PSA 0.90 05/21/2024    PSA 0.89 09/08/2022     No results found for this or any previous visit.  Lab Results   Component Value Date    CREATININE 1.1 12/21/2024    CREATININE 1.0 05/21/2024    CREATININE 1.1 10/05/2022     No results found for this or any previous visit.  Urine Culture, Routine   Date Value Ref Range Status   10/15/2020 No growth  Final     Hemoglobin A1C   Date Value Ref Range Status   05/21/2024 5.0 4.0 - 5.6 % Final     Comment:     ADA Screening Guidelines:  5.7-6.4%  Consistent with prediabetes  >or=6.5%  Consistent with diabetes    High levels of fetal hemoglobin interfere with the HbA1C  assay. Heterozygous hemoglobin variants (HbS, HgC, etc)do  not significantly interfere with this assay.   However, presence of multiple variants may affect accuracy.       Urine culture 12/18/24         Abnormal   STAPHYLOCOCCUS AUREUS  Few  Susceptibility pending           Radiology:  CT RSS 12/21/24  Mid right ureteral calculus with mild hydroureteronephrosis, as discussed above.    Haziness of the central abdominal mesentery with multiple small lymph nodes, nonspecific, may relate to a baseline appearance, may be reactive, can be seen with mesenteric panniculitis or a lymphoproliferative process.   Soft tissue finding of the right abdomen in a retrocecal position with associated calcification, present on the prior study although mildly more prominent at this time, short-term follow-up to document stability is recommended.   Diverticula of the colon are noted, there is no evidence for acute diverticulitis.    KUB today 12/23/24  Suggestion of faintly visualized calcification at the L5 level on the right side overlying the iliac wing. This could represent a previously described ureteral stone. No other definite a urinary tract calcifications. No bowel dilatation identified.       US 6/14/23  10 mm nonobstructive left renal calculus new since the abdominal ultrasound of June 24, 2022.  KUB 6//14/23  Postop cholecystectomy.  1 cm size vague calcific density right lateral flank stable.  Otherwise no urinary tract stones confirmed.    KUB 8/28/23  No suspicious calcifications are seen.    CT RSS 10/5/22  1. Left renal lower pole new 3 mm nonobstructing calculus.    Assessment and Plan:  Gautam was seen today for flank pain.    Diagnoses and all orders for this visit:    Acute UTI  -     sulfamethoxazole-trimethoprim 800-160mg (BACTRIM DS) 800-160 mg Tab; Take 1 tablet by mouth 2 (two) times daily. for 7 days  -     CULTURE, URINE    Renal colic on right side  -     ketorolac (TORADOL) 10 mg tablet; Take 1 tablet (10 mg total) by mouth every 6 (six) hours. for 3 days  -     tamsulosin (FLOMAX) 0.4 mg Cap; Take 1 capsule (0.4 mg total) by mouth every evening.    Hydronephrosis with urinary obstruction due to ureteral calculus  -     tamsulosin (FLOMAX) 0.4 mg Cap; Take 1 capsule (0.4 mg total) by mouth every evening.    Recurrent kidney stones  -     Kidney Stone Urine Panel One, 24-hour collection;  Future  -     Kidney Stone Urine Panel One, 24-hour collection        S/p left ESWL.  No stone seen on KUB.  Pt did not save any stone fragment.    His brother took Calcium supplement to prevent his stone disease (Raw Calcium Vitamin Code)  He tried Kidney  (over-the-counter supplement) and it did not help him for recurrent stone.  So will try Raw Calcium supplement and will repeat Litholink.    Drink plenty of water to maintain 2 liters of UO a day.  High amount of citric acid intake recommended.    I reviewed his Litholink study obtained on 10/24/22.  High calciumn 349 ( normal less than 250) and high Na 2888 ( normal 50 to 150) noted.  Normal volume ( greater than 2 liters/ day) and normal citric acid 883 ( normal greater than 450) noted.  Recommend HTCZ 25 mg once a day and will titrate up to 25 mg BID or 50 mg once a day.  Low salt diet recommended to keep his urine NA less than 150 if he can.  Take banana every day in order to avoid potassium loss.    I spent 40 minutes with the patient of which more than half was spent in direct consultation with the patient in regards to our treatment and plan.       Follow-up:  Follow up in about 1 day (around 12/24/2024), or cysto right ureteral stent placement, poss. right URS with stone removal.

## 2024-12-23 NOTE — PATIENT INSTRUCTIONS
Randi Jeffries, my surgery scheduler will schedule your surgery ([phone]).    The risks and benefits of the procedure were discussed with the patient in detail.    The patient was told to stop all blood thinners prior to surgery.  Stop ASA and ASA products ( all NSADIS) 1 wk before  Stop Plavix 5 days before.    Stop Eliquis, Xarelto 2 days before.  Stop Coumadin ( Warfarin) 5 days before.    Antibiotic soap shower a night before surgery

## 2024-12-24 ENCOUNTER — TELEPHONE (OUTPATIENT)
Dept: UROLOGY | Facility: CLINIC | Age: 49
End: 2024-12-24
Payer: COMMERCIAL

## 2024-12-24 ENCOUNTER — ANESTHESIA (OUTPATIENT)
Dept: SURGERY | Facility: HOSPITAL | Age: 49
End: 2024-12-24
Payer: COMMERCIAL

## 2024-12-24 ENCOUNTER — HOSPITAL ENCOUNTER (OUTPATIENT)
Facility: HOSPITAL | Age: 49
Discharge: HOME | End: 2024-12-24
Attending: UROLOGY | Admitting: UROLOGY
Payer: COMMERCIAL

## 2024-12-24 ENCOUNTER — PATIENT MESSAGE (OUTPATIENT)
Dept: UROLOGY | Facility: CLINIC | Age: 49
End: 2024-12-24

## 2024-12-24 VITALS
TEMPERATURE: 98 F | HEART RATE: 76 BPM | HEIGHT: 68 IN | OXYGEN SATURATION: 99 % | DIASTOLIC BLOOD PRESSURE: 64 MMHG | WEIGHT: 198.19 LBS | SYSTOLIC BLOOD PRESSURE: 110 MMHG | BODY MASS INDEX: 30.04 KG/M2 | RESPIRATION RATE: 18 BRPM

## 2024-12-24 DIAGNOSIS — N20.9 UROLITHIASIS: Primary | ICD-10-CM

## 2024-12-24 DIAGNOSIS — N20.0 RECURRENT KIDNEY STONES: Primary | ICD-10-CM

## 2024-12-24 LAB — BACTERIA UR CULT: NO GROWTH

## 2024-12-24 PROCEDURE — 37000008 HC ANESTHESIA 1ST 15 MINUTES: Performed by: UROLOGY

## 2024-12-24 PROCEDURE — 37000009 HC ANESTHESIA EA ADD 15 MINS: Performed by: UROLOGY

## 2024-12-24 PROCEDURE — C1769 GUIDE WIRE: HCPCS | Performed by: UROLOGY

## 2024-12-24 PROCEDURE — 71000016 HC POSTOP RECOV ADDL HR: Performed by: UROLOGY

## 2024-12-24 PROCEDURE — 36000707: Performed by: UROLOGY

## 2024-12-24 PROCEDURE — 52332 CYSTOSCOPY AND TREATMENT: CPT | Mod: RT,,, | Performed by: UROLOGY

## 2024-12-24 PROCEDURE — 25500020 PHARM REV CODE 255: Performed by: UROLOGY

## 2024-12-24 PROCEDURE — 63600175 PHARM REV CODE 636 W HCPCS: Performed by: STUDENT IN AN ORGANIZED HEALTH CARE EDUCATION/TRAINING PROGRAM

## 2024-12-24 PROCEDURE — 71000044 HC DOSC ROUTINE RECOVERY FIRST HOUR: Performed by: UROLOGY

## 2024-12-24 PROCEDURE — C1758 CATHETER, URETERAL: HCPCS | Performed by: UROLOGY

## 2024-12-24 PROCEDURE — 36000706: Performed by: UROLOGY

## 2024-12-24 PROCEDURE — 25000003 PHARM REV CODE 250: Performed by: NURSE ANESTHETIST, CERTIFIED REGISTERED

## 2024-12-24 PROCEDURE — 87086 URINE CULTURE/COLONY COUNT: CPT | Performed by: UROLOGY

## 2024-12-24 PROCEDURE — C2617 STENT, NON-COR, TEM W/O DEL: HCPCS | Performed by: UROLOGY

## 2024-12-24 PROCEDURE — 63600175 PHARM REV CODE 636 W HCPCS: Performed by: NURSE ANESTHETIST, CERTIFIED REGISTERED

## 2024-12-24 PROCEDURE — 71000015 HC POSTOP RECOV 1ST HR: Performed by: UROLOGY

## 2024-12-24 PROCEDURE — 74420 UROGRAPHY RTRGR +-KUB: CPT | Mod: 26,,, | Performed by: UROLOGY

## 2024-12-24 DEVICE — STENT URETERAL UNIV 6FR 26CM: Type: IMPLANTABLE DEVICE | Site: URETER | Status: FUNCTIONAL

## 2024-12-24 RX ORDER — SODIUM CHLORIDE 0.9 % (FLUSH) 0.9 %
10 SYRINGE (ML) INJECTION
Status: DISCONTINUED | OUTPATIENT
Start: 2024-12-24 | End: 2024-12-24 | Stop reason: HOSPADM

## 2024-12-24 RX ORDER — GLUCAGON 1 MG
1 KIT INJECTION
Status: DISCONTINUED | OUTPATIENT
Start: 2024-12-24 | End: 2024-12-24 | Stop reason: HOSPADM

## 2024-12-24 RX ORDER — DEXAMETHASONE SODIUM PHOSPHATE 4 MG/ML
INJECTION, SOLUTION INTRA-ARTICULAR; INTRALESIONAL; INTRAMUSCULAR; INTRAVENOUS; SOFT TISSUE
Status: DISCONTINUED | OUTPATIENT
Start: 2024-12-24 | End: 2024-12-24

## 2024-12-24 RX ORDER — PROPOFOL 10 MG/ML
VIAL (ML) INTRAVENOUS
Status: DISCONTINUED | OUTPATIENT
Start: 2024-12-24 | End: 2024-12-24

## 2024-12-24 RX ORDER — MIDAZOLAM HYDROCHLORIDE 1 MG/ML
INJECTION INTRAMUSCULAR; INTRAVENOUS
Status: DISCONTINUED | OUTPATIENT
Start: 2024-12-24 | End: 2024-12-24

## 2024-12-24 RX ORDER — ONDANSETRON HYDROCHLORIDE 2 MG/ML
INJECTION, SOLUTION INTRAVENOUS
Status: DISCONTINUED | OUTPATIENT
Start: 2024-12-24 | End: 2024-12-24

## 2024-12-24 RX ORDER — FENTANYL CITRATE 50 UG/ML
25 INJECTION, SOLUTION INTRAMUSCULAR; INTRAVENOUS EVERY 5 MIN PRN
Status: DISCONTINUED | OUTPATIENT
Start: 2024-12-24 | End: 2024-12-24 | Stop reason: HOSPADM

## 2024-12-24 RX ORDER — FENTANYL CITRATE 50 UG/ML
INJECTION, SOLUTION INTRAMUSCULAR; INTRAVENOUS
Status: DISCONTINUED | OUTPATIENT
Start: 2024-12-24 | End: 2024-12-24

## 2024-12-24 RX ORDER — FLUCONAZOLE 150 MG/1
150 TABLET ORAL DAILY
Qty: 10 TABLET | Refills: 0 | Status: SHIPPED | OUTPATIENT
Start: 2024-12-24 | End: 2025-01-03

## 2024-12-24 RX ORDER — FLUCONAZOLE 2 MG/ML
400 INJECTION, SOLUTION INTRAVENOUS
Status: COMPLETED | OUTPATIENT
Start: 2024-12-24 | End: 2024-12-24

## 2024-12-24 RX ORDER — LIDOCAINE HYDROCHLORIDE 20 MG/ML
INJECTION INTRAVENOUS
Status: DISCONTINUED | OUTPATIENT
Start: 2024-12-24 | End: 2024-12-24

## 2024-12-24 RX ORDER — HALOPERIDOL 5 MG/ML
0.5 INJECTION INTRAMUSCULAR EVERY 10 MIN PRN
Status: DISCONTINUED | OUTPATIENT
Start: 2024-12-24 | End: 2024-12-24 | Stop reason: HOSPADM

## 2024-12-24 RX ORDER — TAMSULOSIN HYDROCHLORIDE 0.4 MG/1
0.4 CAPSULE ORAL DAILY
Qty: 30 CAPSULE | Refills: 0 | Status: ON HOLD | OUTPATIENT
Start: 2024-12-24 | End: 2025-01-02 | Stop reason: SDUPTHER

## 2024-12-24 RX ORDER — CEFAZOLIN 2 G/1
2 INJECTION, POWDER, FOR SOLUTION INTRAMUSCULAR; INTRAVENOUS
Status: COMPLETED | OUTPATIENT
Start: 2024-12-24 | End: 2024-12-24

## 2024-12-24 RX ADMIN — GLYCOPYRROLATE 0.1 MG: 0.2 INJECTION, SOLUTION INTRAMUSCULAR; INTRAVENOUS at 11:12

## 2024-12-24 RX ADMIN — FENTANYL CITRATE 50 MCG: 50 INJECTION, SOLUTION INTRAMUSCULAR; INTRAVENOUS at 11:12

## 2024-12-24 RX ADMIN — MIDAZOLAM HYDROCHLORIDE 2 MG: 2 INJECTION, SOLUTION INTRAMUSCULAR; INTRAVENOUS at 11:12

## 2024-12-24 RX ADMIN — CEFAZOLIN 2 G: 2 INJECTION, POWDER, FOR SOLUTION INTRAMUSCULAR; INTRAVENOUS at 11:12

## 2024-12-24 RX ADMIN — PROPOFOL 150 MCG/KG/MIN: 10 INJECTION, EMULSION INTRAVENOUS at 11:12

## 2024-12-24 RX ADMIN — DEXAMETHASONE SODIUM PHOSPHATE 4 MG: 4 INJECTION, SOLUTION INTRAMUSCULAR; INTRAVENOUS at 11:12

## 2024-12-24 RX ADMIN — SODIUM CHLORIDE: 9 INJECTION, SOLUTION INTRAVENOUS at 11:12

## 2024-12-24 RX ADMIN — ONDANSETRON 4 MG: 2 INJECTION INTRAMUSCULAR; INTRAVENOUS at 11:12

## 2024-12-24 RX ADMIN — PROPOFOL 50 MG: 10 INJECTION, EMULSION INTRAVENOUS at 11:12

## 2024-12-24 RX ADMIN — LIDOCAINE HYDROCHLORIDE 100 MG: 20 INJECTION INTRAVENOUS at 11:12

## 2024-12-24 RX ADMIN — FLUCONAZOLE 400 MG: 400 INJECTION, SOLUTION INTRAVENOUS at 10:12

## 2024-12-24 NOTE — ANESTHESIA POSTPROCEDURE EVALUATION
Anesthesia Post Evaluation    Patient: Gautam Ross    Procedure(s) Performed: Procedure(s) (LRB):  CYSTOSCOPY, WITH URETERAL STENT INSERTION (Right)  PYELOGRAM, RETROGRADE (Right)    Final Anesthesia Type: general      Patient location during evaluation: PACU  Patient participation: Yes- Able to Participate  Level of consciousness: awake and alert  Post-procedure vital signs: reviewed and stable  Pain management: adequate  Airway patency: patent    PONV status at discharge: No PONV  Anesthetic complications: no      Cardiovascular status: blood pressure returned to baseline and hemodynamically stable  Respiratory status: unassisted and spontaneous ventilation  Hydration status: euvolemic  Follow-up not needed.              Vitals Value Taken Time   /64 12/24/24 1203   Temp 36.8 °C (98.2 °F) 12/24/24 1203   Pulse 79 12/24/24 1208   Resp 13 12/24/24 1208   SpO2 90 % 12/24/24 1208   Vitals shown include unfiled device data.      No case tracking events are documented in the log.      Pain/Marily Score: Marily Score: 10 (12/24/2024 12:03 PM)

## 2024-12-24 NOTE — ANESTHESIA PREPROCEDURE EVALUATION
12/24/2024  Gautam Ross is a 49 y.o., male.      Pre-op Assessment    I have reviewed the Patient Summary Reports.     I have reviewed the Nursing Notes. I have reviewed the NPO Status.   I have reviewed the Medications.     Review of Systems  Anesthesia Hx:             Denies Family Hx of Anesthesia complications.   Personal Hx of Anesthesia complications (Pt states he had a pharyngeal abscess followig GETA in the past)                    Hematology/Oncology:  Hematology Normal   Oncology Normal                                   EENT/Dental:  EENT/Dental Normal           Cardiovascular:  Cardiovascular Normal                                              Pulmonary:  Pulmonary Normal                       Renal/:   renal calculi               Hepatic/GI:  Hepatic/GI Normal                    Musculoskeletal:  Musculoskeletal Normal                Neurological:  Neurology Normal                                      Endocrine:  Endocrine Normal            Dermatological:  Skin Normal    Psych:  Psychiatric Normal                    Physical Exam  General: Well nourished, Alert, Cooperative and Oriented    Airway:  Mallampati: IV   Mouth Opening: Normal  TM Distance: Normal  Tongue: Normal    Dental:  Intact    Chest/Lungs:  Clear to auscultation, Normal Respiratory Rate    Heart:  Rate: Normal  Rhythm: Regular Rhythm        Anesthesia Plan  Type of Anesthesia, risks & benefits discussed:    Anesthesia Type: Gen Supraglottic Airway, Gen Natural Airway  Intra-op Monitoring Plan: Standard ASA Monitors  Post Op Pain Control Plan: multimodal analgesia and IV/PO Opioids PRN  Induction:  IV  Airway Plan: , Post-Induction  Informed Consent: Informed consent signed with the Patient and all parties understand the risks and agree with anesthesia plan.  All questions answered.   ASA Score: 2  Day of Surgery  Review of History & Physical: H&P Update referred to the surgeon/provider.    Ready For Surgery From Anesthesia Perspective.     .

## 2024-12-24 NOTE — OP NOTE
[facility] - Surgery ([facility])  Urology Department  Operative Note    Date of Procedure: 12/24/2024     Pre-Operative Diagnosis:   Kidney stone on right side [N20.0]  Patient Active Problem List    Diagnosis Date Noted    Ureteral stone 12/23/2024    Hydronephrosis with urinary obstruction due to ureteral calculus 12/23/2024    Renal colic on right side 12/23/2024    Hypercalciuria 11/08/2022    Diarrhea 06/22/2022    Wears contact lenses 01/18/2022    Kidney stone on left side 10/23/2020    UPJ (ureteropelvic junction) obstruction 10/19/2020       Post-Operative Diagnosis: same    Procedure(s) Performed:    1. Cystoscopy with right ureteral JJ stent placement  2. Fluoroscopy < 1 h   3. Retrograde pyelogram    Staff Surgeon: Kirill Graves MD    Assistant Surgeon: Jamil Aponte MD    Anesthesia: Monitored Local Anesthesia with Sedation    Estimated Blood Loss: min    Drains:  6 Greek x 26 cm right JJ ureteral stent without strings    Specimen(s): Urine for culture    Indications: Gautam Ross is a 49 y.o. male with a right ureteral stone and yeast in urine. After the risks, benefits, and alternatives were discussed and all questions were answered to his satisfaction, he elected to proceed with surgery.    Operative Findings:  right ureteral stent placed in standard fashion.     Procedure in Detail:  The patient was transferred to the operating room. SCDs were applied and working. Time out was performed, danilo-procedural antibiotics were given. Anesthesia was administered. After adequate anesthesia the patient was placed in dorsal lithotomy position and prepped and draped in the usual sterile fashion.     A rigid cystoscope in a 22 Fr sheath was introduced into the bladder per urethra. The entire urethra was visualized and revealed no strictures or masses. Cystoscopy was performed which showed the right and left ureteral orifices in the normal anatomic position. There were no bladder tumors, no trabeculations,  and no stones.    Attention was turned to the patient's right ureteral orifice, and a motion wire was used to cannulate the UO. The wire was advanced up the right ureter to the level of the expected renal pelvis using intermittent fluoroscopy. The wire was exchanged for a ureteral catheter and contrast was instilled confirming wire position and revealing blunted calyces and hydronephrosis. The catheter was exchanged for a glide wire.     A 6 Fr x 26 cm JJ ureteral stent without strings over the wire to the level of the renal pelvis under direct vision as well as flouroscopy. When the stent was in the appropriate position, the wire was removed. There were good coils visualized both proximally and distally using fluoroscopy and direct vision in the bladder. The bladder was then drained.    He tolerated the procedure well and was transferred to the recovery room in stable condition.      Disposition: He will follow up for definitive stone management in one week.     Jamil Aponte MD

## 2024-12-24 NOTE — PROGRESS NOTES
Recurrent kidney stones  -     Case Request Operating Room: CYSTOSCOPY, WITH URETERAL STENT INSERTION     Could you add right ureteroscopy with stone removal as well on 1/2/25 surgery, please?

## 2024-12-24 NOTE — DISCHARGE INSTRUCTIONS
Post Cystoscopy Instructions  Do not strain to have a bowel movement  No strenuous exercise x 7 days  No driving while you are on narcotic pain medications or if your paul  catheter is in place    You can expect:  To pass stone fragments if you had a stone procedure  Have pain when you void from your stent if you have a stent in place  See blood in your urine if you have a stent in place    If you have a catheter, please return to the ER if your catheter stops draining or you are having abdominal pain.    Call the doctor if:  Temperature is greater than 101F  Persistent vomiting and inability to keep food down  Inability to void if you do not have a catheter

## 2024-12-24 NOTE — TRANSFER OF CARE
"Anesthesia Transfer of Care Note    Patient: Gautam Ross    Procedure(s) Performed: Procedure(s) (LRB):  CYSTOSCOPY, WITH URETERAL STENT INSERTION (Right)  PYELOGRAM, RETROGRADE (Right)    Patient location: PACU    Anesthesia Type: general    Transport from OR: Transported from OR on room air with adequate spontaneous ventilation    Post pain: adequate analgesia    Post assessment: no apparent anesthetic complications and tolerated procedure well    Post vital signs: stable    Level of consciousness: awake, oriented and alert    Nausea/Vomiting: no nausea/vomiting    Complications: none    Transfer of care protocol was followed    Last vitals: Visit Vitals  /64 (BP Location: Left arm, Patient Position: Lying)   Pulse 77   Temp 36.8 °C (98.2 °F) (Temporal)   Resp 15   Ht 5' 8" (1.727 m)   Wt 89.9 kg (198 lb 3.1 oz)   SpO2 (!) 94%   BMI 30.14 kg/m²     "

## 2024-12-24 NOTE — BRIEF OP NOTE
[facility] - Surgery (1st Fl)  Brief Operative Note    Surgery Date: 12/24/2024     Surgeons and Role:     * Kirill Graves MD - Primary     * Jamil Aponte MD - Resident - Assisting        Pre-op Diagnosis:  Kidney stone on left side [N20.0]    Post-op Diagnosis:  Post-Op Diagnosis Codes:     * Kidney stone on left side [N20.0]    Procedure(s) (LRB):  CYSTOSCOPY, WITH URETERAL STENT INSERTION (Right)  PYELOGRAM, RETROGRADE (Right)    Anesthesia: General/MAC    Operative Findings:   Turbid urine on stent placement    Estimated Blood Loss: 2 mL         Specimens:   Specimen (24h ago, onward)      None              Discharge Note    OUTCOME: Patient tolerated treatment/procedure well without complication and is now ready for discharge.    DISPOSITION: Home or Self Care    FINAL DIAGNOSIS:  Hydronephrosis with urinary obstruction due to ureteral calculus    FOLLOWUP: In clinic    DISCHARGE INSTRUCTIONS:    Discharge Procedure Orders   No driving until:   Order Comments: No driving while taking opioid pain medications.     Notify your health care provider if you experience any of the following:  temperature >100.4     Notify your health care provider if you experience any of the following:  persistent nausea and vomiting or diarrhea     Notify your health care provider if you experience any of the following:  severe uncontrolled pain     Notify your health care provider if you experience any of the following:  difficulty breathing or increased cough     Notify your health care provider if you experience any of the following:  severe persistent headache     Notify your health care provider if you experience any of the following:  persistent dizziness, light-headedness, or visual disturbances     Notify your health care provider if you experience any of the following:  increased confusion or weakness     Activity as tolerated

## 2024-12-24 NOTE — PLAN OF CARE
Pre-op assessment completed. Patient verbalized understanding of plan of care. Call bell within reach. Family at the bedside. Phone and glasses given to patient's spouse. Clothing stored in pre-op lockers.

## 2024-12-25 ENCOUNTER — NURSE TRIAGE (OUTPATIENT)
Dept: ADMINISTRATIVE | Facility: CLINIC | Age: 49
End: 2024-12-25
Payer: COMMERCIAL

## 2024-12-25 NOTE — TELEPHONE ENCOUNTER
Wife Anais calling on behalf of pt. States that pt had cystoscopy with stent placement on yesterday. States that pt is taking antibiotics and antifungal. Now c/o one episode of vomiting and 2 episodes of diarrhea. States that Zofran was given and pt is feeling much better. Denies pain and fever. Home care advised per protocol. VU. Encounter routed to provider.     Reason for Disposition   [1] Kidney stone diagnosed by doctor (or NP/PA) AND [2] normal symptoms (e.g., nausea, pain) AND [3] no complications    Additional Information   Negative: Shock suspected (e.g., cold/pale/clammy skin, too weak to stand, low BP, rapid pulse)   Negative: Sounds like a life-threatening emergency to the triager   Negative: [1] Unable to urinate (or only a few drops) > 4 hours AND [2] bladder feels very full (e.g., palpable bladder or strong urge to urinate)   Negative: [1] SEVERE pain (e.g., excruciating, scale 8-10) AND [2] not improved after pain medicine   Negative: SEVERE vomiting   Negative: Fever > 103 F (39.4 C)   Negative: Severe chills (i.e., feeling extremely cold WITH shaking chills)   Negative: Patient sounds very sick or weak to the triager   Negative: [1] MODERATE pain (e.g., interferes with normal activities) AND [2] constant AND [3] lasting longer than 4 hours AND [4] NOT improved with pain medicine   Negative: Passing blood or large blood clots (i.e., size > a dime)  (Exception: Pink or tea-colored urine, flecks or small strands of blood.)   Negative: Fever > 100.4 F (38.0 C)   Negative: [1] Caller has URGENT question (includes prescribed medication questions) AND [2] triager unable to answer question   Negative: SEVERE burning or pain with passing urine (urination)   Negative: [1] MODERATE pain (e.g., interferes with normal activities) AND [2] pain comes and goes AND [3] present > 24 hours   Negative: Pregnant   Negative: Ureteral stent accidentally came out   Negative: [1] Caller has NON-URGENT question (includes  prescribed medication questions) AND [2] triager unable to answer   Negative: Stone has not passed after 4 weeks   Negative: [1] Pain persists AND [2] stone passed > 3 days ago   Negative: [1] Blood in urine (red or tea-colored) AND [2] lasts > 3 days  (Exception: Has a ureteral stent in place.)   Negative: [1] MILD pain (e.g., does not interfere with normal activities) AND [2] pain comes and goes (cramps) AND [3] present > 7 days    Protocols used: Kidney Stone Follow-up Call-A-[facility]

## 2024-12-26 DIAGNOSIS — N20.0 RECURRENT KIDNEY STONES: Primary | ICD-10-CM

## 2024-12-26 NOTE — PROGRESS NOTES
Recurrent kidney stones  -     Case Request Operating Room: REMOVAL, CALCULUS, URETER, URETEROSCOPIC

## 2024-12-27 ENCOUNTER — ANESTHESIA EVENT (OUTPATIENT)
Dept: SURGERY | Facility: HOSPITAL | Age: 49
End: 2024-12-27
Payer: COMMERCIAL

## 2024-12-27 LAB — BACTERIA UR CULT: NO GROWTH

## 2024-12-27 NOTE — ANESTHESIA PREPROCEDURE EVALUATION
12/27/2024  Gautam Ross is a 49 y.o., male.    Pre-operative evaluation for Procedure(s) (LRB):  CYSTOSCOPY, WITH URETERAL STENT INSERTION (N/A)  REMOVAL, CALCULUS, URETER, URETEROSCOPIC    Gautam Ross is a 49 y.o. male       Prev airway: Direct laryngoscopy; Inserted by: CRNA; Airway Device: Endotracheal Tube; Intubated: Postinduction; Blade: Hernandez #2; Airway Device Size: 8.0; Style: Cuffed; Cuff Inflation: Minimal occlusive pressure; Inflation Amount: 5; Placement Verified By: Auscultation, Capnometry; Grade: Grade I; Complicating Factors: None;          2D Echo: none on record      EKG: none on record        Patient Active Problem List   Diagnosis    UPJ (ureteropelvic junction) obstruction    Kidney stone on left side    Wears contact lenses    Diarrhea    Hypercalciuria    Ureteral stone    Hydronephrosis with urinary obstruction due to ureteral calculus    Renal colic on right side       Review of patient's allergies indicates:  No Known Allergies    Past Surgical History:   Procedure Laterality Date    APPENDECTOMY      CYSTOSCOPY W/ URETERAL STENT PLACEMENT Right 12/24/2024    Procedure: CYSTOSCOPY, WITH URETERAL STENT INSERTION;  Surgeon: Kirill Graves MD;  Location: [facility] OR [address] [place] [address];  Service: Urology;  Laterality: Right;    EXTRACORPOREAL SHOCK WAVE LITHOTRIPSY Right 10/23/2020    Procedure: LITHOTRIPSY, ESWL;  Surgeon: Kirill Graves MD;  Location: [address];  Service: Urology;  Laterality: Right;  1.5 hours     EXTRACORPOREAL SHOCK WAVE LITHOTRIPSY Left 8/2/2023    Procedure: LITHOTRIPSY, ESWL;  Surgeon: Kirill Graves MD;  Location: [facility] OR [address] [place] [address];  Service: Urology;  Laterality: Left;    INTRAVENOUS PYELOGRAM N/A 10/23/2020    Procedure: PYELOGRAM, INTRAVENOUS;  Surgeon: Kirill Graves MD;  Location: [facility] OR [address] [place] [address];  Service: Urology;  Laterality: N/A;     "LAPAROSCOPIC CHOLECYSTECTOMY N/A 11/15/2018    Procedure: CHOLECYSTECTOMY, LAPAROSCOPIC;  Surgeon: Korey Keller Jr., MD;  Location: [facility];  Service: General;  Laterality: N/A;    LASIK Bilateral     RETROGRADE PYELOGRAPHY Right 12/24/2024    Procedure: PYELOGRAM, RETROGRADE;  Surgeon: Kirill Graves MD;  Location: [facility] OR [address] [place] [address];  Service: Urology;  Laterality: Right;    UMBILICAL HERNIA REPAIR  10/16/13    open repair without mesh for 1cm defect; Dr. Rosales, [facility]         Vital Signs:  Temp:  [36.4 °C (97.6 °F)]   Pulse:  [78-90]   Resp:  [18]   BP: (117)/(80)   SpO2:  [96 %]       CBC: No results for input(s): "WBC", "RBC", "HGB", "HCT", "PLT", "MCV", "MCH", "MCHC" in the last 72 hours.    CMP: No results for input(s): "NA", "K", "CL", "CO2", "BUN", "CREATININE", "GLU", "MG", "PHOS", "CALCIUM", "ALBUMIN", "PROT", "ALKPHOS", "ALT", "AST", "BILITOT" in the last 72 hours.    INR  No results for input(s): "PT", "INR", "PROTIME", "APTT" in the last 72 hours.              Pre-op Assessment    I have reviewed the Patient Summary Reports.     I have reviewed the Nursing Notes. I have reviewed the NPO Status.   I have reviewed the Medications.     Review of Systems  Anesthesia Hx:   History of prior surgery of interest to airway management or planning:  Previous anesthesia: MAC, General        Denies Family Hx of Anesthesia complications.   Personal Hx of Anesthesia complications                    Hematology/Oncology:  Hematology Normal   Oncology Normal                                   EENT/Dental:  EENT/Dental Normal           Cardiovascular:      Denies Hypertension.         Denies Orthopnea.     Denies MEDINA.                              Pulmonary:  Pulmonary Normal   Denies COPD.  Denies Asthma.    Denies Recent URI.                 Renal/:  Chronic Renal Disease (UPJ) renal calculi       Kidney Function/Disease             Hepatic/GI:  Hepatic/GI Normal                    Neurological:  Denies TIA.     " Denies Seizures.                                Endocrine:  Endocrine Normal                Physical Exam  General: Well nourished, Cooperative and Alert    Airway:  Mallampati: II / II  Mouth Opening: Normal  TM Distance: Normal  Tongue: Normal  Neck ROM: Normal ROM    Dental:  Intact        Anesthesia Plan  Type of Anesthesia, risks & benefits discussed:    Anesthesia Type: Gen Supraglottic Airway  Intra-op Monitoring Plan: Standard ASA Monitors  Post Op Pain Control Plan: multimodal analgesia  Induction:  IV  Airway Plan: , Post-Induction  Informed Consent: Informed consent signed with the Patient and all parties understand the risks and agree with anesthesia plan.  All questions answered.   ASA Score: 2  Day of Surgery Review of History & Physical: H&P Update referred to the surgeon/provider.    Ready For Surgery From Anesthesia Perspective.     .  Personal Hx of Anesthesia complications (Pt states he had a pharyngeal abscess followig GETA in the past)

## 2024-12-27 NOTE — PRE-PROCEDURE INSTRUCTIONS
PreOp Instructions given:   - Verbal medication information (what to hold and what to take)   - NPO guidelines - reviewed w/pt - pt v/u  - Arrival place directions given; time to be given the day before procedure by the   Surgeon's Office [facility]  - Bathing with antibacterial soap   - Don't wear any jewelry or bring any valuables AM of surgery   - No makeup or moisturizer to face   - No perfume/cologne, powder, lotions or aftershave   Pt. verbalized understanding.   Patient does not know arrival time.  Explained that this information comes from the surgeon's office and if they haven't heard from them by 2 or 3 pm to call the office.  Patient stated an understanding.      Personal Hx of Anesthesia complications (Pt states he had a pharyngeal abscess followig GETA in the past)

## 2024-12-30 NOTE — H&P
[facility]     Adi Thomas Patient Status:  Observation    1943 MRN [id number]   Location [facility] 2NE-A Attending Ford Narayan*   Hosp Day # 0 PCP Kvng Eid MD     Chief Complaint: chest pain    Subjective:    History of Present Illness:     Adi Thomas is a 81 year old male with PMHx HTN/ PAD/ ENDY/ A-fib who presented to the hospital for chest pain. His pain began tonight when he was sitting down. It was a sharp pain across his anterior chest rated 0-6/10 which was worse with deep breaths and movement and decreased when sitting still. He denied any recent heavy lifting. He denied any associated dyspnea, diaphoresis, palpitations, lightheadedness, nausea.    History/Other:    Past Medical History:  Past Medical History:    Cellulitis    Chronic venous insufficiency    Essential hypertension    Gout    Obesity    PAD (peripheral artery disease) (HCC)    Paroxysmal atrial fibrillation (HCC)    Sleep apnea     Past Surgical History:   Past Surgical History:   Procedure Laterality Date    Appendectomy      Cataract Bilateral     Hip replacement surgery Left     Knee replacement surgery Right 2001    Knee replacement surgery Left 2010      Family History:   Family History   Problem Relation Age of Onset    Heart Attack Father     Other (CHF) Father     Other (Emphysema) Father     Stroke Mother     Pacemaker Mother     Cancer Mother     Other (Other) Mother     Heart Attack Brother     Cancer Brother      Social History:    reports that he quit smoking about 46 years ago. He has never used smokeless tobacco. He reports current alcohol use of about 12.0 standard drinks of alcohol per week. He reports that he does not use drugs.     Allergies: Allergies[1]    Medications:  Medications Ordered Prior to Encounter[2]    Review of Systems:   A comprehensive review of systems was completed.    Pertinent positives and negatives noted in the  Surgery H and P  Attending: Kristine  Resident: Yohannes   CC: RLQ pain    HPI: Gautam Ross is a pleasant 43 y.o. man with abdominal pain since Friday night.  It was initially diffuse and vague, but localized to the RLQ/ flank afterwards.  There has been some nausea after, but he has been able to tolerate PO yesterday.  No vomiting.  Did not eat anything this morning, in case he needed a procedure.    No fevers or chills.  Normal BM, last one yesterday.    Otherwise healthy.  Had umbilical hernia repair in 2013, primarily by Dr. Rosales.     History reviewed. No pertinent past medical history.    Past Surgical History:   Procedure Laterality Date    LASIK Bilateral     UMBILICAL HERNIA REPAIR  10/16/13    open repair without mesh for 1cm defect; Dr. Rosales, [facility]       Family History   Problem Relation Age of Onset    Arthritis Mother     Cancer Father     Diabetes Father     Cataracts Father     Allergies Son         food allergies    Amblyopia Neg Hx     Blindness Neg Hx     Glaucoma Neg Hx     Macular degeneration Neg Hx     Retinal detachment Neg Hx     Strabismus Neg Hx        Social History     Social History    Marital status:      Spouse name: N/A    Number of children: N/A    Years of education: N/A     Occupational History     [employer]     Social History Main Topics    Smoking status: Former Smoker     Quit date: 8/24/2002    Smokeless tobacco: Not on file    Alcohol use No    Drug use: No    Sexual activity: Not on file     Other Topics Concern    Not on file     Social History Narrative    No narrative on file       No current facility-administered medications on file prior to encounter.      Current Outpatient Prescriptions on File Prior to Encounter   Medication Sig Dispense Refill    fluticasone (FLONASE) 50 mcg/actuation nasal spray 1 spray by Each Nare route once daily. Each nostril daily      valacyclovir (VALTREX) 1000 MG tablet Take 1 tablet  (1,000 mg total) by mouth 3 (three) times daily. 21 tablet 0       Review of patient's allergies indicates:  No Known Allergies    ROS: Constitutional: no fever or chills, pain controlled   Respiratory: Some cough over last 2 weeks, c/w URI. No dyspnea  Cardiovascular: no chest pain or palpitations   Gastrointestinal: no abdominal pain or vomiting   Genitourinary: no hematuria or dysuria   Hematologic/Lymphatic: no easy bruising or lymphadenopathy   Musculoskeletal: no arthralgias or myalgias   Neurological: no seizures or tremors     Phys:  Vitals:    05/13/18 0653 05/13/18 0727 05/13/18 0942   BP: (!) 141/75 130/67 124/72   BP Location: Right arm  Left arm   Patient Position:   Sitting   Pulse: 80 76 67   Resp: 18 15 16   Temp: 98.3 °F (36.8 °C)  98.3 °F (36.8 °C)   TempSrc: Oral  Oral   SpO2: 98% 97% 97%     Phys:   Gen: NAD   HEENT: NCAT, trachea midline  CV: RRR, no m/r/g   Pulm: Unlabored  Abd: Very ttp RLQ and flank.  Localized tenderness to percussion.  Otherwise nontender and no rebound, guarding.   Extremities: no cyanosis or edema, or clubbing  Skin: Skin color, texture, turgor normal. No rashes or lesions     A/P 43 year old man with acute appendicitis    To OR for [facility], class B  Admit to Dr. Kristine Sun/Flagyl  NPO, IVF  Pain and nausea control    Shaheen Sheffield MD  General Surgery, PGY-4  [phone]      HPI.    Objective:   Physical Exam:    /59   Pulse 54   Temp 98.1 °F (36.7 °C) (Temporal)   Resp 23   Ht 5' 10\" (1.778 m)   Wt 280 lb (127 kg)   SpO2 98%   BMI 40.18 kg/m²   General: No acute distress, Alert  Respiratory: No rhonchi, no wheezes  Cardiovascular: S1, S2. Regular rate and rhythm  Abdomen: Soft, Non-tender, non-distended, positive bowel sounds  Neuro: No new focal deficits  Extremities: No edema      Results:    Labs:      Labs Last 24 Hours:    Recent Labs   Lab 12/29/24 2059   RBC 4.78   HGB 15.0   HCT 42.6   MCV 89.1   MCH 31.4   MCHC 35.2   RDW 14.4   NEPRELIM 5.16   WBC 8.0   .0       Recent Labs   Lab 12/29/24 2059   *   BUN 19   CREATSERUM 0.88   EGFRCR 87   CA 9.3   ALB 4.3      K 3.6      CO2 29.0   ALKPHO 88   AST 23   ALT 18   BILT 0.5   TP 7.0       Lab Results   Component Value Date    INR 2.07 (H) 10/23/2019    INR 1.82 (H) 10/11/2019       Recent Labs   Lab 12/29/24 2059   TROPHS 25       No results for input(s): \"TROP\", \"PBNP\" in the last 168 hours.    No results for input(s): \"PCT\" in the last 168 hours.    Imaging: Imaging data reviewed in Epic.    Assessment & Plan:      #Chest pain  -troponin 25  -EKG showing NSR @66 bpm, RBBB, T wave inversions aVR/ V1  -chest xray showing LLL opacity- atelectasis vs PNA -->pt afebrile with normal WBC and no cough making atelectasis more likely  -nuclear stress Feb 2019 normal  -r/o ACS  -cont to trend troponin until peak  -monitor on telemetry  -nitroglycerin, morphine prn  -check AM lipid panel  -cardiology c/s in ED    #A-fib  -cont home xarelto    #HTN  -cont home amlodipine, atenolol    #gout  -cont home allopurinol      Plan of care discussed with ED physician    Honey Vigil DO    Supplementary Documentation:     The 21st Century Cures Act makes medical notes like these available to patients in the interest of transparency. Please be advised this is a medical document. Medical documents are  intended to carry relevant information, facts as evident, and the clinical opinion of the practitioner. The medical note is intended as peer to peer communication and may appear blunt or direct. It is written in medical language and may contain abbreviations or verbiage that are unfamiliar.                                       [1]   Allergies  Allergen Reactions    Metoprolol UNKNOWN     Bad nightmares per wife.   [2]   No current facility-administered medications on file prior to encounter.     Current Outpatient Medications on File Prior to Encounter   Medication Sig Dispense Refill    rivaroxaban (XARELTO) 20 MG Oral Tab Take 1 tablet (20 mg total) by mouth daily with food. 90 tablet 3    amLODIPine 5 MG Oral Tab Take 1 tablet (5 mg total) by mouth daily. 30 tablet 5    atenolol 25 MG Oral Tab Take 1 tablet (25 mg total) by mouth daily. 15 tablet 11    allopurinol 300 MG Oral Tab Take 1 tablet (300 mg total) by mouth daily.      colchicine 0.6 MG Oral Tab Take 1 tablet (0.6 mg total) by mouth as needed.

## 2024-12-31 ENCOUNTER — TELEPHONE (OUTPATIENT)
Dept: UROLOGY | Facility: CLINIC | Age: 49
End: 2024-12-31
Payer: COMMERCIAL

## 2024-12-31 NOTE — TELEPHONE ENCOUNTER
Called pt to give arrival time for surgery on 1/2/25 w/ Dr Graves. Gave pt arrival time of 12:30 pm w/surgery at 2:30 pm. Pt verbalized understanding.

## 2025-01-01 RX ORDER — SODIUM CHLORIDE 9 MG/ML
INJECTION, SOLUTION INTRAVENOUS CONTINUOUS
Status: CANCELLED | OUTPATIENT
Start: 2025-01-01

## 2025-01-01 RX ORDER — CEFAZOLIN 2 G/1
2 INJECTION, POWDER, FOR SOLUTION INTRAMUSCULAR; INTRAVENOUS
Status: CANCELLED | OUTPATIENT
Start: 2025-01-01

## 2025-01-01 RX ORDER — FLUCONAZOLE 2 MG/ML
400 INJECTION, SOLUTION INTRAVENOUS
Status: CANCELLED | OUTPATIENT
Start: 2025-01-01

## 2025-01-02 ENCOUNTER — HOSPITAL ENCOUNTER (OUTPATIENT)
Facility: HOSPITAL | Age: 50
Discharge: HOME | End: 2025-01-02
Attending: UROLOGY | Admitting: UROLOGY
Payer: COMMERCIAL

## 2025-01-02 ENCOUNTER — ANESTHESIA (OUTPATIENT)
Dept: SURGERY | Facility: HOSPITAL | Age: 50
End: 2025-01-02
Payer: COMMERCIAL

## 2025-01-02 VITALS
HEIGHT: 68 IN | DIASTOLIC BLOOD PRESSURE: 80 MMHG | RESPIRATION RATE: 18 BRPM | WEIGHT: 198.44 LBS | BODY MASS INDEX: 30.07 KG/M2 | HEART RATE: 77 BPM | SYSTOLIC BLOOD PRESSURE: 126 MMHG | OXYGEN SATURATION: 100 % | TEMPERATURE: 98 F

## 2025-01-02 DIAGNOSIS — N13.30 HYDRONEPHROSIS OF RIGHT KIDNEY: ICD-10-CM

## 2025-01-02 DIAGNOSIS — N20.0 NEPHROLITHIASIS: Primary | ICD-10-CM

## 2025-01-02 DIAGNOSIS — N20.1 RIGHT URETERAL STONE: Primary | ICD-10-CM

## 2025-01-02 LAB — SPECIMEN SOURCE: NORMAL

## 2025-01-02 PROCEDURE — 63600175 PHARM REV CODE 636 W HCPCS: Performed by: STUDENT IN AN ORGANIZED HEALTH CARE EDUCATION/TRAINING PROGRAM

## 2025-01-02 PROCEDURE — 25000003 PHARM REV CODE 250

## 2025-01-02 PROCEDURE — D9220A PRA ANESTHESIA: Mod: ANES,,, | Performed by: STUDENT IN AN ORGANIZED HEALTH CARE EDUCATION/TRAINING PROGRAM

## 2025-01-02 PROCEDURE — D9220A PRA ANESTHESIA: Mod: CRNA,,, | Performed by: NURSE ANESTHETIST, CERTIFIED REGISTERED

## 2025-01-02 PROCEDURE — 25000003 PHARM REV CODE 250: Performed by: NURSE ANESTHETIST, CERTIFIED REGISTERED

## 2025-01-02 PROCEDURE — 82365 CALCULUS SPECTROSCOPY: CPT | Performed by: UROLOGY

## 2025-01-02 PROCEDURE — 63600175 PHARM REV CODE 636 W HCPCS: Performed by: NURSE ANESTHETIST, CERTIFIED REGISTERED

## 2025-01-02 PROCEDURE — 52356 CYSTO/URETERO W/LITHOTRIPSY: CPT | Mod: RT,,, | Performed by: UROLOGY

## 2025-01-02 RX ORDER — PHENAZOPYRIDINE HYDROCHLORIDE 100 MG/1
100 TABLET, FILM COATED ORAL 3 TIMES DAILY PRN
Qty: 21 TABLET | Refills: 0 | Status: SHIPPED | OUTPATIENT
Start: 2025-01-02 | End: 2025-01-09

## 2025-01-02 RX ORDER — PHENAZOPYRIDINE HYDROCHLORIDE 100 MG/1
100 TABLET, FILM COATED ORAL ONCE
Status: COMPLETED | OUTPATIENT
Start: 2025-01-02 | End: 2025-01-02

## 2025-01-02 RX ORDER — FENTANYL CITRATE 50 UG/ML
INJECTION, SOLUTION INTRAMUSCULAR; INTRAVENOUS
Status: DISCONTINUED | OUTPATIENT
Start: 2025-01-02 | End: 2025-01-02

## 2025-01-02 RX ORDER — SODIUM CHLORIDE 9 MG/ML
INJECTION, SOLUTION INTRAVENOUS CONTINUOUS
Status: DISCONTINUED | OUTPATIENT
Start: 2025-01-02 | End: 2025-01-02 | Stop reason: HOSPADM

## 2025-01-02 RX ORDER — GLUCAGON 1 MG
1 KIT INJECTION
Status: DISCONTINUED | OUTPATIENT
Start: 2025-01-02 | End: 2025-01-02 | Stop reason: HOSPADM

## 2025-01-02 RX ORDER — FLUCONAZOLE 2 MG/ML
400 INJECTION, SOLUTION INTRAVENOUS
Status: DISCONTINUED | OUTPATIENT
Start: 2025-01-02 | End: 2025-01-02 | Stop reason: HOSPADM

## 2025-01-02 RX ORDER — FLUCONAZOLE 2 MG/ML
INJECTION, SOLUTION INTRAVENOUS
Status: DISCONTINUED | OUTPATIENT
Start: 2025-01-02 | End: 2025-01-02

## 2025-01-02 RX ORDER — TAMSULOSIN HYDROCHLORIDE 0.4 MG/1
0.4 CAPSULE ORAL NIGHTLY
Qty: 30 CAPSULE | Refills: 0 | Status: SHIPPED | OUTPATIENT
Start: 2025-01-02 | End: 2025-02-01

## 2025-01-02 RX ORDER — KETOROLAC TROMETHAMINE 10 MG/1
10 TABLET, FILM COATED ORAL EVERY 6 HOURS PRN
Qty: 12 TABLET | Refills: 0 | Status: SHIPPED | OUTPATIENT
Start: 2025-01-02

## 2025-01-02 RX ORDER — PROPOFOL 10 MG/ML
VIAL (ML) INTRAVENOUS
Status: DISCONTINUED | OUTPATIENT
Start: 2025-01-02 | End: 2025-01-02

## 2025-01-02 RX ORDER — PROCHLORPERAZINE EDISYLATE 5 MG/ML
5 INJECTION INTRAMUSCULAR; INTRAVENOUS EVERY 30 MIN PRN
Status: DISCONTINUED | OUTPATIENT
Start: 2025-01-02 | End: 2025-01-02 | Stop reason: HOSPADM

## 2025-01-02 RX ORDER — OXYBUTYNIN CHLORIDE 5 MG/1
5 TABLET ORAL ONCE
Status: COMPLETED | OUTPATIENT
Start: 2025-01-02 | End: 2025-01-02

## 2025-01-02 RX ORDER — MIDAZOLAM HYDROCHLORIDE 1 MG/ML
INJECTION INTRAMUSCULAR; INTRAVENOUS
Status: DISCONTINUED | OUTPATIENT
Start: 2025-01-02 | End: 2025-01-02

## 2025-01-02 RX ORDER — LIDOCAINE HYDROCHLORIDE 20 MG/ML
INJECTION, SOLUTION EPIDURAL; INFILTRATION; INTRACAUDAL; PERINEURAL
Status: DISCONTINUED | OUTPATIENT
Start: 2025-01-02 | End: 2025-01-02

## 2025-01-02 RX ORDER — SODIUM CHLORIDE 0.9 % (FLUSH) 0.9 %
3 SYRINGE (ML) INJECTION
Status: DISCONTINUED | OUTPATIENT
Start: 2025-01-02 | End: 2025-01-02 | Stop reason: HOSPADM

## 2025-01-02 RX ORDER — ONDANSETRON HYDROCHLORIDE 2 MG/ML
INJECTION, SOLUTION INTRAVENOUS
Status: DISCONTINUED | OUTPATIENT
Start: 2025-01-02 | End: 2025-01-02

## 2025-01-02 RX ORDER — HYDROMORPHONE HYDROCHLORIDE 1 MG/ML
0.2 INJECTION, SOLUTION INTRAMUSCULAR; INTRAVENOUS; SUBCUTANEOUS EVERY 5 MIN PRN
Status: DISCONTINUED | OUTPATIENT
Start: 2025-01-02 | End: 2025-01-02 | Stop reason: HOSPADM

## 2025-01-02 RX ORDER — CEFAZOLIN 2 G/1
2 INJECTION, POWDER, FOR SOLUTION INTRAMUSCULAR; INTRAVENOUS
Status: DISCONTINUED | OUTPATIENT
Start: 2025-01-02 | End: 2025-01-02 | Stop reason: HOSPADM

## 2025-01-02 RX ORDER — OXYBUTYNIN CHLORIDE 5 MG/1
5 TABLET ORAL 3 TIMES DAILY
Qty: 21 TABLET | Refills: 0 | Status: SHIPPED | OUTPATIENT
Start: 2025-01-02 | End: 2025-01-09

## 2025-01-02 RX ADMIN — FLUCONAZOLE IN SODIUM CHLORIDE 400 MG: 2 INJECTION, SOLUTION INTRAVENOUS at 01:01

## 2025-01-02 RX ADMIN — PHENAZOPYRIDINE HYDROCHLORIDE 100 MG: 100 TABLET ORAL at 03:01

## 2025-01-02 RX ADMIN — FENTANYL CITRATE 100 MCG: 50 INJECTION, SOLUTION INTRAMUSCULAR; INTRAVENOUS at 01:01

## 2025-01-02 RX ADMIN — MIDAZOLAM HYDROCHLORIDE 2 MG: 2 INJECTION, SOLUTION INTRAMUSCULAR; INTRAVENOUS at 01:01

## 2025-01-02 RX ADMIN — SODIUM CHLORIDE: 0.9 INJECTION, SOLUTION INTRAVENOUS at 01:01

## 2025-01-02 RX ADMIN — OXYBUTYNIN CHLORIDE 5 MG: 5 TABLET ORAL at 03:01

## 2025-01-02 RX ADMIN — PROPOFOL 200 MG: 10 INJECTION, EMULSION INTRAVENOUS at 01:01

## 2025-01-02 RX ADMIN — LIDOCAINE HYDROCHLORIDE 100 MG: 20 INJECTION, SOLUTION EPIDURAL; INFILTRATION; INTRACAUDAL; PERINEURAL at 01:01

## 2025-01-02 RX ADMIN — ONDANSETRON 4 MG: 2 INJECTION INTRAMUSCULAR; INTRAVENOUS at 02:01

## 2025-01-02 NOTE — PROGRESS NOTES
Right ureteral stone  -     US Kidney; Future; Expected date: 01/02/2025    Hydronephrosis of right kidney  -     US Kidney; Future; Expected date: 01/02/2025         Had right ureteral stent removal today.  Pt was instructed to d/c ureteral stent by its string in 5 days.    Will see him in 3 moths with renal US.

## 2025-01-02 NOTE — DISCHARGE SUMMARY
[facility] (1st Fl)  Discharge Note  Short Stay    Procedure(s) (LRB):  CYSTOSCOPY, WITH URETERAL STENT INSERTION (Right)  REMOVAL, CALCULUS, URETER, URETEROSCOPIC (Right)  REMOVAL-STENT (Right)  URETEROSCOPY, WITH LASER LITHOTRIPSY (Right)      OUTCOME: Patient tolerated treatment/procedure well without complication and is now ready for discharge.    DISPOSITION: Home or Self Care    FINAL DIAGNOSIS:  Ureteral stone    FOLLOWUP: In clinic    DISCHARGE INSTRUCTIONS:    Discharge Procedure Orders   US Kidney   Standing Status: Future Standing Exp. Date: 01/02/26     Order Specific Question Answer Comments   May the Radiologist modify the order per protocol to meet the clinical needs of the patient? Yes    Release to patient Immediate      No dressing needed     Notify your health care provider if you experience any of the following:  temperature >100.4     Notify your health care provider if you experience any of the following:  persistent nausea and vomiting or diarrhea     Notify your health care provider if you experience any of the following:  severe uncontrolled pain     Notify your health care provider if you experience any of the following:  difficulty breathing or increased cough     Notify your health care provider if you experience any of the following:  severe persistent headache     Notify your health care provider if you experience any of the following:  worsening rash     Notify your health care provider if you experience any of the following:  persistent dizziness, light-headedness, or visual disturbances     Activity as tolerated        TIME SPENT ON DISCHARGE: 15 minutes

## 2025-01-02 NOTE — PLAN OF CARE
Patient ambulated onto the unit . Patient appears to be in no immediate distress. Patient prepared for surgery. Perioperative period discussed and all questions addressed. Family at the bedside, call bell within reach, and bed in lowest position.Wife is at the bedside .

## 2025-01-02 NOTE — ANESTHESIA PROCEDURE NOTES
Intubation    Date/Time: 1/2/2025 1:30 PM    Performed by: Lombard, Jeffrey C., CRNA  Authorized by: Rose Rosado MD    Intubation:     Induction:  Intravenous    Mask Ventilation:  Not attempted    Attempts:  1    Attempted By:  CRNA    Difficult Airway Encountered?: No      Complications:  None    Airway Device:  Supraglottic airway/LMA    Airway Device Size:  5.0    Placement Verified By:  Capnometry    Complicating Factors:  None    Findings Post-Intubation:  BS equal bilateral

## 2025-01-02 NOTE — PLAN OF CARE
Pt is AAOX4,VSS. Discharge instructions reviewed and pt verbalizes understanding. All questions answered. IV removed. MD spoke with spouse after procedure. Prescriptions delivered to bedside. Pt has urinated since paul removal. Pt ready for discharge.

## 2025-01-02 NOTE — DISCHARGE INSTRUCTIONS
Postoperative Instructions for Stone Surgery    1. Ureteral Stent:     - You have a ureteral stent in place with strings. You may remove your stent on 1/7/25.     - To remove your stent, it is recommended that you do so in the shower or while urinating. To remove the stent, gently grasp the strings hanging outside of your urethra and slowly and gently pull downward until the stent is completely removed. The ureteral stent is approximately 12 inches in length.    2. Hydration and Fluid Intake:     - It is essential to drink plenty of fluids following the ureteroscopy procedure to promote flushing of the urinary system and prevent dehydration.     - Aim to drink at least 8 to 10 glasses of water or other non-caffeinated, non-alcoholic beverages daily, unless instructed otherwise by your healthcare provider.    3. Pain Management:     - You may experience mild to moderate discomfort or pain after the ureteroscopy procedure. This is usually manageable with over-the-counter pain relievers such as acetaminophen or nonsteroidal anti-inflammatory drugs (NSAIDs).     - You may experience mild bladder and flank discomfort especially when voiding due to your ureteral stent. This may be alleviated with medications that were prescribed to you such as oxybutynin and/or pyridium. You may take these as needed for any urinary discomfort while a stent. Please note this discomfort is temporary and should subside once the stent is removed.     - If prescribed pain medication, take it as instructed, following the prescribed dosage and frequency.    4. Urinary Symptoms:     - It is common to experience some urinary symptoms after the procedure, such as urgency, frequency, burning sensation, or blood in the urine (hematuria). These symptoms should gradually improve within a few days.     - If the symptoms worsen or if you notice significant blood clots or inability to urinate, contact your healthcare provider.    5. Activity and Rest:      - It is advisable to take it easy and get plenty of rest for the first few days following the procedure. Avoid strenuous activities, heavy lifting, or vigorous exercise for 1-2 days.     - Gradually resume normal activities as you feel comfortable, but listen to your body and avoid overexertion.    6. Diet and Nutrition:     - Follow any dietary recommendations provided by your healthcare provider. In general, maintain a balanced diet with adequate fiber intake to prevent constipation.     - Avoid excessive consumption of salt, spicy foods, and caffeinated or carbonated beverages, as these may irritate the urinary system.    7. Follow-up Appointments:     - Attend all scheduled follow-up appointments with your healthcare provider to monitor your recovery and assess the success of the stone removal.     - You will be contacted via phone or the patient portal about any follow up appointments and tests/imaging in about 1-2 weeks.     - Additional imaging or laboratory tests may be ordered to evaluate the effectiveness of the procedure.    8. Signs of Complications:     - Be aware of potential complications and contact your healthcare provider if you experience any of the following: persistent or worsening pain, fever, chills, excessive fatigue, severe bleeding, inability to pass urine, or signs of infection.    9. Medication Compliance:     - If prescribed any medications, such as antibiotics or medications to prevent stone recurrence, take them as instructed by your healthcare provider. Follow the recommended dosage and complete the full course of medication.    If you have any additional questions, call [phone] and ask for your provider. If after hours (night or weekends) ask for urology on call and you will be directed to the appropriate provider.

## 2025-01-02 NOTE — OP NOTE
[facility]  Operative Note    Date: 01/02/2025    Pre-Op Diagnosis:  Right ureteral stone    Patient Active Problem List    Diagnosis Date Noted    Ureteral stone 12/23/2024    Hydronephrosis with urinary obstruction due to ureteral calculus 12/23/2024    Renal colic on right side 12/23/2024    Hypercalciuria 11/08/2022    Diarrhea 06/22/2022    Wears contact lenses 01/18/2022    Kidney stone on left side 10/23/2020    UPJ (ureteropelvic junction) obstruction 10/19/2020       Post-Op Diagnosis:  Same    Procedure(s) Performed:   1. Right ureteroscopy with laser lithotripsy and stone basket extraction  2. Cystoscopy  3. Right ureteral stent exchange  4. Fluoro < 1 hr    Specimen(s): right ureteral stone    Staff Surgeon: Kirill Graves MD    Assistant Surgeon: Alexander Lopes MD, Saad Sanchez DO    Anesthesia: General endotracheal anesthesia    Indications: Gautam Ross is a 49 y.o. male with a right ureteral stone presenting for definitive stone management. He is pre-stented.    Findings:  1. Stone not visible on  film.  2. Stone encountered within the right distal ureter, fragmented and extracted to completion.  3. Right ureteral stent placed under fluoroscopy  4. Ureteral stenosis was noted within the right distal ureter, just distal to the ureteral stone, easily negotiated with the scope    Estimated Blood Loss: min    Drains:   1. Right 6 Fr x 26 cm JJ ureteral stent with strings    Procedure in detail:  After risks, benefits, and possible complications were explained, the patient elected to undergo the procedure and informed consent was obtained. All questions were answered in the danilo-operative area. The patient was transferred to the cystoscopy suite and placed in the supine position. SCDs were applied and working. Anesthesia was administered. The patient was then placed in the dorsal lithotomy position and prepped and draped in the usual sterile fashion. Time out was  performed, and danilo-procedural antibiotics were confirmed.     The stone was not visible on  film. A rigid cystoscope in a 22 Fr sheath was introduced into the patient's urethra. This passed easily. The entire urethra was visualized which showed no strictures or masses. Cystoscopy revealed the ureteral orifices in the normal anatomic location bilaterally.    The patient's right ureteral stent was grasped with alligator graspers and brought to the meatus. A motion wire was passed through the stent and into the kidney with fluoroscopic confirmation. The stent was was removed keeping the wire in place.    An 8 Fr rigid ureteroscope was passed into the patient's bladder alongside the wire under direct vision. It was then passed through the right ureteral orifice alongside the wire. A stone was encountered at the level of the right distal ureter.  A 272 micron thulium laser fiber was passed through the ureteroscope. The stone was fragmented/dusted using the laser. The laser fiber was removed and an NCircle basket was introduced through the ureteroscope. Stone fragments were removed and placed in the bladder. The ureteroscope was reinserted and the entire length of the ureter was surveyed and all remaining stone fragments were deemed small enough to pass spontaneously, <1 mm.     The cystoscope was reinserted and the bladder was irrigated to remove the stone fragments. The bladder was drained and the cystoscope removed keeping the wire in place.    A 6 Fr x 26 cm JJ ureteral stent with strings was passed over the wire and up into the renal pelvis using fluoro. When the coil appeared to be in good position in the kidney and the radio-opaque marker of the pusher was at the inferior pubis, the wire was removed under continuous fluoro. Good coils were seen in the kidney and the bladder using fluoro.    The patient tolerated the procedure well and was transferred to the recovery room in stable condition.    Disposition:   The patient will be discharged home from PACU. He follow up with Dr. Graves  in 3 months with a renal ultrasound prior. He was given written instructions to self-remove his ureteral stent with strings on Tuesday, 1/7/2025.    Alexander Lopes MD

## 2025-01-02 NOTE — TRANSFER OF CARE
"Anesthesia Transfer of Care Note    Patient: Gautam Ross    Procedure(s) Performed: Procedure(s) (LRB):  CYSTOSCOPY, WITH URETERAL STENT INSERTION (Right)  REMOVAL, CALCULUS, URETER, URETEROSCOPIC (Right)  REMOVAL-STENT (Right)  URETEROSCOPY, WITH LASER LITHOTRIPSY (Right)    Patient location: PACU    Anesthesia Type: general    Transport from OR: Transported from OR on 6-10 L/min O2 by face mask with adequate spontaneous ventilation    Post pain: adequate analgesia    Post assessment: no apparent anesthetic complications and tolerated procedure well    Post vital signs: stable    Level of consciousness: responds to stimulation and awake    Nausea/Vomiting: no nausea/vomiting    Complications: none    Transfer of care protocol was followed      Last vitals: Visit Vitals  /80 (BP Location: Left arm, Patient Position: Sitting)   Pulse 68   Temp 36.7 °C (98.1 °F) (Temporal)   Resp 18   Ht 5' 8" (1.727 m)   Wt 90 kg (198 lb 6.6 oz)   SpO2 99%   BMI 30.17 kg/m²     "

## 2025-01-06 NOTE — ANESTHESIA POSTPROCEDURE EVALUATION
Anesthesia Post Evaluation    Patient: Gautam Ross    Procedure(s) Performed: Procedure(s) (LRB):  CYSTOSCOPY, WITH URETERAL STENT INSERTION (Right)  REMOVAL, CALCULUS, URETER, URETEROSCOPIC (Right)  REMOVAL-STENT (Right)  URETEROSCOPY, WITH LASER LITHOTRIPSY (Right)    Final Anesthesia Type: general      Patient location during evaluation: PACU  Patient participation: Yes- Able to Participate  Level of consciousness: awake and alert  Post-procedure vital signs: reviewed and stable  Pain management: adequate  Airway patency: patent    PONV status at discharge: No PONV  Anesthetic complications: no      Cardiovascular status: blood pressure returned to baseline  Respiratory status: unassisted, spontaneous ventilation and room air  Hydration status: euvolemic  Follow-up not needed.              Vitals Value Taken Time   /80 01/02/25 1615   Temp 36.7 °C (98.1 °F) 01/02/25 1422   Pulse 77 01/02/25 1615   Resp 18 01/02/25 1615   SpO2 100 % 01/02/25 1615         No case tracking events are documented in the log.      Pain/Marily Score: No data recorded

## 2025-01-07 ENCOUNTER — PATIENT MESSAGE (OUTPATIENT)
Dept: UROLOGY | Facility: CLINIC | Age: 50
End: 2025-01-07
Payer: COMMERCIAL

## 2025-01-14 ENCOUNTER — TELEPHONE (OUTPATIENT)
Dept: ENDOSCOPY | Facility: HOSPITAL | Age: 50
End: 2025-01-14
Payer: COMMERCIAL

## 2025-01-14 NOTE — TELEPHONE ENCOUNTER
Telephoned Anais regarding rescheduling pt's colonoscopy procedure.  Spoke with Anais, however she states they would like to wait until April to schedule.  Direct contact number provided.

## 2025-01-15 ENCOUNTER — PATIENT OUTREACH (OUTPATIENT)
Dept: ADMINISTRATIVE | Facility: HOSPITAL | Age: 50
End: 2025-01-15
Payer: COMMERCIAL

## 2025-01-15 NOTE — PROGRESS NOTES
Health Maintenance reviewed, updated and links triggered. Colorectal screening due ([facility]) 1/15/25   Portal message sent for scheduling.

## 2025-01-16 ENCOUNTER — OFFICE VISIT (OUTPATIENT)
Dept: DERMATOLOGY | Facility: CLINIC | Age: 50
End: 2025-01-16
Payer: COMMERCIAL

## 2025-01-16 DIAGNOSIS — L73.9 FOLLICULITIS: Primary | ICD-10-CM

## 2025-01-16 PROCEDURE — 99214 OFFICE O/P EST MOD 30 MIN: CPT | Mod: S$GLB,,, | Performed by: PHYSICIAN ASSISTANT

## 2025-01-16 PROCEDURE — 1159F MED LIST DOCD IN RCRD: CPT | Mod: CPTII,S$GLB,, | Performed by: PHYSICIAN ASSISTANT

## 2025-01-16 PROCEDURE — 1160F RVW MEDS BY RX/DR IN RCRD: CPT | Mod: CPTII,S$GLB,, | Performed by: PHYSICIAN ASSISTANT

## 2025-01-16 PROCEDURE — G2211 COMPLEX E/M VISIT ADD ON: HCPCS | Mod: S$GLB,,, | Performed by: PHYSICIAN ASSISTANT

## 2025-01-16 PROCEDURE — 99999 PR PBB SHADOW E&M-EST. PATIENT-LVL III: CPT | Mod: PBBFAC,,, | Performed by: PHYSICIAN ASSISTANT

## 2025-01-16 RX ORDER — CLINDAMYCIN AND BENZOYL PEROXIDE 10; 50 MG/G; MG/G
GEL TOPICAL
Qty: 50 G | Refills: 2 | Status: SHIPPED | OUTPATIENT
Start: 2025-01-16

## 2025-01-16 NOTE — PATIENT INSTRUCTIONS
Folliculitis     Use BPO wash 10% or Cln sports wash over the counter daily.    Can prescribe benzaclin for flared areas (use twice daily for 5-7 days).     Change razors frequently.     Return as needed.

## 2025-01-16 NOTE — PROGRESS NOTES
Subjective:      Patient ID:  Gautam Ross is a 49 y.o. male who presents for   Chief Complaint   Patient presents with    Folliculitis     F/u     Pt is present for follow up on folliculitis to arm -which has improved. Finished course of Bactrim that he was prescribed for kidney stone (also sensitive for his staph infection). Feels like lesions are resolving.           Review of Systems   Skin:  Negative for itching and rash.       Objective:   Physical Exam   Constitutional: He appears well-developed and well-nourished. No distress.   Neurological: He is alert and oriented to person, place, and time. He is not disoriented.   Psychiatric: He has a normal mood and affect.   Skin:   Areas Examined (abnormalities noted in diagram):   RUE Inspected                  Diagram Legend     Erythematous scaling macule/papule c/w actinic keratosis       Vascular papule c/w angioma      Pigmented verrucoid papule/plaque c/w seborrheic keratosis      Yellow umbilicated papule c/w sebaceous hyperplasia      Irregularly shaped tan macule c/w lentigo     1-2 mm smooth white papules consistent with Milia      Movable subcutaneous cyst with punctum c/w epidermal inclusion cyst      Subcutaneous movable cyst c/w pilar cyst      Firm pink to brown papule c/w dermatofibroma      Pedunculated fleshy papule(s) c/w skin tag(s)      Evenly pigmented macule c/w junctional nevus     Mildly variegated pigmented, slightly irregular-bordered macule c/w mildly atypical nevus      Flesh colored to evenly pigmented papule c/w intradermal nevus       Pink pearly papule/plaque c/w basal cell carcinoma      Erythematous hyperkeratotic cursted plaque c/w SCC      Surgical scar with no sign of skin cancer recurrence      Open and closed comedones      Inflammatory papules and pustules      Verrucoid papule consistent consistent with wart     Erythematous eczematous patches and plaques     Dystrophic onycholytic nail with subungual debris c/w  onychomycosis     Umbilicated papule    Erythematous-base heme-crusted tan verrucoid plaque consistent with inflamed seborrheic keratosis     Erythematous Silvery Scaling Plaque c/w Psoriasis     See annotation      Assessment / Plan:        Folliculitis  -     clindamycin-benzoyl peroxide (BENZACLIN) gel; Apply topically to affected areas twice daily for 5-7 days when flared.  Dispense: 50 g; Refill: 2    Use BPO wash 10% or Cln sports wash over the counter daily.    Can prescribe benzaclin for flared areas (use twice daily for 5-7 days).     Change razors frequently.          Follow up if symptoms worsen or fail to improve.

## 2025-01-22 ENCOUNTER — OFFICE VISIT (OUTPATIENT)
Dept: FAMILY MEDICINE | Facility: CLINIC | Age: 50
End: 2025-01-22
Attending: FAMILY MEDICINE
Payer: COMMERCIAL

## 2025-01-22 VITALS — HEIGHT: 68 IN | BODY MASS INDEX: 30.07 KG/M2 | WEIGHT: 198.44 LBS

## 2025-01-22 DIAGNOSIS — R93.5 ABNORMAL ABDOMINAL CT SCAN: Primary | ICD-10-CM

## 2025-01-22 DIAGNOSIS — N20.1 RIGHT URETERAL STONE: ICD-10-CM

## 2025-01-22 DIAGNOSIS — N22 CALCULUS OF URINARY TRACT IN DISEASES CLASSIFIED ELSEWHERE: ICD-10-CM

## 2025-01-22 PROCEDURE — 98005 SYNCH AUDIO-VIDEO EST LOW 20: CPT | Mod: 95,,, | Performed by: FAMILY MEDICINE

## 2025-01-22 PROCEDURE — 1159F MED LIST DOCD IN RCRD: CPT | Mod: CPTII,95,, | Performed by: FAMILY MEDICINE

## 2025-01-22 PROCEDURE — 3008F BODY MASS INDEX DOCD: CPT | Mod: CPTII,95,, | Performed by: FAMILY MEDICINE

## 2025-01-22 PROCEDURE — 1160F RVW MEDS BY RX/DR IN RCRD: CPT | Mod: CPTII,95,, | Performed by: FAMILY MEDICINE

## 2025-01-22 NOTE — PROGRESS NOTES
The patient location is: home  The chief complaint leading to consultation is: results    Visit type: audiovisual    Face to Face time with patient: 15  20 minutes of total time spent on the encounter, which includes face to face time and non-face to face time preparing to see the patient (eg, review of tests), Obtaining and/or reviewing separately obtained history, Documenting clinical information in the electronic or other health record, Independently interpreting results (not separately reported) and communicating results to the patient/family/caregiver, or Care coordination (not separately reported).         Each patient to whom he or she provides medical services by telemedicine is:  (1) informed of the relationship between the physician and patient and the respective role of any other health care provider with respect to management of the patient; and (2) notified that he or she may decline to receive medical services by telemedicine and may withdraw from such care at any time.    Notes:   Subjective:       Patient ID: Gautam Ross is a 49 y.o. male.    Chief Complaint: Results    HPI  Pt is in virtual visit for follow up of abnl ct scan dx with kidney stone with incidental findings noted as well.  Pt denies abd pain no change in bowel habits no brbpr no new urinary complaints  Review of Systems   Constitutional:  Negative for activity change, chills, fatigue, fever and unexpected weight change.   HENT:  Negative for hearing loss, rhinorrhea and trouble swallowing.    Eyes:  Negative for discharge and visual disturbance.   Respiratory:  Negative for cough, chest tightness, shortness of breath and wheezing.    Cardiovascular:  Negative for chest pain and palpitations.   Gastrointestinal:  Negative for abdominal distention, abdominal pain, blood in stool, constipation, diarrhea and vomiting.   Endocrine: Negative for polydipsia and polyuria.   Genitourinary:  Negative for difficulty urinating,  "hematuria and urgency.   Musculoskeletal:  Negative for arthralgias, joint swelling and neck pain.   Neurological:  Negative for weakness and headaches.   Psychiatric/Behavioral:  Negative for confusion and dysphoric mood.        Objective:    Ht 5' 8" (1.727 m)   Wt 90 kg (198 lb 6.6 oz)   BMI 30.17 kg/m²       Physical Exam  Constitutional:       Appearance: Normal appearance. He is not ill-appearing.   HENT:      Head: Normocephalic and atraumatic.   Pulmonary:      Effort: Pulmonary effort is normal. No respiratory distress.   Neurological:      General: No focal deficit present.      Mental Status: He is alert and oriented to person, place, and time.      Cranial Nerves: No cranial nerve deficit.      Coordination: Coordination normal.   Psychiatric:         Mood and Affect: Mood normal.         Behavior: Behavior normal.         Thought Content: Thought content normal.         Judgment: Judgment normal.       Ct scan in 12/2024 mesenteric lymph nodes, retrocecal calcification  Assessment:       1. Abnormal abdominal CT scan    2. Kidney stone on left side    3. Calculus of urinary tract in diseases classified elsewhere        Plan:     Orders renal stone ct 3 months  Cont meds  F/u urology as directed  Rtc 3 months       "This note will not be shared with the patient."     "

## 2025-01-24 ENCOUNTER — TELEPHONE (OUTPATIENT)
Dept: FAMILY MEDICINE | Facility: CLINIC | Age: 50
End: 2025-01-24
Payer: COMMERCIAL

## 2025-01-24 NOTE — TELEPHONE ENCOUNTER
----- Message from Nurse Hernández sent at 1/22/2025  1:39 PM CST -----  Regarding: FW: schedule ct scan    ----- Message -----  From: Nan Esquivel MD  Sent: 1/22/2025  12:10 PM CST  To: [facility] Staff  Subject: schedule ct scan                                 Please help pt schedule renal stone ct scan for end of march pta appt with me

## 2025-04-03 ENCOUNTER — HOSPITAL ENCOUNTER (OUTPATIENT)
Dept: RADIOLOGY | Facility: HOSPITAL | Age: 50
Discharge: HOME | End: 2025-04-03
Attending: FAMILY MEDICINE
Payer: COMMERCIAL

## 2025-04-03 DIAGNOSIS — N20.1 RIGHT URETERAL STONE: ICD-10-CM

## 2025-04-03 DIAGNOSIS — N22 CALCULUS OF URINARY TRACT IN DISEASES CLASSIFIED ELSEWHERE: ICD-10-CM

## 2025-04-03 DIAGNOSIS — R93.5 ABNORMAL ABDOMINAL CT SCAN: ICD-10-CM

## 2025-04-03 PROCEDURE — 74176 CT ABD & PELVIS W/O CONTRAST: CPT | Mod: 26,,, | Performed by: RADIOLOGY

## 2025-04-03 PROCEDURE — 74176 CT ABD & PELVIS W/O CONTRAST: CPT | Mod: TC

## 2025-04-10 ENCOUNTER — OFFICE VISIT (OUTPATIENT)
Dept: UROLOGY | Facility: CLINIC | Age: 50
End: 2025-04-10
Payer: COMMERCIAL

## 2025-04-10 VITALS
BODY MASS INDEX: 30.34 KG/M2 | SYSTOLIC BLOOD PRESSURE: 110 MMHG | DIASTOLIC BLOOD PRESSURE: 70 MMHG | HEIGHT: 68 IN | HEART RATE: 82 BPM | WEIGHT: 200.19 LBS

## 2025-04-10 DIAGNOSIS — N20.0 RECURRENT KIDNEY STONES: Primary | ICD-10-CM

## 2025-04-10 PROCEDURE — 99999 PR PBB SHADOW E&M-EST. PATIENT-LVL III: CPT | Mod: PBBFAC,,, | Performed by: UROLOGY

## 2025-04-10 PROCEDURE — 3074F SYST BP LT 130 MM HG: CPT | Mod: CPTII,S$GLB,, | Performed by: UROLOGY

## 2025-04-10 PROCEDURE — 1159F MED LIST DOCD IN RCRD: CPT | Mod: CPTII,S$GLB,, | Performed by: UROLOGY

## 2025-04-10 PROCEDURE — 99214 OFFICE O/P EST MOD 30 MIN: CPT | Mod: S$GLB,,, | Performed by: UROLOGY

## 2025-04-10 PROCEDURE — 3008F BODY MASS INDEX DOCD: CPT | Mod: CPTII,S$GLB,, | Performed by: UROLOGY

## 2025-04-10 PROCEDURE — 3078F DIAST BP <80 MM HG: CPT | Mod: CPTII,S$GLB,, | Performed by: UROLOGY

## 2025-04-10 NOTE — PROGRESS NOTES
CC: s/p laser lithotripsy    Gautam Ross is a 50 y.o. man who is here for the evaluation of recurrent kidney stone.  Patient is a 49-year-old male who went tot the ER on 12/21/24 with flank pain. He was discharged 2 hours prior for a diagnosed renal stone. Now has nausea and vomiting as well as increased 10/10 pain to the right flank. Was not able to hold down either zofran or oxycodone. , 4 of I have the morphine, and IV Zofran at this time to control his symptoms. The stone is 4.5 mm in width is likely moving throughout his ureter at this time. Managed with multiple rounds of morphine, ketorolac, and zofran. His pain is better controlled at this time, he has medications sent to his pharmacy which opens at 11 am. He is okay leaving and picking up his prescriptions then, at this time safe for discharge.     Interval history (4/10/2025): Patient presents for postop appointment. He is now s/p right URS and laser lithotripsy on 1/2/2025. CTRSS on 4/4/25 demonstrated no residual stone. Patient has been doing well and has no complaints today.      CT RSS 12/21/24  Mid right ureteral calculus with mild hydroureteronephrosis, as discussed above.   Haziness of the central abdominal mesentery with multiple small lymph nodes, nonspecific, may relate to a baseline appearance, may be reactive, can be seen with mesenteric panniculitis or a lymphoproliferative process.   Soft tissue finding of the right abdomen in a retrocecal position with associated calcification, present on the prior study although mildly more prominent at this time, short-term follow-up to document stability is recommended.   Diverticula of the colon are noted, there is no evidence for acute diverticulitis.    Since his ER visit, he has done better.  His pain has been under control.  No fever or chills reported.    Has a Hx of a  left renal stone presenting for definitive stone management. Potential UPJ obstruction/narrowing discussed, but patient  wanted to proceed with the risks of the stone not passing.   The stone is radio-opaque on KUB.    Date: 08/02/2023  Pre-Op Diagnosis: Left renal stone  Post-Op Diagnosis: same  Procedure(s) Performed:   1.  Left ESWL  Findings:   -Left lower pole stone completely fragmented and not visible at the end of the case      His family members had many episodes of recurrent kidney stones.  His father and older brother had at least more than 20 stones in the past.  His older brother started to take Calcium supplement and stopped having recurrent stones.    He is a .      Past Medical History:   Diagnosis Date    Acute appendicitis 5/13/2018    Gallbladder attack 11/15/2018    Pain in right shoulder 9/16/2019    Postprocedural intraabdominal abscess 5/24/2018     Past Surgical History:   Procedure Laterality Date    APPENDECTOMY      CYSTOSCOPY W/ URETERAL STENT PLACEMENT Right 12/24/2024    Procedure: CYSTOSCOPY, WITH URETERAL STENT INSERTION;  Surgeon: Kirill Graves MD;  Location: [address];  Service: Urology;  Laterality: Right;    CYSTOSCOPY W/ URETERAL STENT PLACEMENT Right 1/2/2025    Procedure: CYSTOSCOPY, WITH URETERAL STENT INSERTION;  Surgeon: Kirill Graves MD;  Location: [facility] OR [address] [place] [address];  Service: Urology;  Laterality: Right;    EXTRACORPOREAL SHOCK WAVE LITHOTRIPSY Right 10/23/2020    Procedure: LITHOTRIPSY, ESWL;  Surgeon: Kirill Graves MD;  Location: [address];  Service: Urology;  Laterality: Right;  1.5 hours     EXTRACORPOREAL SHOCK WAVE LITHOTRIPSY Left 8/2/2023    Procedure: LITHOTRIPSY, ESWL;  Surgeon: Kirill Graves MD;  Location: [facility] OR [address] [place] [address];  Service: Urology;  Laterality: Left;    INTRAVENOUS PYELOGRAM N/A 10/23/2020    Procedure: PYELOGRAM, INTRAVENOUS;  Surgeon: Kirill Graves MD;  Location: [facility] OR [address] [place] [address];  Service: Urology;  Laterality: N/A;    LAPAROSCOPIC CHOLECYSTECTOMY N/A 11/15/2018    Procedure: CHOLECYSTECTOMY, LAPAROSCOPIC;  Surgeon: Korey Keller Jr., MD;   Location: [facility];  Service: General;  Laterality: N/A;    LASIK Bilateral     REMOVAL-STENT Right 2025    Procedure: REMOVAL-STENT;  Surgeon: Kirill Graves MD;  Location: [facility] OR [facility];  Service: Urology;  Laterality: Right;    RETROGRADE PYELOGRAPHY Right 2024    Procedure: PYELOGRAM, RETROGRADE;  Surgeon: Kirill Graves MD;  Location: [facility] OR [facility];  Service: Urology;  Laterality: Right;    UMBILICAL HERNIA REPAIR  10/16/13    open repair without mesh for 1cm defect; [place]. [place], [facility] [place]    URETEROSCOPIC REMOVAL OF URETERIC CALCULUS Right 2025    Procedure: REMOVAL, CALCULUS, URETER, URETEROSCOPIC;  Surgeon: Kirill Graves MD;  [facility];  Service: Urology;  Laterality: Right;    URETEROSCOPY, WITH LASER LITHOTRIPSY Right 2025    Procedure: URETEROSCOPY, WITH LASER LITHOTRIPSY;  Surgeon: Kirill Graves MD;  [facility];  Service: Urology;  Laterality: Right;     Social History     Tobacco Use    Smoking status: Former     Current packs/day: 0.00     Types: Cigarettes     Quit date: 2002     Years since quittin.6     Passive exposure: Past    Smokeless tobacco: Never   Substance Use Topics    Alcohol use: No    Drug use: No     Family History   Problem Relation Name Age of Onset    Arthritis Mother      Cancer Father      Diabetes Father      Cataracts Father      Allergies Son          food allergies    Amblyopia Neg Hx      Blindness Neg Hx      Glaucoma Neg Hx      Macular degeneration Neg Hx      Retinal detachment Neg Hx      Strabismus Neg Hx       Allergy:  Review of patient's allergies indicates:  No Known Allergies  Outpatient Encounter Medications as of 4/10/2025   Medication Sig Dispense Refill    clindamycin phosphate 1% (CLINDAGEL) 1 % gel Apply topically 2 (two) times daily. 30 g 2    clindamycin-benzoyl peroxide (BENZACLIN) gel Apply topically to affected areas twice daily for 5-7 days when flared. 50 g 2    HYDROcodone-acetaminophen  (NORCO) 5-325 mg per tablet Take 1 tablet by mouth every 6 (six) hours as needed for Pain. (Patient not taking: Reported on 12/27/2024) 12 tablet 0    ketorolac (TORADOL) 10 mg tablet Take 1 tablet (10 mg total) by mouth every 6 (six) hours as needed for Pain. 12 tablet 0    ondansetron (ZOFRAN-ODT) 4 MG TbDL Take 1 tablet (4 mg total) by mouth every 8 (eight) hours as needed (Nausea). 12 tablet 0    oxybutynin (DITROPAN) 5 MG Tab Take 1 tablet (5 mg total) by mouth 3 (three) times daily. for 7 days 21 tablet 0    tamsulosin (FLOMAX) 0.4 mg Cap Take 1 capsule (0.4 mg total) by mouth every evening. (Patient not taking: Reported on 12/27/2024) 30 capsule 11     No facility-administered encounter medications on file as of 4/10/2025.     Review of Systems   ROS  Physical Exam     There were no vitals filed for this visit.        Physical Exam  Constitutional:       General: He is not in acute distress.     Appearance: He is well-developed. He is not diaphoretic.   HENT:      Head: Normocephalic and atraumatic.      Right Ear: External ear normal.      Left Ear: External ear normal.      Nose: Nose normal.   Eyes:      Conjunctiva/sclera: Conjunctivae normal.      Pupils: Pupils are equal, round, and reactive to light.   Neck:      Thyroid: No thyromegaly.      Vascular: No JVD.      Trachea: No tracheal deviation.   Cardiovascular:      Rate and Rhythm: Normal rate and regular rhythm.      Heart sounds: Normal heart sounds. No murmur heard.     No friction rub. No gallop.   Pulmonary:      Effort: Pulmonary effort is normal. No respiratory distress.      Breath sounds: Normal breath sounds. No wheezing.   Chest:      Chest wall: No tenderness.   Abdominal:      General: Bowel sounds are normal. There is no distension.      Palpations: Abdomen is soft. There is no mass.      Tenderness: There is no abdominal tenderness. There is no guarding or rebound.   Genitourinary:     Penis: Normal. No tenderness.     Musculoskeletal:         General: No tenderness or deformity. Normal range of motion.      Cervical back: Normal range of motion and neck supple.   Lymphadenopathy:      Cervical: No cervical adenopathy.   Skin:     General: Skin is warm and dry.   Neurological:      Mental Status: He is alert and oriented to person, place, and time.   Psychiatric:         Behavior: Behavior normal.         Thought Content: Thought content normal.       LABS:  Lab Results   Component Value Date    PSA 0.90 05/21/2024    PSA 0.89 09/08/2022     No results found for this or any previous visit.  Lab Results   Component Value Date    CREATININE 1.1 12/21/2024    CREATININE 1.0 05/21/2024    CREATININE 1.1 10/05/2022     No results found for this or any previous visit.  Urine Culture, Routine   Date Value Ref Range Status   12/24/2024 No growth  Final     Hemoglobin A1C   Date Value Ref Range Status   05/21/2024 5.0 4.0 - 5.6 % Final     Comment:     ADA Screening Guidelines:  5.7-6.4%  Consistent with prediabetes  >or=6.5%  Consistent with diabetes    High levels of fetal hemoglobin interfere with the HbA1C  assay. Heterozygous hemoglobin variants (HbS, HgC, etc)do  not significantly interfere with this assay.   However, presence of multiple variants may affect accuracy.       Urine culture 12/18/24         Abnormal   STAPHYLOCOCCUS AUREUS  Few  Susceptibility pending           Radiology:  CT 4/3/25  Interval laser lithotripsy for right ureteral calculus with no residual renal or ureteral calculi identified.  No evidence for hydronephrosis.     S/p cholecystectomy.     Colonic diverticulosis without evidence for acute diverticulitis.     Prostate gland appears borderline enlarged.    CT RSS 12/21/24  Mid right ureteral calculus with mild hydroureteronephrosis, as discussed above.   Haziness of the central abdominal mesentery with multiple small lymph nodes, nonspecific, may relate to a baseline appearance, may be reactive, can be seen  with mesenteric panniculitis or a lymphoproliferative process.   Soft tissue finding of the right abdomen in a retrocecal position with associated calcification, present on the prior study although mildly more prominent at this time, short-term follow-up to document stability is recommended.   Diverticula of the colon are noted, there is no evidence for acute diverticulitis.    KUB today 12/23/24  Suggestion of faintly visualized calcification at the L5 level on the right side overlying the iliac wing. This could represent a previously described ureteral stone. No other definite a urinary tract calcifications. No bowel dilatation identified.       US 6/14/23  10 mm nonobstructive left renal calculus new since the abdominal ultrasound of June 24, 2022.  KUB 6//14/23  Postop cholecystectomy.  1 cm size vague calcific density right lateral flank stable.  Otherwise no urinary tract stones confirmed.    KUB 8/28/23  No suspicious calcifications are seen.    CT RSS 10/5/22  1. Left renal lower pole new 3 mm nonobstructing calculus.    Assessment and Plan:    #Nephrolithiasis  S/p right URS and laser lithotripsy.  No residual stone seen on CTRSS.  Stone analysis demonstrating calcium oxalate stone  Continue to take Calcium supplement to prevent his stone disease (Raw Calcium Vitamin Code)  Continue to take Kidney  (over-the-counter supplement)   Drink plenty of water to maintain 2 liters of UO a day.  High amount of citric acid intake recommended.  Will plan for Litholink study; patient has obtained kit just needs to complete    Follow-up:  RTC to discuss results of Litholink

## 2025-07-10 ENCOUNTER — TELEPHONE (OUTPATIENT)
Dept: ENDOSCOPY | Facility: HOSPITAL | Age: 50
End: 2025-07-10
Payer: COMMERCIAL

## 2025-07-10 ENCOUNTER — PATIENT MESSAGE (OUTPATIENT)
Dept: OPTOMETRY | Facility: CLINIC | Age: 50
End: 2025-07-10
Payer: COMMERCIAL

## 2025-07-10 DIAGNOSIS — Z12.11 SCREEN FOR COLON CANCER: Primary | ICD-10-CM

## 2025-07-30 ENCOUNTER — OFFICE VISIT (OUTPATIENT)
Dept: INTERNAL MEDICINE | Facility: CLINIC | Age: 50
End: 2025-07-30
Payer: COMMERCIAL

## 2025-07-30 VITALS
HEART RATE: 83 BPM | SYSTOLIC BLOOD PRESSURE: 112 MMHG | WEIGHT: 196.63 LBS | RESPIRATION RATE: 18 BRPM | OXYGEN SATURATION: 95 % | TEMPERATURE: 98 F | DIASTOLIC BLOOD PRESSURE: 82 MMHG | HEIGHT: 68 IN | BODY MASS INDEX: 29.8 KG/M2

## 2025-07-30 DIAGNOSIS — J02.9 SORETHROAT: Primary | ICD-10-CM

## 2025-07-30 DIAGNOSIS — U07.1 COVID-19: ICD-10-CM

## 2025-07-30 LAB
CTP QC/QA: YES
SARS-COV+SARS-COV-2 AG RESP QL IA.RAPID: POSITIVE

## 2025-07-30 PROCEDURE — 3008F BODY MASS INDEX DOCD: CPT | Mod: CPTII,S$GLB,, | Performed by: INTERNAL MEDICINE

## 2025-07-30 PROCEDURE — 3074F SYST BP LT 130 MM HG: CPT | Mod: CPTII,S$GLB,, | Performed by: INTERNAL MEDICINE

## 2025-07-30 PROCEDURE — 99213 OFFICE O/P EST LOW 20 MIN: CPT | Mod: S$GLB,,, | Performed by: INTERNAL MEDICINE

## 2025-07-30 PROCEDURE — 99999 PR PBB SHADOW E&M-EST. PATIENT-LVL III: CPT | Mod: PBBFAC,,, | Performed by: INTERNAL MEDICINE

## 2025-07-30 PROCEDURE — 87811 SARS-COV-2 COVID19 W/OPTIC: CPT | Mod: QW,S$GLB,, | Performed by: INTERNAL MEDICINE

## 2025-07-30 PROCEDURE — 3079F DIAST BP 80-89 MM HG: CPT | Mod: CPTII,S$GLB,, | Performed by: INTERNAL MEDICINE

## 2025-07-30 PROCEDURE — 1159F MED LIST DOCD IN RCRD: CPT | Mod: CPTII,S$GLB,, | Performed by: INTERNAL MEDICINE

## 2025-07-30 NOTE — PROGRESS NOTES
History of present illness:   [age] year healthy gentleman with no significant underlying medical conditions in today with 2-3 days of respiratory illness.  Most prominent symptom is significant sore throat.  Also having some sinus congestion and a minimal cough.  No fever no chills.  No body aches.  No significant headache.  He states his teenage son has similar symptoms.  No recent travel.    Current medications:   None    Review of systems   Constitutional: Denies fever or chills or body aches.    HEENT:  No earache.  See HPI.    Respiratory:  No shortness of breath, no pleuritic chest pain, no hemoptysis.  Minimal cough.  Cardiovascular: No chest pain palpitations or syncope.    GI:  No nausea no vomiting no abdominal pain and no diarrhea.    Physical examination:   General: Alert appropriately groomed gentleman acute distress.  Vital signs: All noted and reviewed is normal.    Eyes: Sclerae white nonicteric.    HEENT: Normocephalic.  Neck supple no masses no thyromegaly.  Ear canals tympanic membranes normal.  Mouth and pharynx unremarkable.  No cervical adenopathy.  Lungs: Clear to auscultation.    Cardiovascular: Regular rate rhythm.  No significant murmur.    Data:   Rapid antigen COVID testing positive      Impression:   COVID-19 illness mild symptoms      Plan:   Discussed diagnosis.  Discussed availability of antiviral medicine but he is not interested in such.  He is not a high-risk patient.  Discussed other symptomatic treatments.    Discussed appropriate contagion guidelines.  He will advise if without gradual improvement and resolution or if there is any worsening of his symptomatologies.

## 2025-08-08 ENCOUNTER — ANESTHESIA EVENT (OUTPATIENT)
Dept: ENDOSCOPY | Facility: HOSPITAL | Age: 50
End: 2025-08-08
Payer: COMMERCIAL

## 2025-08-15 ENCOUNTER — ANESTHESIA (OUTPATIENT)
Dept: ENDOSCOPY | Facility: HOSPITAL | Age: 50
End: 2025-08-15
Payer: COMMERCIAL

## 2025-08-15 ENCOUNTER — HOSPITAL ENCOUNTER (OUTPATIENT)
Facility: HOSPITAL | Age: 50
Discharge: HOME | End: 2025-08-15
Attending: STUDENT IN AN ORGANIZED HEALTH CARE EDUCATION/TRAINING PROGRAM | Admitting: STUDENT IN AN ORGANIZED HEALTH CARE EDUCATION/TRAINING PROGRAM
Payer: COMMERCIAL

## 2025-08-15 VITALS
WEIGHT: 195 LBS | HEART RATE: 68 BPM | OXYGEN SATURATION: 99 % | DIASTOLIC BLOOD PRESSURE: 79 MMHG | TEMPERATURE: 98 F | HEIGHT: 68 IN | SYSTOLIC BLOOD PRESSURE: 129 MMHG | RESPIRATION RATE: 20 BRPM | BODY MASS INDEX: 29.55 KG/M2

## 2025-08-15 DIAGNOSIS — Z12.11 COLON CANCER SCREENING: Primary | ICD-10-CM

## 2025-08-15 DIAGNOSIS — Z12.11 SCREEN FOR COLON CANCER: ICD-10-CM

## 2025-08-15 PROCEDURE — 45380 COLONOSCOPY AND BIOPSY: CPT | Mod: 33,,, | Performed by: STUDENT IN AN ORGANIZED HEALTH CARE EDUCATION/TRAINING PROGRAM

## 2025-08-15 PROCEDURE — 37000008 HC ANESTHESIA 1ST 15 MINUTES: Performed by: STUDENT IN AN ORGANIZED HEALTH CARE EDUCATION/TRAINING PROGRAM

## 2025-08-15 PROCEDURE — 27201012 HC FORCEPS, HOT/COLD, DISP: Performed by: STUDENT IN AN ORGANIZED HEALTH CARE EDUCATION/TRAINING PROGRAM

## 2025-08-15 PROCEDURE — 25000003 PHARM REV CODE 250: Performed by: STUDENT IN AN ORGANIZED HEALTH CARE EDUCATION/TRAINING PROGRAM

## 2025-08-15 PROCEDURE — 63600175 PHARM REV CODE 636 W HCPCS: Performed by: NURSE ANESTHETIST, CERTIFIED REGISTERED

## 2025-08-15 PROCEDURE — 37000009 HC ANESTHESIA EA ADD 15 MINS: Performed by: STUDENT IN AN ORGANIZED HEALTH CARE EDUCATION/TRAINING PROGRAM

## 2025-08-15 PROCEDURE — 88305 TISSUE EXAM BY PATHOLOGIST: CPT | Mod: 26,,, | Performed by: PATHOLOGY

## 2025-08-15 PROCEDURE — 88305 TISSUE EXAM BY PATHOLOGIST: CPT | Mod: TC | Performed by: STUDENT IN AN ORGANIZED HEALTH CARE EDUCATION/TRAINING PROGRAM

## 2025-08-15 PROCEDURE — 45380 COLONOSCOPY AND BIOPSY: CPT | Mod: 33 | Performed by: STUDENT IN AN ORGANIZED HEALTH CARE EDUCATION/TRAINING PROGRAM

## 2025-08-15 RX ORDER — SODIUM CHLORIDE 9 MG/ML
INJECTION, SOLUTION INTRAVENOUS CONTINUOUS
Status: DISCONTINUED | OUTPATIENT
Start: 2025-08-15 | End: 2025-08-15 | Stop reason: HOSPADM

## 2025-08-15 RX ORDER — PROPOFOL 10 MG/ML
VIAL (ML) INTRAVENOUS
Status: DISCONTINUED | OUTPATIENT
Start: 2025-08-15 | End: 2025-08-15

## 2025-08-15 RX ORDER — LIDOCAINE HYDROCHLORIDE 20 MG/ML
INJECTION INTRAVENOUS
Status: DISCONTINUED | OUTPATIENT
Start: 2025-08-15 | End: 2025-08-15

## 2025-08-15 RX ORDER — PROPOFOL 10 MG/ML
VIAL (ML) INTRAVENOUS CONTINUOUS PRN
Status: DISCONTINUED | OUTPATIENT
Start: 2025-08-15 | End: 2025-08-15

## 2025-08-15 RX ADMIN — PROPOFOL 100 MG: 10 INJECTION, EMULSION INTRAVENOUS at 09:08

## 2025-08-15 RX ADMIN — PROPOFOL 50 MG: 10 INJECTION, EMULSION INTRAVENOUS at 09:08

## 2025-08-15 RX ADMIN — LIDOCAINE HYDROCHLORIDE 60 MG: 20 INJECTION INTRAVENOUS at 09:08

## 2025-08-15 RX ADMIN — PROPOFOL 150 MCG/KG/MIN: 10 INJECTION, EMULSION INTRAVENOUS at 09:08

## 2025-08-15 RX ADMIN — SODIUM CHLORIDE: 0.9 INJECTION, SOLUTION INTRAVENOUS at 09:08

## 2025-08-18 LAB
ESTROGEN SERPL-MCNC: NORMAL PG/ML
INSULIN SERPL-ACNC: NORMAL U[IU]/ML
LAB AP CLINICAL INFORMATION: NORMAL
LAB AP GROSS DESCRIPTION: NORMAL
LAB AP PERFORMING LOCATION(S): NORMAL
LAB AP REPORT FOOTNOTES: NORMAL

## 2025-08-19 ENCOUNTER — RESULTS FOLLOW-UP (OUTPATIENT)
Dept: GASTROENTEROLOGY | Facility: CLINIC | Age: 50
End: 2025-08-19
Payer: COMMERCIAL

## (undated) DEVICE — KIT ANTIFOG

## (undated) DEVICE — SUT VICRYL 0 27 CT-2

## (undated) DEVICE — TROCAR SPACEMAKER BLUNT 12MM

## (undated) DEVICE — IRRIGATOR ENDOSCOPY DISP.

## (undated) DEVICE — TRAY MINOR GEN SURG

## (undated) DEVICE — SYS CLSR DERMABOND PRINEO 22CM

## (undated) DEVICE — SOL CLEARIFY VISUALIZATION LAP

## (undated) DEVICE — EVACUATOR WOUND BULB 100CC

## (undated) DEVICE — SUT MCRYL PLUS 4-0 PS2 27IN

## (undated) DEVICE — SUT 0 VICRYL / UR6 (J603)

## (undated) DEVICE — SOL NS 1000CC

## (undated) DEVICE — SOL 9P NACL IRR PIC IL

## (undated) DEVICE — NDL HYPO REG 25G X 1 1/2

## (undated) DEVICE — SEE MEDLINE ITEM 152622

## (undated) DEVICE — WIRE GD LUB STD 3CM .035 150CM

## (undated) DEVICE — SEE MEDLINE ITEM 156925

## (undated) DEVICE — PENCIL ELECTROSURG HOLST W/BLD

## (undated) DEVICE — BLADE SURG CARBON STEEL SZ11

## (undated) DEVICE — TROCAR ENDOPATH XCEL 11MM 10CM

## (undated) DEVICE — DRAPE ABDOMINAL TIBURON 14X11

## (undated) DEVICE — BAG TISS RETRV MONARCH 10MM

## (undated) DEVICE — CLOSURE SKIN STERI STRIP 1/4X4

## (undated) DEVICE — TUBING HF INSUFFLATION W/ FLTR

## (undated) DEVICE — DRAPE STERI INSTRUMENT 1018

## (undated) DEVICE — CART STAPLE FLEX ETX 3.5MM BLU

## (undated) DEVICE — PACK CYSTOSCOPY III SIRUS

## (undated) DEVICE — CART STAPLE RELD 45MM WHT

## (undated) DEVICE — TROCAR ENDOPATH XCEL 5X100MM

## (undated) DEVICE — CANNULA ENDOPATH XCEL 5X100MM

## (undated) DEVICE — ADHESIVE DERMABOND ADVANCED

## (undated) DEVICE — KIT WING PAD POSITIONING

## (undated) DEVICE — NDL INSUF ULTRA VERESS 120MM

## (undated) DEVICE — TROCAR KII FIOS 5MM X 100MM

## (undated) DEVICE — ELECTRODE REM PLYHSV RETURN 9

## (undated) DEVICE — STAPLER INT LINEAR ARTC 3.5-45

## (undated) DEVICE — UNDERGLOVES BIOGEL PI SIZE 7.5

## (undated) DEVICE — CLOSURE SKIN STERI STRIP 1/2X4

## (undated) DEVICE — ADAPTER HOSE 10FT 8MM

## (undated) DEVICE — TRAY FOLEY 16FR INFECTION CONT

## (undated) DEVICE — TRAY CYSTO BASIN OMC

## (undated) DEVICE — SOL NACL IRR 3000ML

## (undated) DEVICE — SYR B-D DISP CONTROL 10CC100/C

## (undated) DEVICE — GUIDE WIRE MOTION .035 X 150CM

## (undated) DEVICE — SYR 10CC LUER LOCK

## (undated) DEVICE — APPLIER CLIP EPIX UNIV 5X34

## (undated) DEVICE — CATH POLLACK OPEN-END FLEXI-TI

## (undated) DEVICE — DRAIN WND 15FRX3/16X4.7MM TRCR

## (undated) DEVICE — WARMER DRAPE STERILE LF

## (undated) DEVICE — TROCAR ENDOPATH EXCEL